# Patient Record
Sex: FEMALE | Race: WHITE | NOT HISPANIC OR LATINO | Employment: FULL TIME | URBAN - METROPOLITAN AREA
[De-identification: names, ages, dates, MRNs, and addresses within clinical notes are randomized per-mention and may not be internally consistent; named-entity substitution may affect disease eponyms.]

---

## 2017-01-20 ENCOUNTER — TRANSCRIBE ORDERS (OUTPATIENT)
Dept: ADMINISTRATIVE | Facility: HOSPITAL | Age: 57
End: 2017-01-20

## 2017-01-20 ENCOUNTER — APPOINTMENT (OUTPATIENT)
Dept: LAB | Facility: MEDICAL CENTER | Age: 57
End: 2017-01-20
Payer: COMMERCIAL

## 2017-01-20 DIAGNOSIS — R73.01 IMPAIRED FASTING GLUCOSE: ICD-10-CM

## 2017-01-20 DIAGNOSIS — D50.9 IRON DEFICIENCY ANEMIA, UNSPECIFIED: Primary | ICD-10-CM

## 2017-01-20 DIAGNOSIS — E78.5 HYPERLIPIDEMIA, UNSPECIFIED HYPERLIPIDEMIA TYPE: ICD-10-CM

## 2017-01-20 DIAGNOSIS — D50.9 IRON DEFICIENCY ANEMIA, UNSPECIFIED: ICD-10-CM

## 2017-01-20 DIAGNOSIS — I10 ESSENTIAL HYPERTENSION, MALIGNANT: ICD-10-CM

## 2017-01-20 LAB
ALBUMIN SERPL BCP-MCNC: 3.5 G/DL (ref 3.5–5)
ALP SERPL-CCNC: 121 U/L (ref 46–116)
ALT SERPL W P-5'-P-CCNC: 18 U/L (ref 12–78)
ANION GAP SERPL CALCULATED.3IONS-SCNC: 7 MMOL/L (ref 4–13)
AST SERPL W P-5'-P-CCNC: 13 U/L (ref 5–45)
BASOPHILS # BLD AUTO: 0.03 THOUSANDS/ΜL (ref 0–0.1)
BASOPHILS NFR BLD AUTO: 0 % (ref 0–1)
BILIRUB SERPL-MCNC: 0.39 MG/DL (ref 0.2–1)
BUN SERPL-MCNC: 10 MG/DL (ref 5–25)
CALCIUM SERPL-MCNC: 9 MG/DL (ref 8.3–10.1)
CHLORIDE SERPL-SCNC: 105 MMOL/L (ref 100–108)
CHOLEST SERPL-MCNC: 181 MG/DL (ref 50–200)
CO2 SERPL-SCNC: 28 MMOL/L (ref 21–32)
CREAT SERPL-MCNC: 0.85 MG/DL (ref 0.6–1.3)
EOSINOPHIL # BLD AUTO: 0.31 THOUSAND/ΜL (ref 0–0.61)
EOSINOPHIL NFR BLD AUTO: 4 % (ref 0–6)
ERYTHROCYTE [DISTWIDTH] IN BLOOD BY AUTOMATED COUNT: 17.2 % (ref 11.6–15.1)
EST. AVERAGE GLUCOSE BLD GHB EST-MCNC: 123 MG/DL
GFR SERPL CREATININE-BSD FRML MDRD: >60 ML/MIN/1.73SQ M
GLUCOSE SERPL-MCNC: 91 MG/DL (ref 65–140)
HBA1C MFR BLD: 5.9 % (ref 4.2–6.3)
HCT VFR BLD AUTO: 36.7 % (ref 34.8–46.1)
HDLC SERPL-MCNC: 47 MG/DL (ref 40–60)
HGB BLD-MCNC: 11.2 G/DL (ref 11.5–15.4)
IRON SERPL-MCNC: 33 UG/DL (ref 50–170)
LDLC SERPL CALC-MCNC: 109 MG/DL (ref 0–100)
LYMPHOCYTES # BLD AUTO: 2.05 THOUSANDS/ΜL (ref 0.6–4.47)
LYMPHOCYTES NFR BLD AUTO: 28 % (ref 14–44)
MCH RBC QN AUTO: 23.6 PG (ref 26.8–34.3)
MCHC RBC AUTO-ENTMCNC: 30.5 G/DL (ref 31.4–37.4)
MCV RBC AUTO: 77 FL (ref 82–98)
MONOCYTES # BLD AUTO: 0.51 THOUSAND/ΜL (ref 0.17–1.22)
MONOCYTES NFR BLD AUTO: 7 % (ref 4–12)
NEUTROPHILS # BLD AUTO: 4.46 THOUSANDS/ΜL (ref 1.85–7.62)
NEUTS SEG NFR BLD AUTO: 61 % (ref 43–75)
NRBC BLD AUTO-RTO: 0 /100 WBCS
PLATELET # BLD AUTO: 306 THOUSANDS/UL (ref 149–390)
PMV BLD AUTO: 11 FL (ref 8.9–12.7)
POTASSIUM SERPL-SCNC: 4.1 MMOL/L (ref 3.5–5.3)
PROT SERPL-MCNC: 7.8 G/DL (ref 6.4–8.2)
RBC # BLD AUTO: 4.74 MILLION/UL (ref 3.81–5.12)
SODIUM SERPL-SCNC: 140 MMOL/L (ref 136–145)
TRIGL SERPL-MCNC: 126 MG/DL
WBC # BLD AUTO: 7.37 THOUSAND/UL (ref 4.31–10.16)

## 2017-01-20 PROCEDURE — 83036 HEMOGLOBIN GLYCOSYLATED A1C: CPT

## 2017-01-20 PROCEDURE — 36415 COLL VENOUS BLD VENIPUNCTURE: CPT

## 2017-01-20 PROCEDURE — 83540 ASSAY OF IRON: CPT

## 2017-01-20 PROCEDURE — 80053 COMPREHEN METABOLIC PANEL: CPT

## 2017-01-20 PROCEDURE — 85025 COMPLETE CBC W/AUTO DIFF WBC: CPT

## 2017-01-20 PROCEDURE — 80061 LIPID PANEL: CPT

## 2019-11-08 ENCOUNTER — TRANSCRIBE ORDERS (OUTPATIENT)
Dept: ADMINISTRATIVE | Facility: HOSPITAL | Age: 59
End: 2019-11-08

## 2019-11-08 ENCOUNTER — APPOINTMENT (OUTPATIENT)
Dept: LAB | Facility: MEDICAL CENTER | Age: 59
End: 2019-11-08
Payer: COMMERCIAL

## 2019-11-08 DIAGNOSIS — Z98.84 HX OF GASTRIC BYPASS: ICD-10-CM

## 2019-11-08 DIAGNOSIS — R73.01 IMPAIRED FASTING GLUCOSE: Primary | ICD-10-CM

## 2019-11-08 DIAGNOSIS — D50.9 IRON DEFICIENCY ANEMIA, UNSPECIFIED IRON DEFICIENCY ANEMIA TYPE: ICD-10-CM

## 2019-11-08 DIAGNOSIS — I10 ESSENTIAL HYPERTENSION, BENIGN: ICD-10-CM

## 2019-11-08 DIAGNOSIS — R00.2 PALPITATIONS: ICD-10-CM

## 2019-11-08 LAB
25(OH)D3 SERPL-MCNC: 10.8 NG/ML (ref 30–100)
ALBUMIN SERPL BCP-MCNC: 3.7 G/DL (ref 3.5–5)
ALP SERPL-CCNC: 125 U/L (ref 46–116)
ALT SERPL W P-5'-P-CCNC: 18 U/L (ref 12–78)
ANION GAP SERPL CALCULATED.3IONS-SCNC: 5 MMOL/L (ref 4–13)
AST SERPL W P-5'-P-CCNC: 18 U/L (ref 5–45)
BASOPHILS # BLD AUTO: 0.05 THOUSANDS/ΜL (ref 0–0.1)
BASOPHILS NFR BLD AUTO: 1 % (ref 0–1)
BILIRUB SERPL-MCNC: 0.44 MG/DL (ref 0.2–1)
BUN SERPL-MCNC: 13 MG/DL (ref 5–25)
CALCIUM SERPL-MCNC: 9 MG/DL (ref 8.3–10.1)
CHLORIDE SERPL-SCNC: 107 MMOL/L (ref 100–108)
CHOLEST SERPL-MCNC: 191 MG/DL (ref 50–200)
CO2 SERPL-SCNC: 27 MMOL/L (ref 21–32)
CREAT SERPL-MCNC: 0.89 MG/DL (ref 0.6–1.3)
EOSINOPHIL # BLD AUTO: 0.24 THOUSAND/ΜL (ref 0–0.61)
EOSINOPHIL NFR BLD AUTO: 4 % (ref 0–6)
ERYTHROCYTE [DISTWIDTH] IN BLOOD BY AUTOMATED COUNT: 14.9 % (ref 11.6–15.1)
EST. AVERAGE GLUCOSE BLD GHB EST-MCNC: 111 MG/DL
GFR SERPL CREATININE-BSD FRML MDRD: 71 ML/MIN/1.73SQ M
GLUCOSE P FAST SERPL-MCNC: 95 MG/DL (ref 65–99)
HBA1C MFR BLD: 5.5 % (ref 4.2–6.3)
HCT VFR BLD AUTO: 39.1 % (ref 34.8–46.1)
HDLC SERPL-MCNC: 49 MG/DL
HGB BLD-MCNC: 11.8 G/DL (ref 11.5–15.4)
IMM GRANULOCYTES # BLD AUTO: 0.01 THOUSAND/UL (ref 0–0.2)
IMM GRANULOCYTES NFR BLD AUTO: 0 % (ref 0–2)
IRON SERPL-MCNC: 58 UG/DL (ref 50–170)
LDLC SERPL CALC-MCNC: 115 MG/DL (ref 0–100)
LYMPHOCYTES # BLD AUTO: 1.91 THOUSANDS/ΜL (ref 0.6–4.47)
LYMPHOCYTES NFR BLD AUTO: 31 % (ref 14–44)
MCH RBC QN AUTO: 24.9 PG (ref 26.8–34.3)
MCHC RBC AUTO-ENTMCNC: 30.2 G/DL (ref 31.4–37.4)
MCV RBC AUTO: 83 FL (ref 82–98)
MONOCYTES # BLD AUTO: 0.39 THOUSAND/ΜL (ref 0.17–1.22)
MONOCYTES NFR BLD AUTO: 6 % (ref 4–12)
NEUTROPHILS # BLD AUTO: 3.66 THOUSANDS/ΜL (ref 1.85–7.62)
NEUTS SEG NFR BLD AUTO: 58 % (ref 43–75)
NONHDLC SERPL-MCNC: 142 MG/DL
NRBC BLD AUTO-RTO: 0 /100 WBCS
PLATELET # BLD AUTO: 261 THOUSANDS/UL (ref 149–390)
PMV BLD AUTO: 10.8 FL (ref 8.9–12.7)
POTASSIUM SERPL-SCNC: 3.8 MMOL/L (ref 3.5–5.3)
PROT SERPL-MCNC: 7.8 G/DL (ref 6.4–8.2)
RBC # BLD AUTO: 4.74 MILLION/UL (ref 3.81–5.12)
SODIUM SERPL-SCNC: 139 MMOL/L (ref 136–145)
TRIGL SERPL-MCNC: 134 MG/DL
TSH SERPL DL<=0.05 MIU/L-ACNC: 3.23 UIU/ML (ref 0.36–3.74)
WBC # BLD AUTO: 6.26 THOUSAND/UL (ref 4.31–10.16)

## 2019-11-08 PROCEDURE — 80053 COMPREHEN METABOLIC PANEL: CPT | Performed by: FAMILY MEDICINE

## 2019-11-08 PROCEDURE — 85025 COMPLETE CBC W/AUTO DIFF WBC: CPT | Performed by: FAMILY MEDICINE

## 2019-11-08 PROCEDURE — 84443 ASSAY THYROID STIM HORMONE: CPT | Performed by: FAMILY MEDICINE

## 2019-11-08 PROCEDURE — 82306 VITAMIN D 25 HYDROXY: CPT | Performed by: FAMILY MEDICINE

## 2019-11-08 PROCEDURE — 83540 ASSAY OF IRON: CPT | Performed by: FAMILY MEDICINE

## 2019-11-08 PROCEDURE — 83036 HEMOGLOBIN GLYCOSYLATED A1C: CPT | Performed by: FAMILY MEDICINE

## 2019-11-08 PROCEDURE — 36415 COLL VENOUS BLD VENIPUNCTURE: CPT | Performed by: FAMILY MEDICINE

## 2019-11-08 PROCEDURE — 80061 LIPID PANEL: CPT | Performed by: FAMILY MEDICINE

## 2020-08-10 ENCOUNTER — TELEPHONE (OUTPATIENT)
Dept: OBGYN CLINIC | Facility: HOSPITAL | Age: 60
End: 2020-08-10

## 2020-08-10 NOTE — TELEPHONE ENCOUNTER
Patient requested an appointment online for her left hip  I called the patient and left a voice mail to call us back if she still needs an appointment  I also asked what insurance she has and if it needs a referral  Please schedule when she calls back      Thank you

## 2020-09-10 ENCOUNTER — OFFICE VISIT (OUTPATIENT)
Dept: FAMILY MEDICINE CLINIC | Facility: CLINIC | Age: 60
End: 2020-09-10
Payer: COMMERCIAL

## 2020-09-10 VITALS
OXYGEN SATURATION: 98 % | RESPIRATION RATE: 16 BRPM | HEIGHT: 64 IN | WEIGHT: 293 LBS | SYSTOLIC BLOOD PRESSURE: 120 MMHG | TEMPERATURE: 97.1 F | BODY MASS INDEX: 50.02 KG/M2 | DIASTOLIC BLOOD PRESSURE: 70 MMHG | HEART RATE: 72 BPM

## 2020-09-10 DIAGNOSIS — F41.1 GENERALIZED ANXIETY DISORDER: ICD-10-CM

## 2020-09-10 DIAGNOSIS — I10 ESSENTIAL HYPERTENSION: Primary | ICD-10-CM

## 2020-09-10 DIAGNOSIS — S39.012A STRAIN OF LUMBAR REGION, INITIAL ENCOUNTER: ICD-10-CM

## 2020-09-10 DIAGNOSIS — N63.11 BREAST LUMP ON RIGHT SIDE AT 10 O'CLOCK POSITION: ICD-10-CM

## 2020-09-10 DIAGNOSIS — Z12.39 BREAST CANCER SCREENING: ICD-10-CM

## 2020-09-10 DIAGNOSIS — Z12.11 COLON CANCER SCREENING: ICD-10-CM

## 2020-09-10 DIAGNOSIS — Z23 IMMUNIZATION DUE: ICD-10-CM

## 2020-09-10 DIAGNOSIS — M79.7 FIBROMYALGIA, PRIMARY: ICD-10-CM

## 2020-09-10 DIAGNOSIS — E55.9 VITAMIN D DEFICIENCY: ICD-10-CM

## 2020-09-10 PROCEDURE — 99204 OFFICE O/P NEW MOD 45 MIN: CPT | Performed by: FAMILY MEDICINE

## 2020-09-10 PROCEDURE — 90471 IMMUNIZATION ADMIN: CPT | Performed by: FAMILY MEDICINE

## 2020-09-10 PROCEDURE — 90715 TDAP VACCINE 7 YRS/> IM: CPT | Performed by: FAMILY MEDICINE

## 2020-09-10 RX ORDER — LORAZEPAM 0.5 MG/1
TABLET ORAL
COMMUNITY
Start: 2020-08-26 | End: 2021-04-07 | Stop reason: SDUPTHER

## 2020-09-10 RX ORDER — BISOPROLOL FUMARATE AND HYDROCHLOROTHIAZIDE 10; 6.25 MG/1; MG/1
TABLET ORAL
COMMUNITY
Start: 2020-09-07 | End: 2020-09-10 | Stop reason: SDUPTHER

## 2020-09-10 RX ORDER — DULOXETIN HYDROCHLORIDE 60 MG/1
60 CAPSULE, DELAYED RELEASE ORAL DAILY
Qty: 90 CAPSULE | Refills: 2 | Status: SHIPPED | OUTPATIENT
Start: 2020-09-10 | End: 2021-06-28 | Stop reason: SDUPTHER

## 2020-09-10 RX ORDER — DULOXETIN HYDROCHLORIDE 60 MG/1
60 CAPSULE, DELAYED RELEASE ORAL DAILY
COMMUNITY
Start: 2020-08-26 | End: 2020-09-10 | Stop reason: SDUPTHER

## 2020-09-10 RX ORDER — BISOPROLOL FUMARATE AND HYDROCHLOROTHIAZIDE 10; 6.25 MG/1; MG/1
1 TABLET ORAL DAILY
Qty: 90 TABLET | Refills: 2 | Status: SHIPPED | OUTPATIENT
Start: 2020-09-10 | End: 2021-07-18

## 2020-09-10 RX ORDER — GABAPENTIN 300 MG/1
300 CAPSULE ORAL 2 TIMES DAILY
COMMUNITY
End: 2020-10-16 | Stop reason: SDUPTHER

## 2020-09-10 NOTE — PROGRESS NOTES
Assessment/Plan:         Problem List Items Addressed This Visit        Cardiovascular and Mediastinum    Essential hypertension - Primary     wellcontrolled         Relevant Medications    bisoprolol-hydrochlorothiazide (ZIAC) 10-6 25 MG per tablet    Other Relevant Orders    Comprehensive metabolic panel       Musculoskeletal and Integument    Fibromyalgia, primary     On meds           Relevant Medications    DULoxetine (CYMBALTA) 60 mg delayed release capsule       Other    Breast lump on right side at 10 o'clock position     Lump 1 5x1cm smooth right upper will get diagnostic mammo and right breast US         Relevant Orders    Mammo diagnostic bilateral w cad    US breast right complete (abus)    Generalized anxiety disorder     Ok on meds           Relevant Medications    LORazepam (ATIVAN) 0 5 mg tablet    DULoxetine (CYMBALTA) 60 mg delayed release capsule      Other Visit Diagnoses     Vitamin D deficiency        Relevant Orders    Vitamin D 25 hydroxy    Breast cancer screening        Colon cancer screening        Relevant Orders    Cologuard    Immunization due        Relevant Orders    TDAP VACCINE GREATER THAN OR EQUAL TO 6YO IM    Strain of lumbar region, initial encounter        Relevant Orders    Ambulatory referral to Orthopedic Surgery            Subjective:  New  Patient here to establish and discuss medical problems HTN  Fibromyalgia  Generalized anxiety with hx of panic attacks  Vit d deficiency     Patient ID: Bartolo Sullivan is a 61 y o  female  HPI    The following portions of the patient's history were reviewed and updated as appropriate:   She has a past medical history of Anxiety, Fibromyalgia, High blood pressure, Hypertension, and Panic attacks  ,  does not have any pertinent problems on file  ,   has a past surgical history that includes Tubal ligation; Shoulder open rotator cuff repair; Cholecystectomy; and Knee surgery  ,  family history includes Heart attack in her father; Hypertension in her brother and mother  ,   reports that she quit smoking about 3 years ago  She started smoking about 40 years ago  She smoked 0 50 packs per day  She has never used smokeless tobacco  She reports current alcohol use  She reports that she does not use drugs  ,  has No Known Allergies     Current Outpatient Medications   Medication Sig Dispense Refill    bisoprolol-hydrochlorothiazide (ZIAC) 10-6 25 MG per tablet Take 1 tablet by mouth daily 90 tablet 2    DULoxetine (CYMBALTA) 60 mg delayed release capsule Take 1 capsule (60 mg total) by mouth daily 90 capsule 2    gabapentin (NEURONTIN) 300 mg capsule Take 300 mg by mouth 2 (two) times a day      LORazepam (ATIVAN) 0 5 mg tablet 1 TABLET BY MOUTH TWICE A DAY AS NEEDED FOR ANXIETY       No current facility-administered medications for this visit  Review of Systems   Constitutional: Negative for appetite change, chills, fatigue and fever  Respiratory: Negative for cough, chest tightness and shortness of breath  Cardiovascular: Negative for chest pain, palpitations and leg swelling  Gastrointestinal: Negative for abdominal pain, constipation, diarrhea, nausea and vomiting  Genitourinary: Negative for difficulty urinating and frequency  Musculoskeletal: Positive for back pain (chronic for 6 monthsc)  Negative for arthralgias and neck pain  Skin: Negative for rash  Neurological: Negative for dizziness, weakness, light-headedness, numbness and headaches  Hematological: Does not bruise/bleed easily  Psychiatric/Behavioral: Negative for dysphoric mood and sleep disturbance  The patient is not nervous/anxious  Objective:  Vitals:    09/10/20 1052   BP: 120/70   BP Location: Left arm   Patient Position: Sitting   Cuff Size: Large   Pulse: 72   Resp: 16   Temp: (!) 97 1 °F (36 2 °C)   SpO2: 98%   Weight: (!) 144 kg (317 lb)   Height: 5' 4" (1 626 m)     Body mass index is 54 41 kg/m²  BMI Counseling:  Body mass index is 54 41 kg/m²  The BMI is above normal  Nutrition recommendations include 3-5 servings of fruits/vegetables daily, reducing fast food intake, consuming healthier snacks, decreasing soda and/or juice intake and moderation in carbohydrate intake  Exercise recommendations include exercising 3-5 times per week and strength training exercises  Physical Exam  Vitals signs reviewed  Constitutional:       General: She is not in acute distress  Appearance: Normal appearance  She is well-developed  She is obese  Eyes:      Extraocular Movements: Extraocular movements intact  Conjunctiva/sclera: Conjunctivae normal       Pupils: Pupils are equal, round, and reactive to light  Neck:      Musculoskeletal: Normal range of motion and neck supple  Thyroid: No thyromegaly  Vascular: No carotid bruit  Cardiovascular:      Rate and Rhythm: Normal rate and regular rhythm  Heart sounds: Normal heart sounds  No murmur  Pulmonary:      Effort: Pulmonary effort is normal  No respiratory distress  Breath sounds: Normal breath sounds  Chest:      Chest wall: No tenderness  Breasts:         Right: Mass (right upper 10 oclock position) present  No swelling, bleeding, inverted nipple, nipple discharge, skin change or tenderness  Left: Normal  No swelling, bleeding, inverted nipple, mass, nipple discharge or tenderness  Abdominal:      General: Bowel sounds are normal  There is no distension  Palpations: Abdomen is soft  Tenderness: There is no abdominal tenderness  Lymphadenopathy:      Cervical: No cervical adenopathy  Upper Body:      Right upper body: No axillary adenopathy  Skin:     General: Skin is warm and dry  Neurological:      General: No focal deficit present  Mental Status: She is alert and oriented to person, place, and time  Mental status is at baseline  Cranial Nerves: No cranial nerve deficit        Deep Tendon Reflexes: Reflexes normal  Psychiatric:         Mood and Affect: Mood normal          Behavior: Behavior normal          Thought Content:  Thought content normal

## 2020-09-15 ENCOUNTER — HOSPITAL ENCOUNTER (OUTPATIENT)
Dept: ULTRASOUND IMAGING | Facility: CLINIC | Age: 60
Discharge: HOME | End: 2020-09-15
Payer: COMMERCIAL

## 2020-09-15 ENCOUNTER — HOSPITAL ENCOUNTER (OUTPATIENT)
Dept: MAMMOGRAPHY | Facility: CLINIC | Age: 60
Discharge: HOME | End: 2020-09-15
Payer: COMMERCIAL

## 2020-09-15 VITALS — WEIGHT: 293 LBS | HEIGHT: 64 IN | BODY MASS INDEX: 50.02 KG/M2

## 2020-09-15 DIAGNOSIS — N63.11 BREAST LUMP ON RIGHT SIDE AT 10 O'CLOCK POSITION: ICD-10-CM

## 2020-09-15 PROCEDURE — G0279 TOMOSYNTHESIS, MAMMO: HCPCS

## 2020-09-15 PROCEDURE — 76642 ULTRASOUND BREAST LIMITED: CPT

## 2020-09-15 PROCEDURE — 77066 DX MAMMO INCL CAD BI: CPT

## 2020-10-16 ENCOUNTER — CONSULT (OUTPATIENT)
Dept: OBGYN CLINIC | Facility: CLINIC | Age: 60
End: 2020-10-16
Payer: COMMERCIAL

## 2020-10-16 VITALS
HEART RATE: 69 BPM | WEIGHT: 293 LBS | BODY MASS INDEX: 50.02 KG/M2 | TEMPERATURE: 97.5 F | HEIGHT: 64 IN | DIASTOLIC BLOOD PRESSURE: 76 MMHG | SYSTOLIC BLOOD PRESSURE: 171 MMHG

## 2020-10-16 DIAGNOSIS — R52 PAIN: Primary | ICD-10-CM

## 2020-10-16 DIAGNOSIS — S39.012A STRAIN OF LUMBAR REGION, INITIAL ENCOUNTER: ICD-10-CM

## 2020-10-16 DIAGNOSIS — G89.29 CHRONIC LEFT-SIDED LOW BACK PAIN WITH LEFT-SIDED SCIATICA: Primary | ICD-10-CM

## 2020-10-16 DIAGNOSIS — M54.42 CHRONIC LEFT-SIDED LOW BACK PAIN WITH LEFT-SIDED SCIATICA: Primary | ICD-10-CM

## 2020-10-16 PROBLEM — M54.50 CHRONIC BILATERAL LOW BACK PAIN WITHOUT SCIATICA: Status: ACTIVE | Noted: 2020-10-16

## 2020-10-16 PROCEDURE — 99244 OFF/OP CNSLTJ NEW/EST MOD 40: CPT | Performed by: PHYSICAL MEDICINE & REHABILITATION

## 2020-10-16 RX ORDER — LIDOCAINE 50 MG/G
1 PATCH TOPICAL DAILY
Qty: 30 PATCH | Refills: 1 | Status: SHIPPED | OUTPATIENT
Start: 2020-10-16 | End: 2021-01-06

## 2020-10-16 RX ORDER — GABAPENTIN 300 MG/1
300 CAPSULE ORAL 2 TIMES DAILY
Qty: 60 CAPSULE | Refills: 3 | Status: SHIPPED | OUTPATIENT
Start: 2020-10-16 | End: 2020-12-29

## 2020-10-16 RX ORDER — NAPROXEN 500 MG/1
500 TABLET ORAL 2 TIMES DAILY PRN
Qty: 60 TABLET | Refills: 1 | Status: SHIPPED | OUTPATIENT
Start: 2020-10-16 | End: 2020-11-13

## 2020-10-27 ENCOUNTER — EVALUATION (OUTPATIENT)
Dept: PHYSICAL THERAPY | Facility: MEDICAL CENTER | Age: 60
End: 2020-10-27
Payer: COMMERCIAL

## 2020-10-27 DIAGNOSIS — S39.012A STRAIN OF LUMBAR REGION, INITIAL ENCOUNTER: ICD-10-CM

## 2020-10-27 DIAGNOSIS — M54.42 CHRONIC LEFT-SIDED LOW BACK PAIN WITH LEFT-SIDED SCIATICA: ICD-10-CM

## 2020-10-27 DIAGNOSIS — G89.29 CHRONIC LEFT-SIDED LOW BACK PAIN WITH LEFT-SIDED SCIATICA: ICD-10-CM

## 2020-10-27 PROCEDURE — 97110 THERAPEUTIC EXERCISES: CPT | Performed by: PHYSICAL THERAPIST

## 2020-10-27 PROCEDURE — 97162 PT EVAL MOD COMPLEX 30 MIN: CPT | Performed by: PHYSICAL THERAPIST

## 2020-11-03 ENCOUNTER — APPOINTMENT (OUTPATIENT)
Dept: PHYSICAL THERAPY | Facility: MEDICAL CENTER | Age: 60
End: 2020-11-03
Payer: COMMERCIAL

## 2020-11-04 ENCOUNTER — OFFICE VISIT (OUTPATIENT)
Dept: PHYSICAL THERAPY | Facility: MEDICAL CENTER | Age: 60
End: 2020-11-04
Payer: COMMERCIAL

## 2020-11-04 DIAGNOSIS — M54.42 CHRONIC LEFT-SIDED LOW BACK PAIN WITH LEFT-SIDED SCIATICA: ICD-10-CM

## 2020-11-04 DIAGNOSIS — S39.012A STRAIN OF LUMBAR REGION, INITIAL ENCOUNTER: Primary | ICD-10-CM

## 2020-11-04 DIAGNOSIS — G89.29 CHRONIC LEFT-SIDED LOW BACK PAIN WITH LEFT-SIDED SCIATICA: ICD-10-CM

## 2020-11-04 PROCEDURE — 97140 MANUAL THERAPY 1/> REGIONS: CPT | Performed by: PHYSICAL THERAPIST

## 2020-11-04 PROCEDURE — 97112 NEUROMUSCULAR REEDUCATION: CPT | Performed by: PHYSICAL THERAPIST

## 2020-11-04 PROCEDURE — 97110 THERAPEUTIC EXERCISES: CPT | Performed by: PHYSICAL THERAPIST

## 2020-11-05 ENCOUNTER — APPOINTMENT (OUTPATIENT)
Dept: PHYSICAL THERAPY | Facility: MEDICAL CENTER | Age: 60
End: 2020-11-05
Payer: COMMERCIAL

## 2020-11-06 ENCOUNTER — APPOINTMENT (OUTPATIENT)
Dept: PHYSICAL THERAPY | Facility: MEDICAL CENTER | Age: 60
End: 2020-11-06
Payer: COMMERCIAL

## 2020-11-11 ENCOUNTER — OFFICE VISIT (OUTPATIENT)
Dept: PHYSICAL THERAPY | Facility: MEDICAL CENTER | Age: 60
End: 2020-11-11
Payer: COMMERCIAL

## 2020-11-11 DIAGNOSIS — G89.29 CHRONIC LEFT-SIDED LOW BACK PAIN WITH LEFT-SIDED SCIATICA: ICD-10-CM

## 2020-11-11 DIAGNOSIS — M54.42 CHRONIC LEFT-SIDED LOW BACK PAIN WITH LEFT-SIDED SCIATICA: ICD-10-CM

## 2020-11-11 DIAGNOSIS — S39.012A STRAIN OF LUMBAR REGION, INITIAL ENCOUNTER: Primary | ICD-10-CM

## 2020-11-11 PROCEDURE — 97110 THERAPEUTIC EXERCISES: CPT | Performed by: PHYSICAL THERAPIST

## 2020-11-11 PROCEDURE — 97112 NEUROMUSCULAR REEDUCATION: CPT | Performed by: PHYSICAL THERAPIST

## 2020-11-13 ENCOUNTER — TELEPHONE (OUTPATIENT)
Dept: OBGYN CLINIC | Facility: HOSPITAL | Age: 60
End: 2020-11-13

## 2020-11-13 ENCOUNTER — OFFICE VISIT (OUTPATIENT)
Dept: PHYSICAL THERAPY | Facility: MEDICAL CENTER | Age: 60
End: 2020-11-13
Payer: COMMERCIAL

## 2020-11-13 DIAGNOSIS — M54.42 CHRONIC LEFT-SIDED LOW BACK PAIN WITH LEFT-SIDED SCIATICA: ICD-10-CM

## 2020-11-13 DIAGNOSIS — M54.42 CHRONIC LEFT-SIDED LOW BACK PAIN WITH LEFT-SIDED SCIATICA: Primary | ICD-10-CM

## 2020-11-13 DIAGNOSIS — G89.29 CHRONIC LEFT-SIDED LOW BACK PAIN WITH LEFT-SIDED SCIATICA: Primary | ICD-10-CM

## 2020-11-13 DIAGNOSIS — G89.29 CHRONIC LEFT-SIDED LOW BACK PAIN WITH LEFT-SIDED SCIATICA: ICD-10-CM

## 2020-11-13 DIAGNOSIS — S39.012A STRAIN OF LUMBAR REGION, INITIAL ENCOUNTER: Primary | ICD-10-CM

## 2020-11-13 PROCEDURE — 97110 THERAPEUTIC EXERCISES: CPT | Performed by: PHYSICAL THERAPIST

## 2020-11-13 PROCEDURE — 97112 NEUROMUSCULAR REEDUCATION: CPT | Performed by: PHYSICAL THERAPIST

## 2020-11-13 RX ORDER — MELOXICAM 15 MG/1
15 TABLET ORAL DAILY PRN
Qty: 60 TABLET | Refills: 0 | Status: SHIPPED | OUTPATIENT
Start: 2020-11-13 | End: 2020-12-04

## 2020-11-17 ENCOUNTER — OFFICE VISIT (OUTPATIENT)
Dept: PHYSICAL THERAPY | Facility: MEDICAL CENTER | Age: 60
End: 2020-11-17
Payer: COMMERCIAL

## 2020-11-17 ENCOUNTER — OFFICE VISIT (OUTPATIENT)
Dept: URGENT CARE | Facility: MEDICAL CENTER | Age: 60
End: 2020-11-17
Payer: COMMERCIAL

## 2020-11-17 VITALS
SYSTOLIC BLOOD PRESSURE: 176 MMHG | DIASTOLIC BLOOD PRESSURE: 80 MMHG | HEART RATE: 75 BPM | HEIGHT: 64 IN | BODY MASS INDEX: 50.02 KG/M2 | WEIGHT: 293 LBS | OXYGEN SATURATION: 96 % | TEMPERATURE: 98.2 F

## 2020-11-17 DIAGNOSIS — S39.012A STRAIN OF LUMBAR REGION, INITIAL ENCOUNTER: Primary | ICD-10-CM

## 2020-11-17 DIAGNOSIS — M54.42 CHRONIC LEFT-SIDED LOW BACK PAIN WITH LEFT-SIDED SCIATICA: ICD-10-CM

## 2020-11-17 DIAGNOSIS — H10.31 ACUTE BACTERIAL CONJUNCTIVITIS OF RIGHT EYE: Primary | ICD-10-CM

## 2020-11-17 DIAGNOSIS — G89.29 CHRONIC LEFT-SIDED LOW BACK PAIN WITH LEFT-SIDED SCIATICA: ICD-10-CM

## 2020-11-17 PROCEDURE — G0382 LEV 3 HOSP TYPE B ED VISIT: HCPCS | Performed by: PHYSICIAN ASSISTANT

## 2020-11-17 RX ORDER — OFLOXACIN 3 MG/ML
1 SOLUTION/ DROPS OPHTHALMIC 4 TIMES DAILY
Qty: 5 ML | Refills: 0 | Status: SHIPPED | OUTPATIENT
Start: 2020-11-17 | End: 2020-11-22

## 2020-11-18 ENCOUNTER — APPOINTMENT (OUTPATIENT)
Dept: PHYSICAL THERAPY | Facility: MEDICAL CENTER | Age: 60
End: 2020-11-18
Payer: COMMERCIAL

## 2020-11-19 ENCOUNTER — OFFICE VISIT (OUTPATIENT)
Dept: PHYSICAL THERAPY | Facility: MEDICAL CENTER | Age: 60
End: 2020-11-19
Payer: COMMERCIAL

## 2020-11-19 DIAGNOSIS — G89.29 CHRONIC LEFT-SIDED LOW BACK PAIN WITH LEFT-SIDED SCIATICA: ICD-10-CM

## 2020-11-19 DIAGNOSIS — S39.012A STRAIN OF LUMBAR REGION, INITIAL ENCOUNTER: Primary | ICD-10-CM

## 2020-11-19 DIAGNOSIS — M54.42 CHRONIC LEFT-SIDED LOW BACK PAIN WITH LEFT-SIDED SCIATICA: ICD-10-CM

## 2020-11-19 PROCEDURE — 97112 NEUROMUSCULAR REEDUCATION: CPT | Performed by: PHYSICAL THERAPIST

## 2020-11-19 PROCEDURE — 97110 THERAPEUTIC EXERCISES: CPT | Performed by: PHYSICAL THERAPIST

## 2020-11-20 ENCOUNTER — APPOINTMENT (OUTPATIENT)
Dept: PHYSICAL THERAPY | Facility: MEDICAL CENTER | Age: 60
End: 2020-11-20
Payer: COMMERCIAL

## 2020-11-23 ENCOUNTER — OFFICE VISIT (OUTPATIENT)
Dept: PHYSICAL THERAPY | Facility: MEDICAL CENTER | Age: 60
End: 2020-11-23
Payer: COMMERCIAL

## 2020-11-23 DIAGNOSIS — G89.29 CHRONIC LEFT-SIDED LOW BACK PAIN WITH LEFT-SIDED SCIATICA: ICD-10-CM

## 2020-11-23 DIAGNOSIS — S39.012A STRAIN OF LUMBAR REGION, INITIAL ENCOUNTER: Primary | ICD-10-CM

## 2020-11-23 DIAGNOSIS — M54.42 CHRONIC LEFT-SIDED LOW BACK PAIN WITH LEFT-SIDED SCIATICA: ICD-10-CM

## 2020-11-23 PROCEDURE — 97112 NEUROMUSCULAR REEDUCATION: CPT | Performed by: PHYSICAL THERAPIST

## 2020-11-23 PROCEDURE — 97110 THERAPEUTIC EXERCISES: CPT | Performed by: PHYSICAL THERAPIST

## 2020-11-25 ENCOUNTER — APPOINTMENT (OUTPATIENT)
Dept: PHYSICAL THERAPY | Facility: MEDICAL CENTER | Age: 60
End: 2020-11-25
Payer: COMMERCIAL

## 2020-11-30 ENCOUNTER — APPOINTMENT (OUTPATIENT)
Dept: PHYSICAL THERAPY | Facility: MEDICAL CENTER | Age: 60
End: 2020-11-30
Payer: COMMERCIAL

## 2020-12-04 ENCOUNTER — OFFICE VISIT (OUTPATIENT)
Dept: OBGYN CLINIC | Facility: CLINIC | Age: 60
End: 2020-12-04
Payer: COMMERCIAL

## 2020-12-04 VITALS
HEIGHT: 64 IN | BODY MASS INDEX: 50.02 KG/M2 | SYSTOLIC BLOOD PRESSURE: 175 MMHG | WEIGHT: 293 LBS | DIASTOLIC BLOOD PRESSURE: 98 MMHG | HEART RATE: 69 BPM

## 2020-12-04 DIAGNOSIS — S39.012D STRAIN OF LUMBAR REGION, SUBSEQUENT ENCOUNTER: ICD-10-CM

## 2020-12-04 DIAGNOSIS — M47.816 FACET HYPERTROPHY OF LUMBAR REGION: Primary | ICD-10-CM

## 2020-12-04 DIAGNOSIS — G89.29 CHRONIC BILATERAL LOW BACK PAIN WITHOUT SCIATICA: ICD-10-CM

## 2020-12-04 DIAGNOSIS — M54.50 CHRONIC BILATERAL LOW BACK PAIN WITHOUT SCIATICA: ICD-10-CM

## 2020-12-04 PROCEDURE — 99214 OFFICE O/P EST MOD 30 MIN: CPT | Performed by: PHYSICAL MEDICINE & REHABILITATION

## 2020-12-04 RX ORDER — CYCLOBENZAPRINE HCL 5 MG
5 TABLET ORAL
Qty: 60 TABLET | Refills: 0 | Status: SHIPPED | OUTPATIENT
Start: 2020-12-04 | End: 2021-01-06

## 2020-12-17 ENCOUNTER — HOSPITAL ENCOUNTER (OUTPATIENT)
Dept: MRI IMAGING | Facility: HOSPITAL | Age: 60
Discharge: HOME | End: 2020-12-17
Attending: PHYSICAL MEDICINE & REHABILITATION
Payer: COMMERCIAL

## 2020-12-17 DIAGNOSIS — M54.50 CHRONIC BILATERAL LOW BACK PAIN WITHOUT SCIATICA: ICD-10-CM

## 2020-12-17 DIAGNOSIS — G89.29 CHRONIC BILATERAL LOW BACK PAIN WITHOUT SCIATICA: ICD-10-CM

## 2020-12-17 DIAGNOSIS — S39.012D STRAIN OF LUMBAR REGION, SUBSEQUENT ENCOUNTER: ICD-10-CM

## 2020-12-17 PROCEDURE — 72148 MRI LUMBAR SPINE W/O DYE: CPT

## 2020-12-17 PROCEDURE — G1004 CDSM NDSC: HCPCS

## 2020-12-19 ENCOUNTER — IMMUNIZATIONS (OUTPATIENT)
Dept: FAMILY MEDICINE CLINIC | Facility: HOSPITAL | Age: 60
End: 2020-12-19
Payer: COMMERCIAL

## 2020-12-19 DIAGNOSIS — Z23 ENCOUNTER FOR IMMUNIZATION: ICD-10-CM

## 2020-12-19 PROCEDURE — 91300 SARS-COV-2 / COVID-19 MRNA VACCINE (PFIZER-BIONTECH) 30 MCG: CPT

## 2020-12-19 PROCEDURE — 0001A SARS-COV-2 / COVID-19 MRNA VACCINE (PFIZER-BIONTECH) 30 MCG: CPT

## 2020-12-29 DIAGNOSIS — R52 PAIN: ICD-10-CM

## 2020-12-29 RX ORDER — GABAPENTIN 300 MG/1
CAPSULE ORAL
Qty: 180 CAPSULE | Refills: 0 | Status: SHIPPED | OUTPATIENT
Start: 2020-12-29 | End: 2021-04-13

## 2021-01-06 ENCOUNTER — CONSULT (OUTPATIENT)
Dept: PAIN MEDICINE | Facility: CLINIC | Age: 61
End: 2021-01-06
Payer: COMMERCIAL

## 2021-01-06 ENCOUNTER — TRANSCRIBE ORDERS (OUTPATIENT)
Dept: PAIN MEDICINE | Facility: CLINIC | Age: 61
End: 2021-01-06

## 2021-01-06 VITALS
WEIGHT: 293 LBS | SYSTOLIC BLOOD PRESSURE: 136 MMHG | BODY MASS INDEX: 54.76 KG/M2 | DIASTOLIC BLOOD PRESSURE: 78 MMHG | HEART RATE: 72 BPM

## 2021-01-06 DIAGNOSIS — M48.061 SPINAL STENOSIS OF LUMBAR REGION WITHOUT NEUROGENIC CLAUDICATION: Primary | ICD-10-CM

## 2021-01-06 DIAGNOSIS — M51.26 LUMBAR DISC HERNIATION: ICD-10-CM

## 2021-01-06 DIAGNOSIS — M47.816 FACET HYPERTROPHY OF LUMBAR REGION: ICD-10-CM

## 2021-01-06 PROCEDURE — 99244 OFF/OP CNSLTJ NEW/EST MOD 40: CPT | Performed by: PHYSICAL MEDICINE & REHABILITATION

## 2021-01-06 NOTE — PROGRESS NOTES
Assessment  1  Spinal stenosis of lumbar region without neurogenic claudication    2  Facet hypertrophy of lumbar region    3  Lumbar disc herniation        Plan  Ms Fiordaliza Arevalo is a pleasant 58-year-old female who presents for initial evaluation regarding low back pain with radicular symptoms into the bilateral lower extremities left worse than right-sided  During today's evaluation she is demonstrating clinical and diagnostic evidence of low back pain that is likely multifactorial nature in the main pain generators appear to be from the lumbar spine and sacroiliac joints  I would agree with Dr Viraj Hanks she is demonstrating sacroiliac joint dysfunction as well as facet mediated lumbar pain and lumbar radiculopathy  Her MRI demonstrates a significant disc herniation with moderate to severe spinal stenosis without significant neurologic deficits  At this time interventional approaches will be beneficial and warranted  As such we will  1  Plan for lumbar epidural steroid injection left paramedian approach L4-L5  2  Advised to continue with current membrane stabilizing agents Cymbalta and gabapentin  3  Complete risks and benefits including bleeding, infection, tissue reaction, nerve injury and allergic reaction were discussed  The approach was demonstrated using models and literature was provided  Verbal and written consent was obtained  My impressions and treatment recommendations were discussed in detail with the patient who verbalized understanding and had no further questions  Discharge instructions were provided  I personally saw and examined the patient and I agree with the above discussed plan of care  Orders Placed This Encounter   Procedures    FL spine and pain procedure     Standing Status:   Future     Standing Expiration Date:   1/6/2025     Order Specific Question:   Reason for Exam:     Answer:   LESI     Order Specific Question:   Is the patient pregnant?      Answer:   No     Order Specific Question:   Anticoagulant hold needed? Answer:   No     No orders of the defined types were placed in this encounter  History of Present Illness    Amina Chao is a 61 y o  female presents to [place] [address] [facility] for initial evaluation and referral from Dr Pham Messina regarding low back pain with radicular symptoms into the bilateral lower extremities and hips  Patient denies any significant inciting event or recent trauma  Today she reports moderate to severe pain rated 7/10 and interfering with daily activities  Pain is constant 100% of the time that is worse in the morning in the evening  Describes symptoms of shooting, numbness, sharp, throbbing  Also complains of lower extremity weakness but denies falls  Does not use any durable medical equipment for ambulation  Pain is worse with standing, bending, walking, exercise  She has had no significant relief with physical therapy, exercise, chiropractic treatments and reports moderate relief with heat and ice  Currently taking over-the-counter NSAIDs including Motrin as well as muscle relaxers Flexeril and Ativan with minimal relief as well as Cymbalta which is provided minimal relief  Presents today for initial evaluation  I have personally reviewed and/or updated the patient's past medical history, past surgical history, family history, social history, current medications, allergies, and vital signs today  Review of Systems   Constitutional: Positive for unexpected weight change  Negative for fever  HENT: Negative for trouble swallowing  Eyes: Negative for visual disturbance  Respiratory: Negative for shortness of breath and wheezing  Cardiovascular: Negative for chest pain and palpitations  Gastrointestinal: Negative for constipation, diarrhea, nausea and vomiting  Endocrine: Negative for cold intolerance, heat intolerance and polydipsia  Genitourinary: Negative for difficulty urinating and frequency  Musculoskeletal: Positive for joint swelling  Negative for arthralgias, gait problem and myalgias  Skin: Negative for rash  Neurological: Positive for numbness and headaches  Negative for dizziness, seizures, syncope and weakness  Hematological: Does not bruise/bleed easily  Psychiatric/Behavioral: Negative for dysphoric mood  All other systems reviewed and are negative        Patient Active Problem List   Diagnosis    Breast lump on right side at 10 o'clock position    Essential hypertension    Generalized anxiety disorder    Fibromyalgia, primary    Strain of lumbar region    Chronic bilateral low back pain without sciatica    Facet hypertrophy of lumbar region       Past Medical History:   Diagnosis Date    Anxiety     Fibromyalgia     High blood pressure     Hypertension     Panic attacks        Past Surgical History:   Procedure Laterality Date    CHOLECYSTECTOMY      KNEE SURGERY      SHOULDER OPEN ROTATOR CUFF REPAIR      TUBAL LIGATION         Family History   Problem Relation Age of Onset    Hypertension Mother     Heart attack Father         late 46s     Hypertension Brother     No Known Problems Sister     No Known Problems Maternal Grandmother     No Known Problems Paternal Grandmother     No Known Problems Maternal Aunt     No Known Problems Paternal Aunt        Social History     Occupational History    Not on file   Tobacco Use    Smoking status: Former Smoker     Packs/day: 0 50     Start date: 36     Quit date: 06/2017     Years since quitting: 3 6    Smokeless tobacco: Never Used   Substance and Sexual Activity    Alcohol use: Yes     Frequency: 2-3 times a week     Drinks per session: 1 or 2     Comment: Moderate - As per Denton Kerns Drug use: Never     Comment: Illicit drugs:   no  - As per Denton Kerns Sexual activity: Not on file       Current Outpatient Medications on File Prior to Visit   Medication Sig    bisoprolol-hydrochlorothiazide [facility]) 10-6 25 MG per tablet Take 1 tablet by mouth daily    diclofenac sodium (VOLTAREN) 1 % Apply 2 g topically 3 (three) times a day as needed (Pain)    DULoxetine (CYMBALTA) 60 mg delayed release capsule Take 1 capsule (60 mg total) by mouth daily    gabapentin (NEURONTIN) 300 mg capsule TAKE ONE CAPSULE BY MOUTH 2 TIMES A DAY    LORazepam (ATIVAN) 0 5 mg tablet 1 TABLET BY MOUTH TWICE A DAY AS NEEDED FOR ANXIETY    [DISCONTINUED] cyclobenzaprine (FLEXERIL) 5 mg tablet Take 1 tablet (5 mg total) by mouth daily at bedtime as needed for muscle spasms    [DISCONTINUED] lidocaine (LIDODERM) 5 % Apply 1 patch topically daily Remove & Discard patch within 12 hours or as directed by DO (Patient not taking: Reported on 12/4/2020)     No current facility-administered medications on file prior to visit  No Known Allergies    Physical Exam    /78   Pulse 72   Wt (!) 145 kg (319 lb)   BMI 54 76 kg/m²     General:  Morbidly obese in no acute distress  Mental: Appropriate mood and affect  Grossly oriented with coherent speech and thought processing  Neuro:  Cranial nerves: Cranial nerve function is grossly intact bilaterally  Strength: Bilateral lower extremity strength is normal and symmetric  No atrophy or tone abnormalities noted  Reflexes: Bilateral lower extremity muscle stretch reflexes are physiologic and symmetric  No ankle clonus is noted  Sensation: No loss of sensation is noted  SLR/Foraminal Compression Maneuvers: Straight leg raising in the   supine position is  positive for radicular pain left lower extremity  Gait:  Gait/gross motor: Gait is antalgic  Station is forward flexed posture  Musculoskeletal:  Palpation: Inspection and palpation of the spine and extremities are remarkable for tenderness to palpation bilateral lumbar paraspinals, distal to PSIS bilaterally    POSITIVE Jenelle finger left-sided  POSITIVE Mannie's test left-sided  POSITIVE Gaenslen's maneuver left-sided    Spine: Decreased active and passive range of motion with lumbar flexion and extension limited by pain  No gross axial skeletal deformities  Skin: Skin inspection grossly negative for erythema, breakdown, or concerning lesions in affected area  Lymph: No lymphadenopathy is appreciated in the involved extremity  Vessels: No lower extremity edema  Lungs: Breathing is comfortable and regular  No dyspnea noted during examination  Eyes: Visual field grossly intact to confrontation  No redness appreciated  ENT: No craniofacial deformities or asymmetry  No neck masses appreciated  Imaging    MRI LUMBAR SPINE WITHOUT CONTRAST     INDICATION: M54 5: Low back pain  G89 29: Other chronic pain  S39 012D: Strain of muscle, fascia and tendon of lower back, subsequent encounter      COMPARISON:  Outside plain films comparison study dated 8/8/2019     TECHNIQUE:  Sagittal T1, sagittal T2, sagittal inversion recovery, axial T1 and axial T2, coronal T2     IMAGE QUALITY:  Diagnostic     FINDINGS:     VERTEBRAL BODIES:  There are 5 lumbar type vertebral bodies  Normal alignment of the lumbar spine  No spondylolysis or spondylolisthesis  No scoliosis  No compression fracture  Normal marrow signal is identified within the visualized bony   structures  No discrete marrow lesion      SACRUM:  Normal signal within the sacrum   No evidence of insufficiency or stress fracture      DISTAL CORD AND CONUS:  Normal size and signal within the distal cord and conus      PARASPINAL SOFT TISSUES:  Paraspinal soft tissues are unremarkable      LOWER THORACIC DISC SPACES:  Normal disc height and signal   No disc herniation, canal stenosis or foraminal narrowing      LUMBAR DISC SPACES:     L1-L2:  Disc bulge resulting in moderate central canal stenosis without foraminal narrowing      L2-L3:  Minor bulge without stenosis      L3-L4:  Prominent disc bulge with bilateral facet hypertrophy resulting in moderate to severe central canal encroachment as well as mild right and moderate left foraminal narrowing  Correlate clinically for left greater the right L3 radiculopathy      L4-L5:  Disc bulge asymmetric to the left with bilateral facet hypertrophy and ligamentum flavum infolding resulting in severe central canal encroachment including mild to moderate right and mild left foraminal stenosis      L5-S1:  Minor disc bulge and facet hypertrophy with ligamentum flavum infolding    Central canal and foramina remain patent      IMPRESSION:  Advanced spondylotic changes of the lumbar spine including severe central canal narrowing at the L4-5 level secondary to a large disc bulge with prominent facet hypertrophy/ligamentum flavum infolding

## 2021-01-06 NOTE — PATIENT INSTRUCTIONS
Lumbar Radiculopathy   WHAT YOU NEED TO KNOW:   Lumbar radiculopathy is a painful condition that happens when a nerve in your lumbar spine (lower back) is pinched or irritated  Nerves control feeling and movement in your body  You may have numbness or pain that shoots down from your lower back towards your foot  DISCHARGE INSTRUCTIONS:   Medicines:   · Medicines:     ? NSAIDs , such as ibuprofen, help decrease swelling, pain, and fever  This medicine is available with or without a doctor's order  NSAIDs can cause stomach bleeding or kidney problems in certain people  If you take blood thinner medicine, always ask your healthcare provider if NSAIDs are safe for you  Always read the medicine label and follow directions  ? Muscle relaxers  help decrease pain and muscle spasms  ? Opioids: This is a strong medicine given to reduce severe pain  It is also called narcotic pain medicine  Take this medicine exactly as directed by your healthcare provider  ? Oral steroids: Steroids may also be given to reduce pain and swelling  ? Take your medicine as directed  Contact your healthcare provider if you think your medicine is not helping or if you have side effects  Tell him of her if you are allergic to any medicine  Keep a list of the medicines, vitamins, and herbs you take  Include the amounts, and when and why you take them  Bring the list or the pill bottles to follow-up visits  Carry your medicine list with you in case of an emergency  Follow up with your healthcare provider or spine specialist within 1 to 3 weeks:  After your first follow-up appointment, return to your healthcare provider or spine specialist every 2 weeks until you have healed  Ask for information about physical therapy for your condition  Write down your questions so you remember to ask them during your visits  Physical therapy:  You may need physical therapy to improve your condition   Your physical therapist may teach you certain exercises to improve posture (the way you stand and sit), flexibility, and strength in your lower back  Self care:   · Stay active: It is best to be active when you have lumbar radiculopathy  Your physical therapist or healthcare provider may tell you to take walks to ease yourself back into your daily routine  Avoid long periods of bed rest  Bed rest could worsen your symptoms  Do not move in ways that increase your pain  Ask for more information about the best ways to stay active  · Use ice or heat packs:  Use ice or heat packs as directed on the sore area of your body to decrease the pain and swelling  Put ice in a plastic bag covered with a towel on your low back  Cover heated items with a towel to avoid burns  Use ice and heat as directed  · Avoid heavy lifting: Your condition may worsen if you lift heavy things  Avoid lifting if possible  · Maintain a healthy weight:  Excess body weight may strain your back  Talk with your healthcare provider about ways to lose excess weight if you are overweight  Contact your healthcare provider or spine specialist if:   · Your pain does not improve within 1 to 3 weeks after treatment  · Your pain and weakness keep you from your normal activities at work, home, or school  · You lose more than 10 pounds in 6 months without trying  · You become depressed or sad because of the pain  · You have questions or concerns about your condition or care  Return to the emergency department if:   · You have a fever greater than 100 4°F for longer than 2 days  · You have new, severe back or leg pain, or your pain spreads to both legs  · You have any new signs of numbness or weakness, especially in your lower back, legs, arms, or genital area  · You have new trouble controlling your urine and bowel movements  · You do not feel like your bladder empties when you urinate      © Copyright [employer] 2020 Information is for End User's use only and may not be sold, redistributed or otherwise used for commercial purposes  All illustrations and images included in CareNotes® are the copyrighted property of A [employer] A M [employer] Inc  or Archana Ochoa  The above information is an  only  It is not intended as medical advice for individual conditions or treatments  Talk to your doctor, nurse or pharmacist before following any medical regimen to see if it is safe and effective for you

## 2021-01-08 ENCOUNTER — IMMUNIZATIONS (OUTPATIENT)
Dept: FAMILY MEDICINE CLINIC | Facility: HOSPITAL | Age: 61
End: 2021-01-08

## 2021-01-08 DIAGNOSIS — Z23 ENCOUNTER FOR IMMUNIZATION: ICD-10-CM

## 2021-01-08 PROCEDURE — 91300 SARS-COV-2 / COVID-19 MRNA VACCINE (PFIZER-BIONTECH) 30 MCG: CPT

## 2021-01-08 PROCEDURE — 0002A SARS-COV-2 / COVID-19 MRNA VACCINE (PFIZER-BIONTECH) 30 MCG: CPT

## 2021-01-11 ENCOUNTER — TELEPHONE (OUTPATIENT)
Dept: PAIN MEDICINE | Facility: CLINIC | Age: 61
End: 2021-01-11

## 2021-01-11 NOTE — TELEPHONE ENCOUNTER
Pt called in to schedule her procedure  Please be advise thank you        Please call patient back @ work [phone]

## 2021-01-14 NOTE — TELEPHONE ENCOUNTER
Pt called in to schedule her procedure  Please be advise thank you        Please call patient back @ work [phone] until 5 pm

## 2021-01-18 ENCOUNTER — LAB (OUTPATIENT)
Dept: LAB | Facility: HOSPITAL | Age: 61
End: 2021-01-18
Attending: FAMILY MEDICINE
Payer: COMMERCIAL

## 2021-01-18 DIAGNOSIS — I10 ESSENTIAL HYPERTENSION: ICD-10-CM

## 2021-01-18 DIAGNOSIS — M47.816 FACET HYPERTROPHY OF LUMBAR REGION: Primary | ICD-10-CM

## 2021-01-18 DIAGNOSIS — E55.9 VITAMIN D DEFICIENCY: ICD-10-CM

## 2021-01-18 DIAGNOSIS — S39.012D STRAIN OF LUMBAR REGION, SUBSEQUENT ENCOUNTER: ICD-10-CM

## 2021-01-18 LAB
25(OH)D3 SERPL-MCNC: 12.8 NG/ML (ref 30–100)
ALBUMIN SERPL BCP-MCNC: 4 G/DL (ref 3.4–4.8)
ALP SERPL-CCNC: 79.8 U/L (ref 35–140)
ALT SERPL W P-5'-P-CCNC: 15 U/L (ref 5–54)
ANION GAP SERPL CALCULATED.3IONS-SCNC: 6 MMOL/L (ref 4–13)
AST SERPL W P-5'-P-CCNC: 20 U/L (ref 15–41)
BILIRUB SERPL-MCNC: 0.49 MG/DL (ref 0.3–1.2)
BUN SERPL-MCNC: 11 MG/DL (ref 6–20)
CALCIUM SERPL-MCNC: 8.9 MG/DL (ref 8.4–10.2)
CHLORIDE SERPL-SCNC: 106 MMOL/L (ref 96–108)
CO2 SERPL-SCNC: 29 MMOL/L (ref 22–33)
CREAT SERPL-MCNC: 0.82 MG/DL (ref 0.4–1.1)
GFR SERPL CREATININE-BSD FRML MDRD: 78 ML/MIN/1.73SQ M
GLUCOSE P FAST SERPL-MCNC: 108 MG/DL (ref 70–105)
POTASSIUM SERPL-SCNC: 3.8 MMOL/L (ref 3.5–5)
PROT SERPL-MCNC: 6.9 G/DL (ref 6.4–8.3)
SODIUM SERPL-SCNC: 141 MMOL/L (ref 133–145)

## 2021-01-18 PROCEDURE — 80053 COMPREHEN METABOLIC PANEL: CPT

## 2021-01-18 PROCEDURE — 82306 VITAMIN D 25 HYDROXY: CPT

## 2021-01-18 PROCEDURE — 36415 COLL VENOUS BLD VENIPUNCTURE: CPT

## 2021-01-18 RX ORDER — CELECOXIB 200 MG/1
200 CAPSULE ORAL DAILY
Qty: 30 CAPSULE | Refills: 1 | Status: SHIPPED | OUTPATIENT
Start: 2021-01-18 | End: 2021-07-21 | Stop reason: ALTCHOICE

## 2021-01-18 NOTE — TELEPHONE ENCOUNTER
Scheduled pt for LESI for 2/17/21  Pt denies rx blood thinners  Went over pre-procedure instructions below:  Nothing to eat or drink 1 hr prior to procedure  Need to arrange transportation  Proper clothing for procedure  If ill or placed on antibiotics please call to reschedule  Covid/travel/ and vaccine instructions    Pt is asking for something for pain since we dont have an opening until 2/17/21  Please advise

## 2021-01-18 NOTE — TELEPHONE ENCOUNTER
Pt called in to schedule her procedure  Please be advise thank you        Please call patient back @ [phone] or Cell [phone]

## 2021-01-19 DIAGNOSIS — E55.9 VITAMIN D DEFICIENCY: Primary | ICD-10-CM

## 2021-01-19 RX ORDER — ERGOCALCIFEROL (VITAMIN D2) 1250 MCG
50000 CAPSULE ORAL WEEKLY
Qty: 12 CAPSULE | Refills: 0 | Status: SHIPPED | OUTPATIENT
Start: 2021-01-19 | End: 2021-07-27

## 2021-01-19 RX ORDER — ERGOCALCIFEROL (VITAMIN D2) 1250 MCG
50000 CAPSULE ORAL WEEKLY
COMMUNITY
End: 2021-01-19 | Stop reason: SDUPTHER

## 2021-01-19 NOTE — TELEPHONE ENCOUNTER
Patient called returning the nurses call  Please be advise thank you        Please call patient back @ [phone]

## 2021-01-19 NOTE — TELEPHONE ENCOUNTER
MELVIN    S/w pt and advised of Dr Zach Gamboa' notation  Pt verbalized understanding  Pt was advised that Celebrex is an NSAID, do not combine with other NSAIDs such as Ibuprofen/Advil/Motrin/Aleve/Naproxen  Pt verbalized understanding and states that she did take Ibuprofen already today, she will start the Celebrex tomorrow to be safe

## 2021-01-25 DIAGNOSIS — M47.816 FACET HYPERTROPHY OF LUMBAR REGION: Primary | ICD-10-CM

## 2021-01-25 DIAGNOSIS — M79.7 FIBROMYALGIA, PRIMARY: ICD-10-CM

## 2021-01-25 RX ORDER — METHYLPREDNISOLONE 4 MG/1
TABLET ORAL
Qty: 1 EACH | Refills: 0 | Status: SHIPPED | OUTPATIENT
Start: 2021-01-25 | End: 2021-07-21 | Stop reason: ALTCHOICE

## 2021-01-25 NOTE — TELEPHONE ENCOUNTER
The Celebrex is not working at all  Pt has no relief  Pt is having trouble working the pain is so bad  It feels like someone is pushing on her tail bone  Pt pain level is a 10/10      Pt # [phone]

## 2021-02-17 ENCOUNTER — HOSPITAL ENCOUNTER (OUTPATIENT)
Dept: RADIOLOGY | Facility: CLINIC | Age: 61
Discharge: HOME | End: 2021-02-17
Attending: PHYSICAL MEDICINE & REHABILITATION | Admitting: PHYSICAL MEDICINE & REHABILITATION
Payer: COMMERCIAL

## 2021-02-17 VITALS
RESPIRATION RATE: 20 BRPM | HEART RATE: 69 BPM | TEMPERATURE: 98.6 F | SYSTOLIC BLOOD PRESSURE: 136 MMHG | DIASTOLIC BLOOD PRESSURE: 96 MMHG | OXYGEN SATURATION: 96 %

## 2021-02-17 DIAGNOSIS — M47.816 FACET HYPERTROPHY OF LUMBAR REGION: ICD-10-CM

## 2021-02-17 DIAGNOSIS — M51.26 LUMBAR DISC HERNIATION: ICD-10-CM

## 2021-02-17 DIAGNOSIS — M48.061 SPINAL STENOSIS OF LUMBAR REGION WITHOUT NEUROGENIC CLAUDICATION: ICD-10-CM

## 2021-02-17 PROCEDURE — 62323 NJX INTERLAMINAR LMBR/SAC: CPT | Performed by: PHYSICAL MEDICINE & REHABILITATION

## 2021-02-17 RX ORDER — 0.9 % SODIUM CHLORIDE 0.9 %
10 VIAL (ML) INJECTION ONCE
Status: COMPLETED | OUTPATIENT
Start: 2021-02-17 | End: 2021-02-17

## 2021-02-17 RX ORDER — LIDOCAINE HYDROCHLORIDE 10 MG/ML
5 INJECTION, SOLUTION EPIDURAL; INFILTRATION; INTRACAUDAL; PERINEURAL ONCE
Status: COMPLETED | OUTPATIENT
Start: 2021-02-17 | End: 2021-02-17

## 2021-02-17 RX ORDER — METHYLPREDNISOLONE ACETATE 80 MG/ML
80 INJECTION, SUSPENSION INTRA-ARTICULAR; INTRALESIONAL; INTRAMUSCULAR; PARENTERAL; SOFT TISSUE ONCE
Status: COMPLETED | OUTPATIENT
Start: 2021-02-17 | End: 2021-02-17

## 2021-02-17 RX ADMIN — METHYLPREDNISOLONE ACETATE 80 MG: 80 INJECTION, SUSPENSION INTRA-ARTICULAR; INTRALESIONAL; INTRAMUSCULAR; PARENTERAL; SOFT TISSUE at 09:04

## 2021-02-17 RX ADMIN — IOHEXOL 2 ML: 300 INJECTION, SOLUTION INTRAVENOUS at 09:04

## 2021-02-17 RX ADMIN — SODIUM CHLORIDE 1 ML: 9 INJECTION, SOLUTION INTRAMUSCULAR; INTRAVENOUS; SUBCUTANEOUS at 09:04

## 2021-02-17 RX ADMIN — LIDOCAINE HYDROCHLORIDE 4 ML: 10 INJECTION, SOLUTION EPIDURAL; INFILTRATION; INTRACAUDAL; PERINEURAL at 08:59

## 2021-02-17 NOTE — DISCHARGE INSTR - LAB
Epidural Steroid Injection   WHAT YOU NEED TO KNOW:   An epidural steroid injection (GERMAINE) is a procedure to inject steroid medicine into the epidural space  The epidural space is between your spinal cord and vertebrae  Steroids reduce inflammation and fluid buildup in your spine that may be causing pain  You may be given pain medicine along with the steroids  ACTIVITY  · Do not drive or operate machinery today  · No strenuous activity today - bending, lifting, etc   · You may resume normal activites starting tomorrow - start slowly and as tolerated  · You may shower today, but no tub baths or hot tubs  · You may have numbness for several hours from the local anesthetic  Please use caution and common sense, especially with weight-bearing activities  CARE OF THE INJECTION SITE  · If you have soreness or pain, apply ice to the area today (20 minutes on/20 minutes off)  · Starting tomorrow, you may use warm, moist heat or ice if needed  · You may have an increase or change in your discomfort for 36-48 hours after your treatment  · Apply ice and continue with any pain medication you have been prescribed  · Notify [facility] if you have any of the following: redness, drainage, swelling, headache, stiff neck or fever above 100°F     SPECIAL INSTRUCTIONS  · Our office will contact you in approximately 7 days for a progress report  MEDICATIONS  · Continue to take all routine medications  · Our office may have instructed you to hold some medications  If you have a problem specifically related to your procedure, please call our office at [phone]  Problems not related to your procedure should be directed to your primary care physician

## 2021-02-17 NOTE — H&P
History of Present Illness:  The patient is a 61 y o  female who presents with complaints of  Low back pain    Patient Active Problem List   Diagnosis    Breast lump on right side at 10 o'clock position    Essential hypertension    Generalized anxiety disorder    Fibromyalgia, primary    Strain of lumbar region    Chronic bilateral low back pain without sciatica    Facet hypertrophy of lumbar region       Past Medical History:   Diagnosis Date    Anxiety     Fibromyalgia     High blood pressure     Hypertension     Panic attacks        Past Surgical History:   Procedure Laterality Date    CHOLECYSTECTOMY      KNEE SURGERY      HERIBERTO-EN-Y PROCEDURE      SHOULDER OPEN ROTATOR CUFF REPAIR      SPINAL FUSION      TUBAL LIGATION           Current Outpatient Medications:     bisoprolol-hydrochlorothiazide (ZIAC) 10-6 25 MG per tablet, Take 1 tablet by mouth daily, Disp: 90 tablet, Rfl: 2    celecoxib (CeleBREX) 200 mg capsule, Take 1 capsule (200 mg total) by mouth daily, Disp: 30 capsule, Rfl: 1    diclofenac sodium (VOLTAREN) 1 %, Apply 2 g topically 3 (three) times a day as needed (Pain), Disp: 100 g, Rfl: 1    Diclofenac Sodium (VOLTAREN) 1 %, APPLY 2 G TOPICALLY 3 (THREE) TIMES A DAY AS NEEDED (PAIN), Disp: , Rfl:     DULoxetine (CYMBALTA) 60 mg delayed release capsule, Take 1 capsule (60 mg total) by mouth daily, Disp: 90 capsule, Rfl: 2    ergocalciferol (ERGOCALCIFEROL) 1 25 MG (88760 UT) capsule, Take 1 capsule (50,000 Units total) by mouth once a week, Disp: 12 capsule, Rfl: 0    gabapentin (NEURONTIN) 300 mg capsule, TAKE ONE CAPSULE BY MOUTH 2 TIMES A DAY, Disp: 180 capsule, Rfl: 0    LORazepam (ATIVAN) 0 5 mg tablet, 1 TABLET BY MOUTH TWICE A DAY AS NEEDED FOR ANXIETY, Disp: , Rfl:     methylPREDNISolone 4 MG tablet therapy pack, Use as directed on package, Disp: 1 each, Rfl: 0    Current Facility-Administered Medications:     iohexol (OMNIPAQUE) 300 mg/mL injection 50 mL, 50 mL, Epidural, Once, Alona Angel, DO    lidocaine (PF) (XYLOCAINE-MPF) 1 % injection 5 mL, 5 mL, Infiltration, Once, Alona Angel, DO    methylPREDNISolone acetate (DEPO-MEDROL) injection 80 mg, 80 mg, Epidural, Once, Alona Angel, DO    sodium chloride (PF) 0 9 % injection 10 mL, 10 mL, Epidural, Once, Alona Angel, DO    No Known Allergies    Physical Exam:   Vitals:    02/17/21 0838   BP: 140/77   Pulse: 67   Resp: 18   Temp: 98 6 °F (37 °C)   SpO2: 96%     General: Awake, Alert, Oriented x 3, Mood and affect appropriate  Respiratory: Respirations even and unlabored  Cardiovascular: Peripheral pulses intact; no edema  Musculoskeletal Exam:  Tenderness to palpation  Bilateral lumbar paraspinals    ASA Score:2   Patient has been made explicitly aware that the injection will consist of steroid which may cause a temporary suppression in immune system activity  This, in turn, may increase the patient's risk of leslee corona virus  He was advised to continue with social distancing and self quarantine  Patient wishes to proceed with the injection    Patient/Chart Verification  Patient ID Verified: Verbal  ID Band Applied: No  Consents Confirmed: Procedural, To be obtained in the Pre-Procedure area  H&P( within 30 days) Verified: To be obtained in the Pre-Procedure area  Interval H&P(within 24 hr) Complete (required for Outpatients and Surgery Admit only): To be obtained in the Pre-Procedure area  Allergies Reviewed: Yes  Anticoag/NSAID held?: NA  Currently on antibiotics?: No  Pregnancy denied?: NA    Assessment:   1  Facet hypertrophy of lumbar region    2  Lumbar disc herniation    3   Spinal stenosis of lumbar region without neurogenic claudication        Plan: BANDAR

## 2021-02-24 ENCOUNTER — TELEPHONE (OUTPATIENT)
Dept: PAIN MEDICINE | Facility: CLINIC | Age: 61
End: 2021-02-24

## 2021-02-26 NOTE — TELEPHONE ENCOUNTER
Pt reports 40% improvement post inj   Pain level 7/10   Pt aware I will call next week for an update

## 2021-03-19 ENCOUNTER — TRANSCRIBE ORDERS (OUTPATIENT)
Dept: ADMINISTRATIVE | Facility: HOSPITAL | Age: 61
End: 2021-03-19

## 2021-03-19 ENCOUNTER — APPOINTMENT (OUTPATIENT)
Dept: LAB | Facility: HOSPITAL | Age: 61
End: 2021-03-19

## 2021-03-19 DIAGNOSIS — Z11.1 SCREENING EXAMINATION FOR PULMONARY TUBERCULOSIS: ICD-10-CM

## 2021-03-19 DIAGNOSIS — Z01.84 IMMUNITY STATUS TESTING: ICD-10-CM

## 2021-03-19 DIAGNOSIS — Z01.84 IMMUNITY STATUS TESTING: Primary | ICD-10-CM

## 2021-03-19 LAB — RUBV IGG SERPL IA-ACNC: 79.1 IU/ML

## 2021-03-19 PROCEDURE — 86765 RUBEOLA ANTIBODY: CPT

## 2021-03-19 PROCEDURE — 86480 TB TEST CELL IMMUN MEASURE: CPT

## 2021-03-19 PROCEDURE — 86762 RUBELLA ANTIBODY: CPT

## 2021-03-19 PROCEDURE — 86735 MUMPS ANTIBODY: CPT

## 2021-03-19 PROCEDURE — 86787 VARICELLA-ZOSTER ANTIBODY: CPT

## 2021-03-19 PROCEDURE — 36415 COLL VENOUS BLD VENIPUNCTURE: CPT

## 2021-03-22 LAB
GAMMA INTERFERON BACKGROUND BLD IA-ACNC: 0.02 IU/ML
M TB IFN-G BLD-IMP: NEGATIVE
M TB IFN-G CD4+ BCKGRND COR BLD-ACNC: 0 IU/ML
M TB IFN-G CD4+ BCKGRND COR BLD-ACNC: 0 IU/ML
MITOGEN IGNF BCKGRD COR BLD-ACNC: >10 IU/ML

## 2021-03-23 LAB
MEV IGG SER QL: NORMAL
MUV IGG SER QL: NORMAL
VZV IGG SER IA-ACNC: NORMAL

## 2021-04-07 ENCOUNTER — TELEPHONE (OUTPATIENT)
Dept: FAMILY MEDICINE CLINIC | Facility: CLINIC | Age: 61
End: 2021-04-07

## 2021-04-07 DIAGNOSIS — F41.9 ANXIETY: Primary | ICD-10-CM

## 2021-04-07 RX ORDER — LORAZEPAM 0.5 MG/1
0.5 TABLET ORAL 2 TIMES DAILY
Qty: 60 TABLET | Refills: 0 | Status: SHIPPED | OUTPATIENT
Start: 2021-04-07 | End: 2021-07-27 | Stop reason: SDUPTHER

## 2021-04-13 ENCOUNTER — TRANSCRIBE ORDERS (OUTPATIENT)
Dept: PAIN MEDICINE | Facility: CLINIC | Age: 61
End: 2021-04-13

## 2021-04-13 ENCOUNTER — OFFICE VISIT (OUTPATIENT)
Dept: PAIN MEDICINE | Facility: CLINIC | Age: 61
End: 2021-04-13
Payer: COMMERCIAL

## 2021-04-13 VITALS — HEART RATE: 66 BPM | SYSTOLIC BLOOD PRESSURE: 142 MMHG | DIASTOLIC BLOOD PRESSURE: 71 MMHG

## 2021-04-13 DIAGNOSIS — M48.062 SPINAL STENOSIS OF LUMBAR REGION WITH NEUROGENIC CLAUDICATION: Primary | ICD-10-CM

## 2021-04-13 DIAGNOSIS — M51.26 LUMBAR DISC HERNIATION: ICD-10-CM

## 2021-04-13 DIAGNOSIS — R52 PAIN: ICD-10-CM

## 2021-04-13 PROCEDURE — 99214 OFFICE O/P EST MOD 30 MIN: CPT | Performed by: PHYSICAL MEDICINE & REHABILITATION

## 2021-04-13 RX ORDER — GABAPENTIN 600 MG/1
600 TABLET ORAL 3 TIMES DAILY
Qty: 90 TABLET | Refills: 2 | Status: SHIPPED | OUTPATIENT
Start: 2021-04-13 | End: 2021-06-22 | Stop reason: SDUPTHER

## 2021-04-13 NOTE — PROGRESS NOTES
Assessment:  1  Spinal stenosis of lumbar region with neurogenic claudication    2  Lumbar disc herniation    3  Pain        Plan:  Ms Rosa Lee  Is a pleasant 22-year-old female who presents for follow-up and re-evaluation regarding low back pain with radiating symptoms into the left lower extremity  We previously performed a lumbar epidural steroid injection which provided daily what both of significant relief in her pain  Over the last several weeks her pain has gradually returned to previous level  At this time we will   1  Plan for repeat lumbar epidural steroid injection this time from L4-L5 left paramedian approach  2  Will also refer to Dr Pita Lara  For surgical considerations   3  Will increase gabapentin to 600 mg t i d   4  Complete risks and benefits including bleeding, infection, tissue reaction, nerve injury and allergic reaction were discussed  The approach was demonstrated using models and literature was provided  Verbal and written consent was obtained  My impressions and treatment recommendations were discussed in detail with the patient who verbalized understanding and had no further questions  Discharge instructions were provided  I personally saw and examined the patient and I agree with the above discussed plan of care  Orders Placed This Encounter   Procedures    [place] spine and pain procedure     Standing Status:   Future     Standing Expiration Date:   4/13/2025     Order Specific Question:   Reason for Exam:     Answer:   LESI (L4-L5)     Order Specific Question:   Is the patient pregnant? Answer:   No     Order Specific Question:   Anticoagulant hold needed?      Answer:   No    Ambulatory referral to Orthopedic Surgery     Standing Status:   Future     Standing Expiration Date:   4/13/2022     Referral Priority:   Routine     Referral Type:   Consult - AMB     Referral Reason:   Specialty Services Required     Referred to Provider:   Sidney Wooten MD     Requested Specialty: Orthopedic Surgery     Number of Visits Requested:   1     Expiration Date:   4/13/2022     New Medications Ordered This Visit   Medications    gabapentin (NEURONTIN) 600 MG tablet     Sig: Take 1 tablet (600 mg total) by mouth 3 (three) times a day     Dispense:  90 tablet     Refill:  2       History of Present Illness:  Vic Johnson is a 61 y o  female who presents for a follow up office visit in regards to Back Pain and Leg Pain (left side)  The patients current symptoms include  Low back pain with radiating symptoms into the left lower extremity currently rated 9/10 and interfering with daily activities  Pain is described as a constant numbness, sharp, throbbing, pressure-like sensation that is worse in the morning  We previously performed a lumbar epidural steroid injection which lasted for approximately 1 month with greater than 75% relief in her pain and the pain has gradually returned  Presents for follow-up  I have personally reviewed and/or updated the patient's past medical history, past surgical history, family history, social history, current medications, allergies, and vital signs today  Review of Systems   Respiratory: Negative for shortness of breath  Cardiovascular: Negative for chest pain  Gastrointestinal: Negative for constipation, diarrhea, nausea and vomiting  Musculoskeletal: Positive for back pain, gait problem and joint swelling  Negative for arthralgias and myalgias  Skin: Negative for rash  Neurological: Negative for dizziness, seizures and weakness  All other systems reviewed and are negative        Patient Active Problem List   Diagnosis    Breast lump on right side at 10 o'clock position    Essential hypertension    Generalized anxiety disorder    Fibromyalgia, primary    Strain of lumbar region    Chronic bilateral low back pain without sciatica    Facet hypertrophy of lumbar region    Lumbar disc herniation    Spinal stenosis of lumbar region without neurogenic claudication       Past Medical History:   Diagnosis Date    Anxiety     Fibromyalgia     High blood pressure     Hypertension     Panic attacks        Past Surgical History:   Procedure Laterality Date    CHOLECYSTECTOMY      KNEE SURGERY      HERIBERTO-EN-Y PROCEDURE      SHOULDER OPEN ROTATOR CUFF REPAIR      SPINAL FUSION      TUBAL LIGATION         Family History   Problem Relation Age of Onset    Hypertension Mother     Heart attack Father         late 46s     Hypertension Brother     No Known Problems Sister     No Known Problems Maternal Grandmother     No Known Problems Paternal Grandmother     No Known Problems Maternal Aunt     No Known Problems Paternal Aunt        Social History     Occupational History    Not on file   Tobacco Use    Smoking status: Former Smoker     Packs/day: 0 50     Start date: 36     Quit date: 06/2017     Years since quitting: 3 8    Smokeless tobacco: Never Used   Substance and Sexual Activity    Alcohol use: Yes     Frequency: 2-3 times a week     Drinks per session: 1 or 2     Comment: Moderate - As per Keven Cast Drug use: Never     Comment: Illicit drugs:   no  - As per Keven Cast Sexual activity: Not on file       Current Outpatient Medications on File Prior to Visit   Medication Sig    bisoprolol-hydrochlorothiazide (ZIAC) 10-6 25 MG per tablet Take 1 tablet by mouth daily    diclofenac sodium (VOLTAREN) 1 % Apply 2 g topically 3 (three) times a day as needed (Pain)    Diclofenac Sodium (VOLTAREN) 1 % APPLY 2 G TOPICALLY 3 (THREE) TIMES A DAY AS NEEDED (PAIN)    DULoxetine (CYMBALTA) 60 mg delayed release capsule Take 1 capsule (60 mg total) by mouth daily    ergocalciferol (ERGOCALCIFEROL) 1 25 MG (71404 UT) capsule Take 1 capsule (50,000 Units total) by mouth once a week    LORazepam (ATIVAN) 0 5 mg tablet Take 1 tablet (0 5 mg total) by mouth 2 (two) times a day    [DISCONTINUED] gabapentin (NEURONTIN) 300 mg capsule TAKE ONE CAPSULE BY MOUTH 2 TIMES A DAY    celecoxib (CeleBREX) 200 mg capsule Take 1 capsule (200 mg total) by mouth daily    methylPREDNISolone 4 MG tablet therapy pack Use as directed on package     No current facility-administered medications on file prior to visit  No Known Allergies    Physical Exam:    /71   Pulse 66     Constitutional:normal, well developed, well nourished, alert, in no distress and non-toxic and no overt pain behavior   and obese  Eyes:anicteric  HEENT:grossly intact  Neck:supple, symmetric, trachea midline and no masses   Pulmonary:even and unlabored  Cardiovascular:No edema or pitting edema present  Skin:Normal without rashes or lesions and well hydrated  Psychiatric:Mood and affect appropriate  Neurologic:Cranial Nerves II-XII grossly intact  Musculoskeletal:antalgic    Imaging

## 2021-04-13 NOTE — PATIENT INSTRUCTIONS
Lumbar Radiculopathy   WHAT YOU NEED TO KNOW:   Lumbar radiculopathy is a painful condition that happens when a nerve in your lumbar spine (lower back) is pinched or irritated  Nerves control feeling and movement in your body  You may have numbness or pain that shoots down from your lower back towards your foot  DISCHARGE INSTRUCTIONS:   Medicines:   · Medicines:     ? NSAIDs , such as ibuprofen, help decrease swelling, pain, and fever  This medicine is available with or without a doctor's order  NSAIDs can cause stomach bleeding or kidney problems in certain people  If you take blood thinner medicine, always ask your healthcare provider if NSAIDs are safe for you  Always read the medicine label and follow directions  ? Muscle relaxers  help decrease pain and muscle spasms  ? Opioids: This is a strong medicine given to reduce severe pain  It is also called narcotic pain medicine  Take this medicine exactly as directed by your healthcare provider  ? Oral steroids: Steroids may also be given to reduce pain and swelling  ? Take your medicine as directed  Contact your healthcare provider if you think your medicine is not helping or if you have side effects  Tell him of her if you are allergic to any medicine  Keep a list of the medicines, vitamins, and herbs you take  Include the amounts, and when and why you take them  Bring the list or the pill bottles to follow-up visits  Carry your medicine list with you in case of an emergency  Follow up with your healthcare provider or spine specialist within 1 to 3 weeks:  After your first follow-up appointment, return to your healthcare provider or spine specialist every 2 weeks until you have healed  Ask for information about physical therapy for your condition  Write down your questions so you remember to ask them during your visits  Physical therapy:  You may need physical therapy to improve your condition   Your physical therapist may teach you certain exercises to improve posture (the way you stand and sit), flexibility, and strength in your lower back  Self care:   · Stay active: It is best to be active when you have lumbar radiculopathy  Your physical therapist or healthcare provider may tell you to take walks to ease yourself back into your daily routine  Avoid long periods of bed rest  Bed rest could worsen your symptoms  Do not move in ways that increase your pain  Ask for more information about the best ways to stay active  · Use ice or heat packs:  Use ice or heat packs as directed on the sore area of your body to decrease the pain and swelling  Put ice in a plastic bag covered with a towel on your low back  Cover heated items with a towel to avoid burns  Use ice and heat as directed  · Avoid heavy lifting: Your condition may worsen if you lift heavy things  Avoid lifting if possible  · Maintain a healthy weight:  Excess body weight may strain your back  Talk with your healthcare provider about ways to lose excess weight if you are overweight  Contact your healthcare provider or spine specialist if:   · Your pain does not improve within 1 to 3 weeks after treatment  · Your pain and weakness keep you from your normal activities at work, home, or school  · You lose more than 10 pounds in 6 months without trying  · You become depressed or sad because of the pain  · You have questions or concerns about your condition or care  Return to the emergency department if:   · You have a fever greater than 100 4°F for longer than 2 days  · You have new, severe back or leg pain, or your pain spreads to both legs  · You have any new signs of numbness or weakness, especially in your lower back, legs, arms, or genital area  · You have new trouble controlling your urine and bowel movements  · You do not feel like your bladder empties when you urinate      © Copyright [employer] 2020 Information is for End User's use only and may not be sold, redistributed or otherwise used for commercial purposes  All illustrations and images included in CareNotes® are the copyrighted property of [employer]  or Archana Ochoa  The above information is an  only  It is not intended as medical advice for individual conditions or treatments  Talk to your doctor, nurse or pharmacist before following any medical regimen to see if it is safe and effective for you

## 2021-04-15 ENCOUNTER — TELEPHONE (OUTPATIENT)
Dept: PAIN MEDICINE | Facility: CLINIC | Age: 61
End: 2021-04-15

## 2021-04-15 NOTE — TELEPHONE ENCOUNTER
Scheduled pt for L4 L5 LESI for 5/26/21  Pt denies rx blood thinners  Went over pre-procedure instructions below:  Nothing to eat or drink 1 hr prior to procedure  Need to arrange transportation  Proper clothing for procedure  If ill or placed on antibiotics please call to reschedule  Pt had covid booster on January  Covid/travel/ and vaccine instructions    Pt had last Epidural on 2/17/21 which provided approx 50-60% relief  Pt has continued her home exercise program since at least 3 times a week  She also has been using ice   Current pain level is at a 8

## 2021-04-28 ENCOUNTER — TRANSCRIBE ORDERS (OUTPATIENT)
Dept: ADMINISTRATIVE | Facility: HOSPITAL | Age: 61
End: 2021-04-28

## 2021-04-28 ENCOUNTER — APPOINTMENT (OUTPATIENT)
Dept: LAB | Facility: HOSPITAL | Age: 61
End: 2021-04-28

## 2021-04-28 ENCOUNTER — APPOINTMENT (OUTPATIENT)
Dept: LAB | Facility: HOSPITAL | Age: 61
End: 2021-04-28
Attending: FAMILY MEDICINE
Payer: COMMERCIAL

## 2021-04-28 DIAGNOSIS — Z00.8 HEALTH EXAMINATION IN POPULATION SURVEY: Primary | ICD-10-CM

## 2021-04-28 DIAGNOSIS — Z00.8 HEALTH EXAMINATION IN POPULATION SURVEY: ICD-10-CM

## 2021-04-28 DIAGNOSIS — E55.9 VITAMIN D DEFICIENCY: ICD-10-CM

## 2021-04-28 LAB
CHOLEST SERPL-MCNC: 170 MG/DL
HDLC SERPL-MCNC: 42 MG/DL
LDLC SERPL CALC-MCNC: 85 MG/DL (ref 0–100)
NONHDLC SERPL-MCNC: 128 MG/DL
TRIGL SERPL-MCNC: 214.2 MG/DL

## 2021-04-28 PROCEDURE — 80061 LIPID PANEL: CPT

## 2021-04-28 PROCEDURE — 36415 COLL VENOUS BLD VENIPUNCTURE: CPT

## 2021-04-28 PROCEDURE — 83036 HEMOGLOBIN GLYCOSYLATED A1C: CPT

## 2021-04-28 PROCEDURE — 82306 VITAMIN D 25 HYDROXY: CPT

## 2021-04-29 ENCOUNTER — TELEPHONE (OUTPATIENT)
Dept: FAMILY MEDICINE CLINIC | Facility: CLINIC | Age: 61
End: 2021-04-29

## 2021-04-29 LAB
25(OH)D3 SERPL-MCNC: 23.2 NG/ML (ref 30–100)
EST. AVERAGE GLUCOSE BLD GHB EST-MCNC: 117 MG/DL
HBA1C MFR BLD: 5.7 %

## 2021-05-05 ENCOUNTER — OFFICE VISIT (OUTPATIENT)
Dept: OBGYN CLINIC | Facility: CLINIC | Age: 61
End: 2021-05-05
Payer: COMMERCIAL

## 2021-05-05 VITALS
HEIGHT: 64 IN | HEART RATE: 73 BPM | DIASTOLIC BLOOD PRESSURE: 70 MMHG | SYSTOLIC BLOOD PRESSURE: 163 MMHG | WEIGHT: 293 LBS | BODY MASS INDEX: 50.02 KG/M2

## 2021-05-05 DIAGNOSIS — G89.29 CHRONIC BILATERAL LOW BACK PAIN WITH BILATERAL SCIATICA: Primary | ICD-10-CM

## 2021-05-05 DIAGNOSIS — M54.42 CHRONIC BILATERAL LOW BACK PAIN WITH BILATERAL SCIATICA: Primary | ICD-10-CM

## 2021-05-05 DIAGNOSIS — M48.062 SPINAL STENOSIS OF LUMBAR REGION WITH NEUROGENIC CLAUDICATION: ICD-10-CM

## 2021-05-05 DIAGNOSIS — M54.41 CHRONIC BILATERAL LOW BACK PAIN WITH BILATERAL SCIATICA: Primary | ICD-10-CM

## 2021-05-05 DIAGNOSIS — M54.16 LUMBAR RADICULOPATHY: ICD-10-CM

## 2021-05-05 DIAGNOSIS — M51.26 LUMBAR DISC HERNIATION: ICD-10-CM

## 2021-05-05 PROCEDURE — 99214 OFFICE O/P EST MOD 30 MIN: CPT | Performed by: ORTHOPAEDIC SURGERY

## 2021-05-05 NOTE — PROGRESS NOTES
Assessment/Plan:      Multilevel lumbar spinal stenosis most pronounced at L3-4, L4-5, L5-S1 to a lesser extent   most severe stenosis is at L4-5 where there is a grade 1 spondylolisthesis   treatment options were discussed including continued epidural steroid injections versus surgical intervention which would likely require laminectomy plus or minus instrumented fusion   she understands that her BMI of over 52 is a significant issue regarding any potential for future surgical intervention and she is advised to lose weight prior to consideration for any surgical intervention  Fortunately she denies bowel or bladder dysfunction and on clinical exam demonstrates 5/5 strength  She will follow up after her next injection for re-evaluation  No problem-specific Assessment & Plan notes found for this encounter  Chronic low back pain with left > right leg pain  · S/p L5-S1 ILESI with Dr Lira  · 2-3 weeks of good benefit  · Lumbar MRI reviewed with patient demonstrating significant stenotic changes at L4-L5 level  · Patient encouraged to reduce BMI as this would benefit conservative care as well as any future surgical outcome  · Surgery is not currently recommended yet may be required in future   · Laminectomy with possible need for fusion  · Follow up 4-6 weeks       Problem List Items Addressed This Visit        Musculoskeletal and Integument    Lumbar disc herniation      Other Visit Diagnoses     Chronic bilateral low back pain with bilateral sciatica    -  Primary    Spinal stenosis of lumbar region with neurogenic claudication        Lumbar radiculopathy                Subjective:      Patient ID: Washington Gillespie is a 61 y o  female  HPI   The patient presents for initial evaluation of lumbar spine  She is here from Dr Lira  She has had symptoms for one year and worse over time with no injury    Today she complains of low back, sacrum pain with left > right lateral thigh, shin pain and bilateral foot numbness  She rates her pain at 5/10 and 10/10 at its worse  Her sleep is effect by her pain  She describes the pain as pressure  Most activity aggravates while rest alleviates  She does use Cymbalta, gabapentin 600mg 3x/day and IBU 800mgwith some benefit  She did participate in physical therapy with some benefit  She has has 1x lumbar GERMAINE with 50% relief for 2-3 weeks and plans for second  She denies past lumbar surgery  She denies recent bowel or bladder changes  The following portions of the patient's history were reviewed and updated as appropriate: allergies, current medications, past family history, past medical history, past social history, past surgical history and problem list     Review of Systems   Constitutional: Negative for chills, fever and unexpected weight change  HENT: Negative for hearing loss, nosebleeds and sore throat  Eyes: Negative for pain, redness and visual disturbance  Respiratory: Negative for cough, shortness of breath and wheezing  Cardiovascular: Negative for chest pain, palpitations and leg swelling  Gastrointestinal: Negative for abdominal pain, nausea and vomiting  Genitourinary: Negative for dyspareunia, dysuria and frequency  Skin: Negative for rash and wound  Neurological: Negative for dizziness, numbness and headaches  Psychiatric/Behavioral: Negative for decreased concentration and suicidal ideas  The patient is not nervous/anxious  Objective:      /70   Pulse 73   Ht 5' 4" (1 626 m)   Wt (!) 138 kg (305 lb)   BMI 52 35 kg/m²          Physical Exam  Constitutional:       Appearance: She is well-developed  HENT:      Head: Normocephalic  Eyes:      Conjunctiva/sclera: Conjunctivae normal    Neck:      Musculoskeletal: Normal range of motion  Cardiovascular:      Rate and Rhythm: Normal rate  Pulmonary:      Effort: Pulmonary effort is normal    Skin:     General: Skin is warm and dry     Neurological:      Mental Status: She is alert and oriented to person, place, and time  Patient ambulates without assistance  Tender to palpation: none   Modified straight leg raise negative bilaterally   Strength L2-S1 5/5 bilaterally   Sensation L2-S1 intact bilaterally     Imaging:  Lumbar MRI of 12/17/2020  Radiologist IMPRESSION:  Advanced spondylotic changes of the lumbar spine including severe central canal narrowing at the L4-5 level secondary to a large disc bulge with prominent facet hypertrophy/ligamentum flavum infolding      Scribe Attestation    I,:  Ambrose Reaves am acting as a scribe while in the presence of the attending physician :       I,:  Quyen Dumont MD personally performed the services described in this documentation    as scribed in my presence :

## 2021-05-26 ENCOUNTER — HOSPITAL ENCOUNTER (OUTPATIENT)
Dept: RADIOLOGY | Facility: CLINIC | Age: 61
Discharge: HOME | End: 2021-05-26
Attending: PHYSICAL MEDICINE & REHABILITATION | Admitting: PHYSICAL MEDICINE & REHABILITATION
Payer: COMMERCIAL

## 2021-05-26 VITALS
DIASTOLIC BLOOD PRESSURE: 85 MMHG | HEART RATE: 69 BPM | OXYGEN SATURATION: 93 % | RESPIRATION RATE: 20 BRPM | SYSTOLIC BLOOD PRESSURE: 151 MMHG

## 2021-05-26 DIAGNOSIS — M51.26 LUMBAR DISC HERNIATION: ICD-10-CM

## 2021-05-26 DIAGNOSIS — M48.062 SPINAL STENOSIS OF LUMBAR REGION WITH NEUROGENIC CLAUDICATION: ICD-10-CM

## 2021-05-26 PROCEDURE — 62323 NJX INTERLAMINAR LMBR/SAC: CPT | Performed by: PHYSICAL MEDICINE & REHABILITATION

## 2021-05-26 RX ORDER — METHYLPREDNISOLONE ACETATE 80 MG/ML
80 INJECTION, SUSPENSION INTRA-ARTICULAR; INTRALESIONAL; INTRAMUSCULAR; PARENTERAL; SOFT TISSUE ONCE
Status: COMPLETED | OUTPATIENT
Start: 2021-05-26 | End: 2021-05-26

## 2021-05-26 RX ADMIN — METHYLPREDNISOLONE ACETATE 80 MG: 80 INJECTION, SUSPENSION INTRA-ARTICULAR; INTRALESIONAL; INTRAMUSCULAR; PARENTERAL; SOFT TISSUE at 13:15

## 2021-05-26 RX ADMIN — IOHEXOL 1 ML: 300 INJECTION, SOLUTION INTRAVENOUS at 13:15

## 2021-05-26 NOTE — H&P
History of Present Illness:  The patient is a 61 y o  female who presents with complaints of low back pain    Patient Active Problem List   Diagnosis    Breast lump on right side at 10 o'clock position    Essential hypertension    Generalized anxiety disorder    Fibromyalgia, primary    Strain of lumbar region    Chronic bilateral low back pain without sciatica    Facet hypertrophy of lumbar region    Lumbar disc herniation    Spinal stenosis of lumbar region without neurogenic claudication       Past Medical History:   Diagnosis Date    Anxiety     Fibromyalgia     High blood pressure     Hypertension     Panic attacks        Past Surgical History:   Procedure Laterality Date    CHOLECYSTECTOMY      KNEE SURGERY      HERIBERTO-EN-Y PROCEDURE      SHOULDER OPEN ROTATOR CUFF REPAIR      SPINAL FUSION      TUBAL LIGATION           Current Outpatient Medications:     bisoprolol-hydrochlorothiazide (ZIAC) 10-6 25 MG per tablet, Take 1 tablet by mouth daily, Disp: 90 tablet, Rfl: 2    celecoxib (CeleBREX) 200 mg capsule, Take 1 capsule (200 mg total) by mouth daily, Disp: 30 capsule, Rfl: 1    diclofenac sodium (VOLTAREN) 1 %, Apply 2 g topically 3 (three) times a day as needed (Pain), Disp: 100 g, Rfl: 1    Diclofenac Sodium (VOLTAREN) 1 %, APPLY 2 G TOPICALLY 3 (THREE) TIMES A DAY AS NEEDED (PAIN), Disp: , Rfl:     DULoxetine (CYMBALTA) 60 mg delayed release capsule, Take 1 capsule (60 mg total) by mouth daily, Disp: 90 capsule, Rfl: 2    ergocalciferol (ERGOCALCIFEROL) 1 25 MG (33966 UT) capsule, Take 1 capsule (50,000 Units total) by mouth once a week, Disp: 12 capsule, Rfl: 0    gabapentin (NEURONTIN) 600 MG tablet, Take 1 tablet (600 mg total) by mouth 3 (three) times a day, Disp: 90 tablet, Rfl: 2    LORazepam (ATIVAN) 0 5 mg tablet, Take 1 tablet (0 5 mg total) by mouth 2 (two) times a day, Disp: 60 tablet, Rfl: 0    methylPREDNISolone 4 MG tablet therapy pack, Use as directed on package, Disp: 1 each, Rfl: 0    Current Facility-Administered Medications:     iohexol (OMNIPAQUE) 300 mg/mL injection 50 mL, 50 mL, Epidural, Once, Darrold Cole, DO    methylPREDNISolone acetate (DEPO-MEDROL) injection 80 mg, 80 mg, Epidural, Once, Darrold Cole, DO    No Known Allergies    Physical Exam:   Vitals:    05/26/21 1301   Pulse: 74   Resp: 20   SpO2: 96%     General: Awake, Alert, Oriented x 3, Mood and affect appropriate  Respiratory: Respirations even and unlabored  Cardiovascular: Peripheral pulses intact; no edema  Musculoskeletal Exam:  Tenderness to palpation bilateral lumbar paraspinals    ASA Score: 2  Patient has been made explicitly aware that the injection will consist of steroid which may cause a temporary suppression in immune system activity  This, in turn, may increase the patient's risk of leslee corona virus  He was advised to continue with social distancing and self quarantine  Patient wishes to proceed with the injection    Patient/Chart Verification  Patient ID Verified: Verbal  ID Band Applied: Yes  Consents Confirmed: Procedural, To be obtained in the Pre-Procedure area  H&P( within 30 days) Verified: To be obtained in the Pre-Procedure area  Allergies Reviewed: Yes  Anticoag/NSAID held?: NA(denies taking celebrex)  Currently on antibiotics?: No  Pregnancy denied?: NA    Assessment:   1  Lumbar disc herniation    2   Spinal stenosis of lumbar region with neurogenic claudication        Plan: LESI (L4-L5)

## 2021-05-26 NOTE — DISCHARGE INSTRUCTIONS
Epidural Steroid Injection   WHAT YOU NEED TO KNOW:   An epidural steroid injection (GERMAINE) is a procedure to inject steroid medicine into the epidural space  The epidural space is between your spinal cord and vertebrae  Steroids reduce inflammation and fluid buildup in your spine that may be causing pain  You may be given pain medicine along with the steroids  ACTIVITY  · Do not drive or operate machinery today  · No strenuous activity today - bending, lifting, etc   · You may resume normal activites starting tomorrow - start slowly and as tolerated  · You may shower today, but no tub baths or hot tubs  · You may have numbness for several hours from the local anesthetic  Please use caution and common sense, especially with weight-bearing activities  CARE OF THE INJECTION SITE  · If you have soreness or pain, apply ice to the area today (20 minutes on/20 minutes off)  · Starting tomorrow, you may use warm, moist heat or ice if needed  · You may have an increase or change in your discomfort for 36-48 hours after your treatment  · Apply ice and continue with any pain medication you have been prescribed  · [facility] if you have any of the following: redness, drainage, swelling, headache, stiff neck or fever above 100°F     SPECIAL INSTRUCTIONS  · Our office will contact you in approximately 7 days for a progress report  MEDICATIONS  · Continue to take all routine medications  · Our office may have instructed you to hold some medications  As no general anesthesia was used in today's procedure, you should not experience any side effects related to anesthesia  If you have a problem specifically related to your procedure, please call our office at [phone]  Problems not related to your procedure should be directed to your primary care physician

## 2021-06-02 ENCOUNTER — TELEPHONE (OUTPATIENT)
Dept: PAIN MEDICINE | Facility: CLINIC | Age: 61
End: 2021-06-02

## 2021-06-04 NOTE — TELEPHONE ENCOUNTER
S/w pt, she reports 50% improvement and current pain level 4/10  Pt said she is very pleased with the outcome this time and will f/u PRN

## 2021-06-22 ENCOUNTER — TELEPHONE (OUTPATIENT)
Dept: PAIN MEDICINE | Facility: MEDICAL CENTER | Age: 61
End: 2021-06-22

## 2021-07-02 ENCOUNTER — TELEPHONE (OUTPATIENT)
Dept: PAIN MEDICINE | Facility: CLINIC | Age: 61
End: 2021-07-02

## 2021-07-02 DIAGNOSIS — M54.50 ACUTE EXACERBATION OF CHRONIC LOW BACK PAIN: Primary | ICD-10-CM

## 2021-07-02 DIAGNOSIS — G89.29 ACUTE EXACERBATION OF CHRONIC LOW BACK PAIN: Primary | ICD-10-CM

## 2021-07-02 RX ORDER — METHYLPREDNISOLONE 4 MG/1
TABLET ORAL
Qty: 1 EACH | Refills: 0 | Status: SHIPPED | OUTPATIENT
Start: 2021-07-02 | End: 2021-07-27

## 2021-07-02 NOTE — TELEPHONE ENCOUNTER
RN s/w pt regarding previous  Per pt she last had an LESI at the end of may and was doing well for a short time  At present her low back left side 8/10 pain by the end of the day 10/10 taking Cymbalta 60mg daily, gabapentin 600mg TID, 800mg ibuprofen BID (does not take celebrex anylonger due to not helping)  Tylneol 1000mg up to TID alond with heat ice and rest and topical medications  Per pt she has tried all other nsaids without relief noted the 800mg ivuprofen takes the edge off for a short while but she is really having a hard time this week  Pt did have a solumedrol dose jude in the past and it does work for a short while as well  Aware that if this is what  orders she cannot take the ibuprofen until dose jude completed  Pt would need it sent to the [facility] on file not [facility]  ---please advise thank you--   Solumedrol Dose jude?

## 2021-07-02 NOTE — TELEPHONE ENCOUNTER
Pt aware of same and would like to be seen in f/u to discuss a new plan for her ongoing pain  Pt will check in on 7/6 to make an appt for same

## 2021-07-02 NOTE — TELEPHONE ENCOUNTER
Please let the patient know that I sent a Medrol Dosepak to the pharmacy on file and please remind her that she does need to stop taking ibuprofen while she is taking steroid  Please review side effects with patient as well  Thank you

## 2021-07-14 NOTE — TELEPHONE ENCOUNTER
Pt called in to schedule another injection because she is still pain  Please be advise thank you        Please call patient back @ [phone]

## 2021-07-14 NOTE — TELEPHONE ENCOUNTER
S/w pt, requesting repeat LESI  Pt had LESI on 5/26, reports approx 2 weeks relief from this procedure, states the pain she is experiencing is the same as what she'd had prior to this procedure  Pt states steroid pack took the edge off but did not provide any significant relief  Please advise, thank you

## 2021-07-15 NOTE — TELEPHONE ENCOUNTER
Scheduled pt for LESI for 7/26/21  Pt denies rx blood thinners  Went over pre-procedure instructions below:  Nothing to eat or drink 1 hr prior to procedure  Need to arrange transportation  Proper clothing for procedure  If ill or placed on antibiotics please call to reschedule  Covid/travel/ and vaccine instructions    Pt had approx 60% pain relief following last LESI on 5/26/21, pts current pain level is at a 9   Pt continues to do her home exercise program everyday since last injection

## 2021-07-17 DIAGNOSIS — I10 ESSENTIAL HYPERTENSION: ICD-10-CM

## 2021-07-18 RX ORDER — BISOPROLOL FUMARATE AND HYDROCHLOROTHIAZIDE 10; 6.25 MG/1; MG/1
TABLET ORAL
Qty: 90 TABLET | Refills: 0 | Status: SHIPPED | OUTPATIENT
Start: 2021-07-18 | End: 2021-09-27

## 2021-07-21 PROBLEM — Z00.00 ANNUAL PHYSICAL EXAM: Status: ACTIVE | Noted: 2021-07-21

## 2021-07-21 PROBLEM — E66.01 CLASS 3 SEVERE OBESITY DUE TO EXCESS CALORIES WITH SERIOUS COMORBIDITY AND BODY MASS INDEX (BMI) OF 50.0 TO 59.9 IN ADULT (HCC): Status: ACTIVE | Noted: 2021-07-21

## 2021-07-21 PROBLEM — E55.9 VITAMIN D DEFICIENCY: Status: ACTIVE | Noted: 2021-07-21

## 2021-07-21 PROBLEM — R73.03 PREDIABETES: Status: ACTIVE | Noted: 2021-07-21

## 2021-07-21 PROBLEM — E66.813 CLASS 3 SEVERE OBESITY DUE TO EXCESS CALORIES WITH SERIOUS COMORBIDITY AND BODY MASS INDEX (BMI) OF 50.0 TO 59.9 IN ADULT (HCC): Status: ACTIVE | Noted: 2021-07-21

## 2021-07-21 NOTE — PROGRESS NOTES
Assessment/Plan:         Problem List Items Addressed This Visit        Cardiovascular and Mediastinum    Essential hypertension - Primary     Well controlled            Musculoskeletal and Integument    Fibromyalgia, primary     On cymbalta              Other    Generalized anxiety disorder     Refill meds         Relevant Medications    LORazepam (ATIVAN) 0 5 mg tablet    Chronic bilateral low back pain without sciatica     On gabapentin surgery not an option due to her BMI         Relevant Medications    cyclobenzaprine (FLEXERIL) 10 mg tablet    Annual physical exam     Order routine labs         Vitamin D deficiency     Due for level         Relevant Orders    Vitamin D 25 hydroxy    Class 3 severe obesity due to excess calories with serious comorbidity and body mass index (BMI) of 50 0 to 59 9 in adult [facility])     Discussion on diet and exercise         Prediabetes     Check hGa1c         Relevant Orders    Hemoglobin A1C      Other Visit Diagnoses     Need for hepatitis C screening test        Relevant Orders    Hepatitis C Antibody ([facility])    Anxiety        Relevant Medications    LORazepam (ATIVAN) 0 5 mg tablet    Encounter for screening mammogram for breast cancer        Relevant Orders    Mammo screening bilateral w 3d & cad            Isubjective; pt here for annual physical and interval visit HTN prediabetes fibromyalgia chronic back pain     Patient ID: Navdeep Ayala is a 64 y o  female  HPI    The following portions of the patient's history were reviewed and updated as appropriate:   Past Medical History:  She has a past medical history of Anxiety, Fibromyalgia, High blood pressure, Hypertension, and Panic attacks  ,  _______________________________________________________________________  Medical Problems:  does not have any pertinent problems on file ,  _______________________________________________________________________  Past Surgical History:   has a past surgical history that includes Tubal ligation; Shoulder open rotator cuff repair; Cholecystectomy; Knee surgery; Spinal fusion; and Yoanna-en-y procedure ,  _______________________________________________________________________  Family History:  family history includes Heart attack in her father; Hypertension in her brother and mother; No Known Problems in her maternal aunt, maternal grandmother, paternal aunt, paternal grandmother, and sister  ,  _______________________________________________________________________  Social History:   reports that she quit smoking about 4 years ago  She started smoking about 41 years ago  She has a 18 50 pack-year smoking history  She has never used smokeless tobacco  She reports current alcohol use of about 4 0 standard drinks of alcohol per week  She reports that she does not use drugs  ,  _______________________________________________________________________  Allergies:  has No Known Allergies     _______________________________________________________________________  Current Outpatient Medications   Medication Sig Dispense Refill    bisoprolol-hydrochlorothiazide (ZIAC) 10-6 25 MG per tablet TAKE ONE TABLET BY MOUTH DAILY 90 tablet 0    DULoxetine (CYMBALTA) 60 mg delayed release capsule Take 1 capsule (60 mg total) by mouth daily 90 capsule 0    gabapentin (NEURONTIN) 600 MG tablet Take 1 tablet (600 mg total) by mouth 3 (three) times a day 90 tablet 2    LORazepam (ATIVAN) 0 5 mg tablet Take 1 tablet (0 5 mg total) by mouth 2 (two) times a day 60 tablet 0    cyclobenzaprine (FLEXERIL) 10 mg tablet Take 1 tablet (10 mg total) by mouth daily at bedtime 10 tablet 0     No current facility-administered medications for this visit      _______________________________________________________________________  Review of Systems   Constitutional: Negative for appetite change, chills, fatigue and fever  Respiratory: Negative for cough, chest tightness and shortness of breath      Cardiovascular: Negative for chest pain, palpitations and leg swelling  Gastrointestinal: Negative for abdominal pain, constipation, diarrhea, nausea and vomiting  Genitourinary: Negative for difficulty urinating and frequency  Musculoskeletal: Positive for back pain  Negative for arthralgias and neck pain  Skin: Negative for rash  Neurological: Negative for dizziness, weakness, light-headedness, numbness and headaches  Hematological: Does not bruise/bleed easily  Psychiatric/Behavioral: Negative for dysphoric mood and sleep disturbance  The patient is not nervous/anxious  Objective:  Vitals:    07/27/21 1003   BP: 138/80   BP Location: Left arm   Patient Position: Sitting   Pulse: 72   Resp: 18   Temp: 97 8 °F (36 6 °C)   SpO2: 96%   Weight: (!) 140 kg (309 lb)   Height: 5' 4" (1 626 m)     Body mass index is 53 04 kg/m²  Physical Exam  Vitals reviewed  Constitutional:       General: She is not in acute distress  Appearance: Normal appearance  She is well-developed  She is obese  HENT:      Head: Normocephalic  Right Ear: Tympanic membrane, ear canal and external ear normal       Left Ear: Tympanic membrane, ear canal and external ear normal       Nose: Nose normal       Mouth/Throat:      Pharynx: No oropharyngeal exudate  Eyes:      General: Lids are normal       Extraocular Movements: Extraocular movements intact  Conjunctiva/sclera: Conjunctivae normal       Pupils: Pupils are equal, round, and reactive to light  Neck:      Thyroid: No thyromegaly  Vascular: No carotid bruit  Cardiovascular:      Rate and Rhythm: Normal rate and regular rhythm  Pulses: Normal pulses  Heart sounds: Normal heart sounds  No murmur heard  No friction rub  Pulmonary:      Effort: Pulmonary effort is normal  No respiratory distress  Breath sounds: Normal breath sounds  No stridor  No wheezing or rales     Chest:      Breasts: Breasts are symmetrical          Right: Normal  No swelling, bleeding, inverted nipple, mass, nipple discharge, skin change or tenderness  Left: Normal  No swelling, bleeding, inverted nipple, mass, nipple discharge, skin change or tenderness  Abdominal:      General: Bowel sounds are normal  There is no distension  Palpations: Abdomen is soft  There is no mass  Tenderness: There is no abdominal tenderness  There is no guarding  Hernia: No hernia is present  Musculoskeletal:         General: Normal range of motion  Cervical back: Full passive range of motion without pain, normal range of motion and neck supple  Lymphadenopathy:      Cervical: No cervical adenopathy  Skin:     General: Skin is warm and dry  Findings: No rash  Comments: Nl appearing moles   Neurological:      General: No focal deficit present  Mental Status: She is alert and oriented to person, place, and time  Mental status is at baseline  Cranial Nerves: No cranial nerve deficit  Sensory: No sensory deficit  Motor: No abnormal muscle tone  Coordination: Coordination normal       Gait: Gait normal       Deep Tendon Reflexes: Reflexes normal  Babinski sign absent on the right side  Psychiatric:         Mood and Affect: Mood normal          Speech: Speech normal          Behavior: Behavior normal          Thought Content: Thought content normal          Judgment: Judgment normal        BMI Counseling: Body mass index is 53 04 kg/m²  The BMI is above normal  Nutrition recommendations include 3-5 servings of fruits/vegetables daily, reducing fast food intake, consuming healthier snacks, decreasing soda and/or juice intake, moderation in carbohydrate intake and increasing intake of lean protein  Exercise recommendations include exercising 3-5 times per week and strength training exercises

## 2021-07-26 ENCOUNTER — HOSPITAL ENCOUNTER (OUTPATIENT)
Dept: RADIOLOGY | Facility: CLINIC | Age: 61
Discharge: HOME | End: 2021-07-26
Attending: PHYSICAL MEDICINE & REHABILITATION | Admitting: PHYSICAL MEDICINE & REHABILITATION
Payer: COMMERCIAL

## 2021-07-26 VITALS
OXYGEN SATURATION: 97 % | TEMPERATURE: 98.6 F | RESPIRATION RATE: 18 BRPM | SYSTOLIC BLOOD PRESSURE: 125 MMHG | HEART RATE: 66 BPM | DIASTOLIC BLOOD PRESSURE: 64 MMHG

## 2021-07-26 DIAGNOSIS — M51.26 DISPLACEMENT OF LUMBAR INTERVERTEBRAL DISC: ICD-10-CM

## 2021-07-26 PROCEDURE — 62323 NJX INTERLAMINAR LMBR/SAC: CPT | Performed by: PHYSICAL MEDICINE & REHABILITATION

## 2021-07-26 RX ORDER — METHYLPREDNISOLONE ACETATE 80 MG/ML
80 INJECTION, SUSPENSION INTRA-ARTICULAR; INTRALESIONAL; INTRAMUSCULAR; PARENTERAL; SOFT TISSUE ONCE
Status: COMPLETED | OUTPATIENT
Start: 2021-07-26 | End: 2021-07-26

## 2021-07-26 RX ADMIN — IOHEXOL 1 ML: 300 INJECTION, SOLUTION INTRAVENOUS at 15:04

## 2021-07-26 RX ADMIN — METHYLPREDNISOLONE ACETATE 80 MG: 80 INJECTION, SUSPENSION INTRA-ARTICULAR; INTRALESIONAL; INTRAMUSCULAR; PARENTERAL; SOFT TISSUE at 15:04

## 2021-07-26 NOTE — DISCHARGE INSTR - LAB
Epidural Steroid Injection   WHAT YOU NEED TO KNOW:   An epidural steroid injection (GERMAINE) is a procedure to inject steroid medicine into the epidural space  The epidural space is between your spinal cord and vertebrae  Steroids reduce inflammation and fluid buildup in your spine that may be causing pain  You may be given pain medicine along with the steroids  ACTIVITY  · Do not drive or operate machinery today  · No strenuous activity today - bending, lifting, etc   · You may resume normal activites starting tomorrow - start slowly and as tolerated  · You may shower today, but no tub baths or hot tubs  · You may have numbness for several hours from the local anesthetic  Please use caution and common sense, especially with weight-bearing activities  CARE OF THE INJECTION SITE  · If you have soreness or pain, apply ice to the area today (20 minutes on/20 minutes off)  · Starting tomorrow, you may use warm, moist heat or ice if needed  · You may have an increase or change in your discomfort for 36-48 hours after your treatment  · Apply ice and continue with any pain medication you have been prescribed  · [facility]ify [facility] if you have any of the following: redness, drainage, swelling, headache, stiff neck or fever above 100°F     SPECIAL INSTRUCTIONS  · Our office will contact you in approximately 7 days for a progress report  MEDICATIONS  · Continue to take all routine medications  · Our office may have instructed you to hold some medications  As no general anesthesia was used in today's procedure, you should not experience any side effects related to anesthesia  If you have a problem specifically related to your procedure, please call our office at [phone]  Problems not related to your procedure should be directed to your primary care physician

## 2021-07-26 NOTE — H&P
History of Present Illness:  The patient is a 64 y o  female who presents with complaints of low back pain    Patient Active Problem List   Diagnosis    Breast lump on right side at 10 o'clock position    Essential hypertension    Generalized anxiety disorder    Fibromyalgia, primary    Strain of lumbar region    Chronic bilateral low back pain without sciatica    Facet hypertrophy of lumbar region    Lumbar disc herniation    Spinal stenosis of lumbar region with neurogenic claudication    Annual physical exam    Vitamin D deficiency    Class 3 severe obesity due to excess calories with serious comorbidity and body mass index (BMI) of 50 0 to 59 9 in adult ([facility] [address] )    Prediabetes       Past Medical History:   Diagnosis Date    Anxiety     Fibromyalgia     High blood pressure     Hypertension     Panic attacks        Past Surgical History:   Procedure Laterality Date    CHOLECYSTECTOMY      KNEE SURGERY      HERIBERTO-EN-Y PROCEDURE      SHOULDER OPEN ROTATOR CUFF REPAIR      SPINAL FUSION      TUBAL LIGATION           Current Outpatient Medications:     bisoprolol-hydrochlorothiazide (ZIAC) 10-6 25 MG per tablet, TAKE ONE TABLET BY MOUTH DAILY, Disp: 90 tablet, Rfl: 0    DULoxetine (CYMBALTA) 60 mg delayed release capsule, Take 1 capsule (60 mg total) by mouth daily, Disp: 90 capsule, Rfl: 0    ergocalciferol (ERGOCALCIFEROL) 1 25 MG (78007 UT) capsule, Take 1 capsule (50,000 Units total) by mouth once a week, Disp: 12 capsule, Rfl: 0    gabapentin (NEURONTIN) 600 MG tablet, Take 1 tablet (600 mg total) by mouth 3 (three) times a day, Disp: 90 tablet, Rfl: 2    LORazepam (ATIVAN) 0 5 mg tablet, Take 1 tablet (0 5 mg total) by mouth 2 (two) times a day, Disp: 60 tablet, Rfl: 0    methylPREDNISolone 4 MG tablet therapy pack, Use as directed on package, Disp: 1 each, Rfl: 0    Current Facility-Administered Medications:     iohexol (OMNIPAQUE) 300 mg/mL injection 50 mL, 50 mL, Epidural, Once, Tilman Large Nelida Phelps, DO    methylPREDNISolone acetate (DEPO-MEDROL) injection 80 mg, 80 mg, Epidural, Once, Mary Orf, DO    No Known Allergies    Physical Exam:   Vitals:    07/26/21 1441   BP: 109/57   Pulse: 77   Resp: 18   Temp: 98 6 °F (37 °C)   SpO2: 97%     General: Awake, Alert, Oriented x 3, Mood and affect appropriate  Respiratory: Respirations even and unlabored  Cardiovascular: Peripheral pulses intact; no edema  Musculoskeletal Exam:  Tenderness to palpation bilateral lumbar paraspinals    ASA Score: 2  Patient has been made explicitly aware that the injection will consist of steroid which may cause a temporary suppression in immune system activity  This, in turn, may increase the patient's risk of leslee corona virus  He was advised to continue with social distancing and self quarantine  Patient wishes to proceed with the injection    Patient/Chart Verification  Patient ID Verified: Verbal  ID Band Applied: No  Consents Confirmed: Procedural, To be obtained in the Pre-Procedure area  H&P( within 30 days) Verified: To be obtained in the Pre-Procedure area  Allergies Reviewed: Yes  Anticoag/NSAID held?: No  Currently on antibiotics?: No    Assessment:   1   Displacement of lumbar intervertebral disc        Plan: BANDAR

## 2021-07-27 ENCOUNTER — OFFICE VISIT (OUTPATIENT)
Dept: FAMILY MEDICINE CLINIC | Facility: CLINIC | Age: 61
End: 2021-07-27
Payer: COMMERCIAL

## 2021-07-27 VITALS
BODY MASS INDEX: 50.02 KG/M2 | SYSTOLIC BLOOD PRESSURE: 138 MMHG | WEIGHT: 293 LBS | TEMPERATURE: 97.8 F | OXYGEN SATURATION: 96 % | DIASTOLIC BLOOD PRESSURE: 80 MMHG | HEIGHT: 64 IN | HEART RATE: 72 BPM | RESPIRATION RATE: 18 BRPM

## 2021-07-27 DIAGNOSIS — I10 ESSENTIAL HYPERTENSION: Primary | ICD-10-CM

## 2021-07-27 DIAGNOSIS — R73.03 PREDIABETES: ICD-10-CM

## 2021-07-27 DIAGNOSIS — Z00.00 ANNUAL PHYSICAL EXAM: ICD-10-CM

## 2021-07-27 DIAGNOSIS — M54.50 CHRONIC BILATERAL LOW BACK PAIN WITHOUT SCIATICA: ICD-10-CM

## 2021-07-27 DIAGNOSIS — Z11.59 NEED FOR HEPATITIS C SCREENING TEST: ICD-10-CM

## 2021-07-27 DIAGNOSIS — F41.9 ANXIETY: ICD-10-CM

## 2021-07-27 DIAGNOSIS — E55.9 VITAMIN D DEFICIENCY: ICD-10-CM

## 2021-07-27 DIAGNOSIS — E66.01 CLASS 3 SEVERE OBESITY DUE TO EXCESS CALORIES WITH SERIOUS COMORBIDITY AND BODY MASS INDEX (BMI) OF 50.0 TO 59.9 IN ADULT (HCC): ICD-10-CM

## 2021-07-27 DIAGNOSIS — M79.7 FIBROMYALGIA, PRIMARY: ICD-10-CM

## 2021-07-27 DIAGNOSIS — G89.29 CHRONIC BILATERAL LOW BACK PAIN WITHOUT SCIATICA: ICD-10-CM

## 2021-07-27 DIAGNOSIS — F41.1 GENERALIZED ANXIETY DISORDER: ICD-10-CM

## 2021-07-27 DIAGNOSIS — Z12.31 ENCOUNTER FOR SCREENING MAMMOGRAM FOR BREAST CANCER: ICD-10-CM

## 2021-07-27 PROBLEM — S39.012A STRAIN OF LUMBAR REGION: Status: RESOLVED | Noted: 2020-10-16 | Resolved: 2021-07-27

## 2021-07-27 PROCEDURE — 99396 PREV VISIT EST AGE 40-64: CPT | Performed by: FAMILY MEDICINE

## 2021-07-27 RX ORDER — LORAZEPAM 0.5 MG/1
0.5 TABLET ORAL 2 TIMES DAILY
Qty: 60 TABLET | Refills: 0 | Status: SHIPPED | OUTPATIENT
Start: 2021-07-27 | End: 2022-01-10 | Stop reason: SDUPTHER

## 2021-07-27 RX ORDER — CYCLOBENZAPRINE HCL 10 MG
10 TABLET ORAL
Qty: 10 TABLET | Refills: 0 | Status: SHIPPED | OUTPATIENT
Start: 2021-07-27 | End: 2022-01-10

## 2021-08-02 ENCOUNTER — TELEPHONE (OUTPATIENT)
Dept: PAIN MEDICINE | Facility: CLINIC | Age: 61
End: 2021-08-02

## 2021-08-02 ENCOUNTER — OFFICE VISIT (OUTPATIENT)
Dept: OBGYN CLINIC | Facility: CLINIC | Age: 61
End: 2021-08-02
Payer: COMMERCIAL

## 2021-08-02 VITALS
WEIGHT: 293 LBS | SYSTOLIC BLOOD PRESSURE: 103 MMHG | BODY MASS INDEX: 50.02 KG/M2 | HEART RATE: 79 BPM | DIASTOLIC BLOOD PRESSURE: 71 MMHG | HEIGHT: 64 IN

## 2021-08-02 DIAGNOSIS — M54.41 CHRONIC BILATERAL LOW BACK PAIN WITH BILATERAL SCIATICA: ICD-10-CM

## 2021-08-02 DIAGNOSIS — M54.42 CHRONIC BILATERAL LOW BACK PAIN WITH BILATERAL SCIATICA: ICD-10-CM

## 2021-08-02 DIAGNOSIS — G89.29 CHRONIC BILATERAL LOW BACK PAIN WITH BILATERAL SCIATICA: ICD-10-CM

## 2021-08-02 DIAGNOSIS — M48.062 SPINAL STENOSIS OF LUMBAR REGION WITH NEUROGENIC CLAUDICATION: Primary | ICD-10-CM

## 2021-08-02 DIAGNOSIS — M54.16 LUMBAR RADICULOPATHY: ICD-10-CM

## 2021-08-02 DIAGNOSIS — M43.16 SPONDYLOLISTHESIS OF LUMBAR REGION: ICD-10-CM

## 2021-08-02 PROCEDURE — 99214 OFFICE O/P EST MOD 30 MIN: CPT | Performed by: ORTHOPAEDIC SURGERY

## 2021-08-02 NOTE — PROGRESS NOTES
[address] Aden Cedeno MD  [address] [address]  [phone]    HISTORY OF PRESENT ILLNESS:  Katie Ceja is a pleasant 64year old female presents for an initial evaluation for low back pain  Patient is currently under the care of Dr Grigsby and pain management  She recently underwent an epidural steroid injection on 5/26/21 with minimal relief  Patient states Dr Grigsby is unable to provide a surgical option due to her weight  She has tried formal physical therapy, epidural steroid injection, and oral medications  She is here today for a second opinion for further treatment and surgical options  She works as a pharmacy technician  Her job involves half sitting and half standing  She does complain of increasing symptoms when she stands  She also cannot walk long distances      ALLERGIES: No Known Allergies    MEDICATIONS:    Current Outpatient Medications:     bisoprolol-hydrochlorothiazide (ZIAC) 10-6 25 MG per tablet, TAKE ONE TABLET BY MOUTH DAILY, Disp: 90 tablet, Rfl: 0    cyclobenzaprine (FLEXERIL) 10 mg tablet, Take 1 tablet (10 mg total) by mouth daily at bedtime, Disp: 10 tablet, Rfl: 0    DULoxetine (CYMBALTA) 60 mg delayed release capsule, Take 1 capsule (60 mg total) by mouth daily, Disp: 90 capsule, Rfl: 0    gabapentin (NEURONTIN) 600 MG tablet, Take 1 tablet (600 mg total) by mouth 3 (three) times a day, Disp: 90 tablet, Rfl: 2    LORazepam (ATIVAN) 0 5 mg tablet, Take 1 tablet (0 5 mg total) by mouth 2 (two) times a day, Disp: 60 tablet, Rfl: 0     PAST MEDICAL HISTORY:   Past Medical History:   Diagnosis Date    Anxiety     Fibromyalgia     High blood pressure     Hypertension     Panic attacks        PAST SURGICAL HISTORY:  Past Surgical History:   Procedure Laterality Date    CHOLECYSTECTOMY      KNEE SURGERY      HERIBERTO-EN-Y PROCEDURE      SHOULDER OPEN ROTATOR CUFF REPAIR      SPINAL FUSION      TUBAL LIGATION         SOCIAL HISTORY:  Social History     Tobacco Use   Smoking Status Former Smoker    Packs/day: 0 50    Years: 37 00    Pack years: 18 50    Start date: 36    Quit date: 2017    Years since quittin 1   Smokeless Tobacco Never Used        ROS:  Review of Systems   Constitutional: Negative for chills, fever and unexpected weight change  HENT: Negative for hearing loss, nosebleeds and sore throat  Eyes: Negative for pain, redness and visual disturbance  Respiratory: Negative for cough, shortness of breath and wheezing  Cardiovascular: Negative for chest pain, palpitations and leg swelling  Gastrointestinal: Negative for abdominal pain, constipation, nausea and vomiting  Endocrine: Negative for polydipsia and polyuria  Genitourinary: Negative for decreased urine volume, dysuria, hematuria and urgency  Musculoskeletal: Positive for arthralgias, back pain and gait problem  Skin: Negative for rash and wound  Neurological: Positive for weakness and numbness  Negative for dizziness and headaches  Psychiatric/Behavioral: Negative for decreased concentration, dysphoric mood and suicidal ideas  The patient is not nervous/anxious  PHYSICAL EXAM:  64year old female significant overweight  Able to stand erect  Sagittal coronal balance were within normal limits  Her gait was within normal limits  5/5 motor function and normal sensation was present lower extremities  Straight leg raise was negative  There was also deformity atrophy bilateral lower extremities  There was also discomfort to palpation lumbosacral junction  Muscle spasms were not felt  No significant deformity was present  RADIOGRAPHIC STUDIES:  1  MRI  L-spine 20 demonstrate evidence of multilevel lumbar degenerative disc disease  There was evidence of facet hypertrophy at L4-5  Mild lateral recess stenosis was present at L3-4 bilaterally and on the right side at L5-S1    Severe central lateral recess stenosis was present at L4-5  2 X-ray L-spine 10/16/20 demonstrated multilevel lumbar degenerative disc disease  Into spondylolisthesis was present at L4-5  Was also has a facet disease  There is no evidence of scoliosis  ASSESSMENT:  1  Spinal stenosis of lumbar region with neurogenic claudication  -     Ambulatory referral to Physical Therapy; Future    2  Chronic bilateral low back pain with bilateral sciatica    3  Lumbar radiculopathy  -     Ambulatory referral to Physical Therapy; Future    4  Spondylolisthesis of lumbar region  -     Ambulatory referral to Physical Therapy; Future      PLAN: Kirby Oakley is a pleasant 64year old female with chronic low back pain  Her symptoms appear to be due to spinal stenosis  She does have some complaints suggestive of neurological claudication  The back pain however is most significant issue  She does also suffers from multilevel lumbar degenerative disc disease and is significantly overweight  She has had a prior gastric bypass procedure is not a can for additional treatment  She reported that she has always had issues with weight and does not have any other options available  Natural history of spinal stenosis, spondylolisthesis and neurological occasion was discussed with the patient  At this time I am not sure if the symptoms are all due to spinal stenosis  I did discuss the treatment option  She has never had water therapy  I did order a short course to aqua therapy to see if she has any significant improvement  If the symptoms improve with water therapy then she will continue with water therapy  If all fails the nerve functional deficit increases, surgery will be the only option  Unfortunate the surgery will not be indicated for back pain given her weight  She is under care pain management  She has had 3 injections  At this time additional injections are not indicated  A spinal cord stimulator has been discussed with the patient    Given the mechanical aspect of the problem, I do not believe the spinal cord stimulator will be successful  I am going to see her back in 2 months for evaluation of her condition  At that time we will assess if the therapy has been helpful           Scribe Attestation    I,:  Mike Gibson MA am acting as a scribe while in the presence of the attending physician :       I,:  Kathleen Modi MD personally performed the services described in this documentation    as scribed in my presence :

## 2021-08-10 NOTE — TELEPHONE ENCOUNTER
Called left mess for pt to call back to schedule a follow up with DIVINA Phipps or Dhiraj Montemayor per Dr Elisabeth Vela request

## 2021-08-17 ENCOUNTER — EVALUATION (OUTPATIENT)
Dept: PHYSICAL THERAPY | Age: 61
End: 2021-08-17
Payer: COMMERCIAL

## 2021-08-17 DIAGNOSIS — M48.062 SPINAL STENOSIS OF LUMBAR REGION WITH NEUROGENIC CLAUDICATION: ICD-10-CM

## 2021-08-17 DIAGNOSIS — M54.16 LUMBAR RADICULOPATHY: ICD-10-CM

## 2021-08-17 DIAGNOSIS — M43.16 SPONDYLOLISTHESIS OF LUMBAR REGION: ICD-10-CM

## 2021-08-17 PROCEDURE — 97161 PT EVAL LOW COMPLEX 20 MIN: CPT | Performed by: PHYSICAL THERAPIST

## 2021-08-17 NOTE — PROGRESS NOTES
PT Evaluation     Today's date: 2021  Patient name: Callie Marmolejo  : 1960  MRN: [id number]  Referring provider: Jez Mueller MD  Dx:   Encounter Diagnosis     ICD-10-CM    1  Spinal stenosis of lumbar region with neurogenic claudication  M48 [id number] Ambulatory referral to Physical Therapy   2  Lumbar radiculopathy  M54 16 Ambulatory referral to Physical Therapy   3  Spondylolisthesis of lumbar region  M43 16 Ambulatory referral to Physical Therapy                  Assessment  Assessment details: Pt reports to PT with cc of lumbar pain that has been present for 1 year  Pt has pain consistent with flexion bias with underlying chronic pain  Pt will trial aquatic therapy at this time  Impairments: abnormal coordination, abnormal muscle firing, abnormal muscle tone, abnormal or restricted ROM, abnormal movement, impaired physical strength, lacks appropriate home exercise program and weight-bearing intolerance    Goals  In 4 weeks pt will:  -Be independent with phase I of HEP  -Increase LE strength by 1/2 grade  -Increase Lumbar ROM by 25%    By discharge pt will:  -Be independent with Phase II of HEP  -Demonstrate full LE strength  -Demonstrate full Lumbar ROM  -Report minimal pain with ADLs    Plan  Patient would benefit from: skilled physical therapy  Planned therapy interventions: joint mobilization, manual therapy, motor coordination training, muscle pump exercises, neuromuscular re-education, balance/weight bearing training, patient education, therapeutic activities, therapeutic exercise, functional ROM exercises and home exercise program  Frequency: 2x week  Duration in weeks: 6  Plan of Care beginning date: 2021  Plan of Care expiration date: 2021  Treatment plan discussed with: patient and PTA        Subjective Evaluation    History of Present Illness  Mechanism of injury: Pt reports to PT with cc of lumbar pain that has been present for 1 year   Pt reports 3x injections that provided minimal relief  Pt is open to aquatic therapy but skeptical it will provide any benefit           Objective     Active Range of Motion     Additional Active Range of Motion Details  Lumbar ROM as % of normal ROM    Flex:75  Ext:25  R ROT:50  L ROT:50      Strength/Myotome Testing     Left Hip   Planes of Motion   Abduction: 3    Right Hip   Planes of Motion   Abduction: 3    Left Knee   Extension: 3+    Right Knee   Extension: 3+    Left Ankle/Foot   Dorsiflexion: 3+  Plantar flexion: 3+    Right Ankle/Foot   Dorsiflexion: 3+  Plantar flexion: 3+    Tests     Lumbar     Left   Positive slump test      Right   Negative slump test               Precautions: Fibromyalgia      Manuals                                                                 Neuro Re-Ed                                                                                                        Ther Ex                                                                                                                     Ther Activity                                       Gait Training                                       Modalities

## 2021-08-20 ENCOUNTER — APPOINTMENT (OUTPATIENT)
Dept: LAB | Facility: HOSPITAL | Age: 61
End: 2021-08-20
Attending: FAMILY MEDICINE
Payer: COMMERCIAL

## 2021-08-20 DIAGNOSIS — Z11.59 NEED FOR HEPATITIS C SCREENING TEST: ICD-10-CM

## 2021-08-20 DIAGNOSIS — R73.03 PREDIABETES: ICD-10-CM

## 2021-08-20 DIAGNOSIS — E55.9 VITAMIN D DEFICIENCY: ICD-10-CM

## 2021-08-20 LAB
25(OH)D3 SERPL-MCNC: 21.1 NG/ML (ref 30–100)
EST. AVERAGE GLUCOSE BLD GHB EST-MCNC: 128 MG/DL
HBA1C MFR BLD: 6.1 %
HCV AB SER QL: NORMAL

## 2021-08-20 PROCEDURE — 36415 COLL VENOUS BLD VENIPUNCTURE: CPT

## 2021-08-20 PROCEDURE — 83036 HEMOGLOBIN GLYCOSYLATED A1C: CPT

## 2021-08-20 PROCEDURE — 86803 HEPATITIS C AB TEST: CPT

## 2021-08-20 PROCEDURE — 82306 VITAMIN D 25 HYDROXY: CPT

## 2021-09-01 ENCOUNTER — OFFICE VISIT (OUTPATIENT)
Dept: PAIN MEDICINE | Facility: CLINIC | Age: 61
End: 2021-09-01
Payer: COMMERCIAL

## 2021-09-01 VITALS
WEIGHT: 293 LBS | HEIGHT: 64 IN | HEART RATE: 57 BPM | RESPIRATION RATE: 18 BRPM | SYSTOLIC BLOOD PRESSURE: 132 MMHG | DIASTOLIC BLOOD PRESSURE: 54 MMHG | BODY MASS INDEX: 50.02 KG/M2

## 2021-09-01 DIAGNOSIS — M48.062 SPINAL STENOSIS OF LUMBAR REGION WITH NEUROGENIC CLAUDICATION: ICD-10-CM

## 2021-09-01 DIAGNOSIS — M51.26 DISPLACEMENT OF LUMBAR INTERVERTEBRAL DISC: ICD-10-CM

## 2021-09-01 DIAGNOSIS — M47.816 FACET HYPERTROPHY OF LUMBAR REGION: ICD-10-CM

## 2021-09-01 DIAGNOSIS — G89.29 CHRONIC BILATERAL LOW BACK PAIN WITHOUT SCIATICA: Primary | ICD-10-CM

## 2021-09-01 DIAGNOSIS — M54.50 CHRONIC BILATERAL LOW BACK PAIN WITHOUT SCIATICA: Primary | ICD-10-CM

## 2021-09-01 DIAGNOSIS — M53.3 SACROILIAC JOINT DYSFUNCTION OF BOTH SIDES: ICD-10-CM

## 2021-09-01 DIAGNOSIS — G57.02 PIRIFORMIS SYNDROME OF LEFT SIDE: ICD-10-CM

## 2021-09-01 PROCEDURE — 99214 OFFICE O/P EST MOD 30 MIN: CPT | Performed by: PHYSICAL MEDICINE & REHABILITATION

## 2021-09-01 RX ORDER — GABAPENTIN 800 MG/1
800 TABLET ORAL 3 TIMES DAILY
Qty: 90 TABLET | Refills: 2 | Status: SHIPPED | OUTPATIENT
Start: 2021-09-01 | End: 2022-02-01 | Stop reason: SDUPTHER

## 2021-09-01 NOTE — PROGRESS NOTES
Assessment:  1  Chronic bilateral low back pain without sciatica    2  Facet hypertrophy of lumbar region    3  Spinal stenosis of lumbar region with neurogenic claudication    4  Displacement of lumbar intervertebral disc    5  Sacroiliac joint dysfunction of both sides    6  Piriformis syndrome of left side        Plan:  Ms Nicolasa Zepeda is a pleasant 60-year-old female who presents for follow-up and re-evaluation regarding chronic low back pain with radiating symptoms into the bilateral lower extremities  We have now tried 3 lumbar epidural steroid injections with significant but short-lived relief in her pain  She now reports continued left-sided low back and buttock pain that is contributing to her overall pain  Unfortunately she has seen surgery twice and do not believe she is a surgical candidate due to her current BMI  At this time we will   1  Plan for left-sided SI joint and piriformis injection under fluoro guidance  2  Increase gabapentin to 800 mg 3 times a day   3  Complete risks and benefits including bleeding, infection, tissue reaction, nerve injury and allergic reaction were discussed  The approach was demonstrated using models and literature was provided  Verbal and written consent was obtained  My impressions and treatment recommendations were discussed in detail with the patient who verbalized understanding and had no further questions  Discharge instructions were provided  I personally saw and examined the patient and I agree with the above discussed plan of care  Orders Placed This Encounter   Procedures    FL spine and pain procedure     Standing Status:   Future     Standing Expiration Date:   9/1/2025     Order Specific Question:   Reason for Exam:     Answer:   Left SIJ and piriformis inj     Order Specific Question:   Anticoagulant hold needed?      Answer:   No     New Medications Ordered This Visit   Medications    gabapentin (NEURONTIN) 800 mg tablet     Sig: Take 1 tablet (800 mg total) by mouth 3 (three) times a day     Dispense:  90 tablet     Refill:  2       History of Present Illness:  Cinthya Carlin is a 64 y o  female who presents for a follow up office visit in regards to Back Pain and Leg Pain (BLE)  The patients current symptoms include low back and left-sided buttock pain currently rated 7/10 and interfering with daily activities  Pain is constant and described as a pressure-like, throbbing, burning, numbness sensation that is worse in the morning in the evening  Presents today for follow-up and re-evaluation  I have personally reviewed and/or updated the patient's past medical history, past surgical history, family history, social history, current medications, allergies, and vital signs today  Review of Systems   Respiratory: Negative for shortness of breath  Cardiovascular: Negative for chest pain  Gastrointestinal: Negative for constipation, diarrhea, nausea and vomiting  Musculoskeletal: Positive for back pain and gait problem  Negative for arthralgias, joint swelling and myalgias  BLE Pain   Skin: Negative for rash  Neurological: Negative for dizziness, seizures and weakness  All other systems reviewed and are negative        Patient Active Problem List   Diagnosis    Breast lump on right side at 10 o'clock position    Essential hypertension    Generalized anxiety disorder    Fibromyalgia, primary    Chronic bilateral low back pain without sciatica    Facet hypertrophy of lumbar region    Displacement of lumbar intervertebral disc    Spinal stenosis of lumbar region with neurogenic claudication    Annual physical exam    Vitamin D deficiency    Class 3 severe obesity due to excess calories with serious comorbidity and body mass index (BMI) of 50 0 to 59 9 in adult [facility])    Prediabetes       Past Medical History:   Diagnosis Date    Anxiety     Fibromyalgia     High blood pressure     Hypertension     Panic attacks        Past Surgical History:   Procedure Laterality Date    CHOLECYSTECTOMY      KNEE SURGERY      HERIBERTO-EN-Y PROCEDURE      SHOULDER OPEN ROTATOR CUFF REPAIR      SPINAL FUSION      TUBAL LIGATION         Family History   Problem Relation Age of Onset    Hypertension Mother     Heart attack Father         late 46s     Hypertension Brother     No Known Problems Sister     No Known Problems Maternal Grandmother     No Known Problems Paternal Grandmother     No Known Problems Maternal Aunt     No Known Problems Paternal Aunt        Social History     Occupational History    Not on file   Tobacco Use    Smoking status: Former Smoker     Packs/day: 0 50     Years: 37 00     Pack years: 18 50     Start date: 36     Quit date: 2017     Years since quittin 2    Smokeless tobacco: Never Used   Vaping Use    Vaping Use: Never used   Substance and Sexual Activity    Alcohol use: Yes     Alcohol/week: 4 0 standard drinks     Types: 1 Glasses of wine, 1 Cans of beer, 1 Shots of liquor, 1 Standard drinks or equivalent per week     Comment: 3 per week    Drug use: Never     Comment: Illicit drugs:   no  - As per Beck Boxrosa Sexual activity: Not on file       Current Outpatient Medications on File Prior to Visit   Medication Sig    bisoprolol-hydrochlorothiazide (ZIAC) 10-6 25 MG per tablet TAKE ONE TABLET BY MOUTH DAILY    cyclobenzaprine (FLEXERIL) 10 mg tablet Take 1 tablet (10 mg total) by mouth daily at bedtime    DULoxetine (CYMBALTA) 60 mg delayed release capsule Take 1 capsule (60 mg total) by mouth daily    LORazepam (ATIVAN) 0 5 mg tablet Take 1 tablet (0 5 mg total) by mouth 2 (two) times a day    [DISCONTINUED] gabapentin (NEURONTIN) 600 MG tablet Take 1 tablet (600 mg total) by mouth 3 (three) times a day     No current facility-administered medications on file prior to visit         No Known Allergies    Physical Exam:    /54   Pulse 57   Resp 18   Ht 5' 4" (1 626 m)   Wt (!) 138 kg (304 lb)   BMI 52 18 kg/m²     Constitutional:normal, well developed, well nourished, alert, in no distress and non-toxic and no overt pain behavior    Eyes:anicteric  HEENT:grossly intact  Neck:supple, symmetric, trachea midline and no masses   Pulmonary:even and unlabored  Cardiovascular:No edema or pitting edema present  Skin:Normal without rashes or lesions and well hydrated  Psychiatric:Mood and affect appropriate  Neurologic:Cranial Nerves II-XII grossly intact  Musculoskeletal:antalgic, tenderness to palpation left-sided lumbar paraspinals and distal to PSIS, decreased active and passive range of motion with lumbar flexion and extension limited by pain, MMT unchanged from previous evaluation,   POSITIVE Jenelle finger left-sided  POSITIVE Mannie's test left-sided  POSITIVE sacral compression testing left-sided    Imaging normal...

## 2021-09-01 NOTE — PATIENT INSTRUCTIONS
Sacroiliitis   WHAT YOU NEED TO KNOW:   Sacroiliitis is a painful swelling of one or both of your sacroiliac joints that lasts at least 3 months  The sacroiliac joint connects your pelvis to the base of your spine  DISCHARGE INSTRUCTIONS:   Medicines:  Ask for more information about these and other medicines you may need to treat sacroiliitis:  · Pain medicine: You may be given medicine to take away or decrease pain  Do not wait until the pain is severe before you take your medicine  You may be given the medicine as a pill to swallow or as a lotion that you put on the painful area  · NSAIDs  help decrease swelling and pain or fever  This medicine is available with or without a doctor's order  NSAIDs can cause stomach bleeding or kidney problems in certain people  If you take blood thinner medicine, always ask your healthcare provider if NSAIDs are safe for you  Always read the medicine label and follow directions  · Muscle relaxers  help decrease pain and muscle spasms  · Take your medicine as directed  Contact your healthcare provider if you think your medicine is not helping or if you have side effects  Tell him of her if you are allergic to any medicine  Keep a list of the medicines, vitamins, and herbs you take  Include the amounts, and when and why you take them  Bring the list or the pill bottles to follow-up visits  Carry your medicine list with you in case of an emergency  Physical therapy:  Your healthcare provider may suggest physical therapy  Your physical therapist may teach you exercises to improve posture (the way you stand and sit), flexibility, and strength in your lower back  He may also teach you how to remain safely active and avoid further injury  Follow the exercise plan given to you by your physical therapist   Rest:  Follow your healthcare provider's instructions about how much rest you should get  Avoid activity that worsens your pain    Ice or heat packs:  Use ice or heat packs on the sore area of your body to decrease the pain and swelling  Put ice in a plastic bag covered with a towel on your low back  Cover heated items with a towel to avoid burns  Use ice and heat as directed  Follow up with your healthcare provider or spine specialist within 1 to 2 weeks:  Write down your questions so you remember to ask them during your visits  Contact your healthcare provider or spine specialist if:   · Your pain makes it hard for you to do your daily activities, such as work or school  · Your pain does not go away after treatment  · You feel depressed or anxious  · You have questions about your condition or care  Return to the emergency department if:   · You have a fever  · Your pain is worse than before  · Your pain prevents you from sleeping  © Copyright [employer] 2021 Information is for End User's use only and may not be sold, redistributed or otherwise used for commercial purposes  All illustrations and images included in CareNotes® are the copyrighted property of A D A M , Inc  or [facility] Alexx Dyer   The above information is an  only  It is not intended as medical advice for individual conditions or treatments  Talk to your doctor, nurse or pharmacist before following any medical regimen to see if it is safe and effective for you

## 2021-09-02 ENCOUNTER — TELEPHONE (OUTPATIENT)
Dept: PAIN MEDICINE | Facility: CLINIC | Age: 61
End: 2021-09-02

## 2021-09-07 NOTE — TELEPHONE ENCOUNTER
Scheduled pt for Left SIJ and piriformis inj for 9/20/21  Went over pre-procedure instructions below:  Nothing to eat or drink 1 hr prior to procedure  Need to arrange transportation  Proper clothing for procedure  If ill or placed on antibiotics please call to reschedule  Covid/travel/ and vaccine instructions

## 2021-09-20 ENCOUNTER — HOSPITAL ENCOUNTER (OUTPATIENT)
Dept: RADIOLOGY | Facility: CLINIC | Age: 61
Discharge: HOME | End: 2021-09-20
Admitting: PHYSICAL MEDICINE & REHABILITATION
Payer: COMMERCIAL

## 2021-09-20 VITALS
OXYGEN SATURATION: 96 % | TEMPERATURE: 98.4 F | HEART RATE: 74 BPM | RESPIRATION RATE: 18 BRPM | SYSTOLIC BLOOD PRESSURE: 110 MMHG | DIASTOLIC BLOOD PRESSURE: 69 MMHG

## 2021-09-20 DIAGNOSIS — M48.062 SPINAL STENOSIS OF LUMBAR REGION WITH NEUROGENIC CLAUDICATION: ICD-10-CM

## 2021-09-20 DIAGNOSIS — G89.29 CHRONIC BILATERAL LOW BACK PAIN WITHOUT SCIATICA: ICD-10-CM

## 2021-09-20 DIAGNOSIS — M54.50 CHRONIC BILATERAL LOW BACK PAIN WITHOUT SCIATICA: ICD-10-CM

## 2021-09-20 DIAGNOSIS — M53.3 SACROILIAC JOINT DYSFUNCTION OF BOTH SIDES: ICD-10-CM

## 2021-09-20 DIAGNOSIS — M51.26 DISPLACEMENT OF LUMBAR INTERVERTEBRAL DISC: ICD-10-CM

## 2021-09-20 DIAGNOSIS — G57.02 PIRIFORMIS SYNDROME OF LEFT SIDE: ICD-10-CM

## 2021-09-20 DIAGNOSIS — M47.816 FACET HYPERTROPHY OF LUMBAR REGION: ICD-10-CM

## 2021-09-20 PROCEDURE — 27096 INJECT SACROILIAC JOINT: CPT | Performed by: PHYSICAL MEDICINE & REHABILITATION

## 2021-09-20 PROCEDURE — 77002 NEEDLE LOCALIZATION BY XRAY: CPT | Performed by: PHYSICAL MEDICINE & REHABILITATION

## 2021-09-20 PROCEDURE — 20552 NJX 1/MLT TRIGGER POINT 1/2: CPT | Performed by: PHYSICAL MEDICINE & REHABILITATION

## 2021-09-20 RX ORDER — METHYLPREDNISOLONE ACETATE 80 MG/ML
80 INJECTION, SUSPENSION INTRA-ARTICULAR; INTRALESIONAL; INTRAMUSCULAR; PARENTERAL; SOFT TISSUE ONCE
Status: COMPLETED | OUTPATIENT
Start: 2021-09-20 | End: 2021-09-20

## 2021-09-20 RX ORDER — 0.9 % SODIUM CHLORIDE 0.9 %
10 VIAL (ML) INJECTION ONCE
Status: COMPLETED | OUTPATIENT
Start: 2021-09-20 | End: 2021-09-20

## 2021-09-20 RX ORDER — BUPIVACAINE HCL/PF 2.5 MG/ML
10 VIAL (ML) INJECTION ONCE
Status: COMPLETED | OUTPATIENT
Start: 2021-09-20 | End: 2021-09-20

## 2021-09-20 RX ADMIN — IOHEXOL 2 ML: 300 INJECTION, SOLUTION INTRAVENOUS at 15:38

## 2021-09-20 RX ADMIN — METHYLPREDNISOLONE ACETATE 80 MG: 80 INJECTION, SUSPENSION INTRA-ARTICULAR; INTRALESIONAL; INTRAMUSCULAR; PARENTERAL; SOFT TISSUE at 15:38

## 2021-09-20 RX ADMIN — Medication 2 ML: at 15:37

## 2021-09-20 RX ADMIN — SODIUM CHLORIDE 2 ML: 9 INJECTION, SOLUTION INTRAMUSCULAR; INTRAVENOUS; SUBCUTANEOUS at 15:37

## 2021-09-20 RX ADMIN — BUPIVACAINE HYDROCHLORIDE 6 ML: 2.5 INJECTION, SOLUTION EPIDURAL; INFILTRATION; INTRACAUDAL at 15:38

## 2021-09-20 NOTE — DISCHARGE INSTR - LAB
1  Do not apply heat to any area that is numb  If you have discomfort or soreness at the injection site, you may apply ice today, 20 minutes on and 20 minutes off  Tomorrow you may use ice or warm, moist heat  Do not apply ice or heat directly to the skin  2  If you experience severe shortness of breath, go to the Emergency Room  3  You may have numbness for several hours from the local anesthetic  Please use caution and common sense, especially with weight-bearing activities  4  You may have an increase or change in the discomfort for 36-48 hours after your treatment  Apply ice and continue with any pain medicine you have been prescribed  5  Do not do anything strenuous today  You may shower, but no tub baths or hot tubs today  You may resume your normal activities tomorrow, but do not overdo it  Resume normal activities slowly when you are feeling better  6  If you experience redness, drainage or swelling at the injection site, or if you develop a fever above 100 degrees, please call [facility] at [phone] or go to the Emergency Room  7  Continue to take all routine medicines prescribed by your primary care physician unless otherwise instructed by our staff  Most blood thinners should be started again according to your regularly scheduled dosing  If you have any questions, please give our office a call  As no general anesthesia was used in today's procedure, you should not experience any side effects related to anesthesia  If you have a problem specifically related to your procedure, please call our office at [phone]  Problems not related to your procedure should be directed to your primary care physician

## 2021-09-20 NOTE — H&P
History of Present Illness:  The patient is a 64 y o  female who presents with complaints of left-sided low back and buttock pain    Patient Active Problem List   Diagnosis    Breast lump on right side at 10 o'clock position    Essential hypertension    Generalized anxiety disorder    Fibromyalgia, primary    Chronic bilateral low back pain without sciatica    Facet hypertrophy of lumbar region    Displacement of lumbar intervertebral disc    Spinal stenosis of lumbar region with neurogenic claudication    Annual physical exam    Vitamin D deficiency    Class 3 severe obesity due to excess calories with serious comorbidity and body mass index (BMI) of 50 0 to 59 9 in adult ([facility] [address] )    Prediabetes       Past Medical History:   Diagnosis Date    Anxiety     Fibromyalgia     High blood pressure     Hypertension     Panic attacks        Past Surgical History:   Procedure Laterality Date    CHOLECYSTECTOMY      KNEE SURGERY      HERIBERTO-EN-Y PROCEDURE      SHOULDER OPEN ROTATOR CUFF REPAIR      SPINAL FUSION      TUBAL LIGATION           Current Outpatient Medications:     bisoprolol-hydrochlorothiazide (ZIAC) 10-6 25 MG per tablet, TAKE ONE TABLET BY MOUTH DAILY, Disp: 90 tablet, Rfl: 0    cyclobenzaprine (FLEXERIL) 10 mg tablet, Take 1 tablet (10 mg total) by mouth daily at bedtime, Disp: 10 tablet, Rfl: 0    DULoxetine (CYMBALTA) 60 mg delayed release capsule, Take 1 capsule (60 mg total) by mouth daily, Disp: 90 capsule, Rfl: 0    gabapentin (NEURONTIN) 800 mg tablet, Take 1 tablet (800 mg total) by mouth 3 (three) times a day, Disp: 90 tablet, Rfl: 2    LORazepam (ATIVAN) 0 5 mg tablet, Take 1 tablet (0 5 mg total) by mouth 2 (two) times a day, Disp: 60 tablet, Rfl: 0    Current Facility-Administered Medications:     bupivacaine (PF) (MARCAINE) 0 25 % injection 10 mL, 10 mL, Intra-articular, Once, Linda Rizzo DO    iohexol (OMNIPAQUE) 300 mg/mL injection 50 mL, 50 mL, Intra-articular, Once, Savanah Sandoval, DO    lidocaine (PF) (XYLOCAINE-MPF) 2 % injection 5 mL, 5 mL, Infiltration, Once, Savanah Sandoval, DO    methylPREDNISolone acetate (DEPO-MEDROL) injection 80 mg, 80 mg, Intra-articular, Once, Savanah Sandoval, DO    sodium chloride (PF) 0 9 % injection 10 mL, 10 mL, Infiltration, Once, Savanah Sandoval, DO    No Known Allergies    Physical Exam:   Vitals:    09/20/21 1525   BP: 143/62   Pulse: 69   Resp: 18   Temp: 98 4 °F (36 9 °C)   SpO2: 98%     General: Awake, Alert, Oriented x 3, Mood and affect appropriate  Respiratory: Respirations even and unlabored  Cardiovascular: Peripheral pulses intact; no edema  Musculoskeletal Exam:  Tenderness to palpation left lateral hip and buttock    ASA Score: 2  Patient has been made explicitly aware that the injection will consist of steroid which may cause a temporary suppression in immune system activity  This, in turn, may increase the patient's risk of leslee corona virus  He was advised to continue with social distancing and self quarantine  Patient wishes to proceed with the injection  Patient/Chart Verification  Patient ID Verified: Verbal  ID Band Applied: No  Consents Confirmed: Procedural, To be obtained in the Pre-Procedure area  H&P( within 30 days) Verified: To be obtained in the Pre-Procedure area  Allergies Reviewed: Yes  Anticoag/NSAID held?: No  Currently on antibiotics?: No    Assessment:   1  Facet hypertrophy of lumbar region    2  Chronic bilateral low back pain without sciatica    3  Spinal stenosis of lumbar region with neurogenic claudication    4  Displacement of lumbar intervertebral disc    5  Sacroiliac joint dysfunction of both sides    6   Piriformis syndrome of left side        Plan: Left SIJ and piriformis inj

## 2021-09-27 ENCOUNTER — TELEPHONE (OUTPATIENT)
Dept: PAIN MEDICINE | Facility: CLINIC | Age: 61
End: 2021-09-27

## 2021-09-27 DIAGNOSIS — I10 ESSENTIAL HYPERTENSION: ICD-10-CM

## 2021-09-27 DIAGNOSIS — M79.7 FIBROMYALGIA, PRIMARY: ICD-10-CM

## 2021-09-27 RX ORDER — BISOPROLOL FUMARATE AND HYDROCHLOROTHIAZIDE 10; 6.25 MG/1; MG/1
TABLET ORAL
Qty: 90 TABLET | Refills: 0 | Status: SHIPPED | OUTPATIENT
Start: 2021-09-27 | End: 2022-01-10

## 2021-09-27 RX ORDER — DULOXETIN HYDROCHLORIDE 60 MG/1
CAPSULE, DELAYED RELEASE ORAL
Qty: 90 CAPSULE | Refills: 0 | Status: SHIPPED | OUTPATIENT
Start: 2021-09-27 | End: 2021-12-27

## 2021-11-09 ENCOUNTER — OFFICE VISIT (OUTPATIENT)
Dept: PAIN MEDICINE | Facility: CLINIC | Age: 61
End: 2021-11-09
Payer: COMMERCIAL

## 2021-11-09 VITALS
WEIGHT: 293 LBS | SYSTOLIC BLOOD PRESSURE: 124 MMHG | BODY MASS INDEX: 53.55 KG/M2 | HEART RATE: 76 BPM | DIASTOLIC BLOOD PRESSURE: 68 MMHG

## 2021-11-09 DIAGNOSIS — G89.4 CHRONIC PAIN SYNDROME: Primary | ICD-10-CM

## 2021-11-09 DIAGNOSIS — M54.50 CHRONIC BILATERAL LOW BACK PAIN WITHOUT SCIATICA: ICD-10-CM

## 2021-11-09 DIAGNOSIS — M47.816 FACET HYPERTROPHY OF LUMBAR REGION: ICD-10-CM

## 2021-11-09 DIAGNOSIS — M79.7 FIBROMYALGIA, PRIMARY: ICD-10-CM

## 2021-11-09 DIAGNOSIS — M48.062 SPINAL STENOSIS OF LUMBAR REGION WITH NEUROGENIC CLAUDICATION: ICD-10-CM

## 2021-11-09 DIAGNOSIS — G89.29 CHRONIC BILATERAL LOW BACK PAIN WITHOUT SCIATICA: ICD-10-CM

## 2021-11-09 PROCEDURE — 99214 OFFICE O/P EST MOD 30 MIN: CPT | Performed by: PHYSICAL MEDICINE & REHABILITATION

## 2021-11-09 RX ORDER — TRAMADOL HYDROCHLORIDE 50 MG/1
50 TABLET ORAL 2 TIMES DAILY PRN
Qty: 60 TABLET | Refills: 0 | Status: SHIPPED | OUTPATIENT
Start: 2021-11-09 | End: 2021-12-08 | Stop reason: DRUGHIGH

## 2021-11-09 RX ORDER — NALOXONE HYDROCHLORIDE 4 MG/.1ML
SPRAY NASAL
Qty: 1 EACH | Refills: 1 | Status: SHIPPED | OUTPATIENT
Start: 2021-11-09 | End: 2022-01-10

## 2021-12-01 ENCOUNTER — IMMUNIZATIONS (OUTPATIENT)
Dept: FAMILY MEDICINE CLINIC | Facility: HOSPITAL | Age: 61
End: 2021-12-01

## 2021-12-01 DIAGNOSIS — Z23 ENCOUNTER FOR IMMUNIZATION: Primary | ICD-10-CM

## 2021-12-01 PROCEDURE — 91306 COVID-19 MODERNA VACC 0.25 ML BOOSTER: CPT

## 2021-12-01 PROCEDURE — 0064A COVID-19 MODERNA VACC 0.25 ML BOOSTER: CPT

## 2021-12-06 PROBLEM — G89.4 CHRONIC PAIN SYNDROME: Status: ACTIVE | Noted: 2021-12-06

## 2021-12-07 ENCOUNTER — OFFICE VISIT (OUTPATIENT)
Dept: PAIN MEDICINE | Facility: CLINIC | Age: 61
End: 2021-12-07
Payer: COMMERCIAL

## 2021-12-07 VITALS
DIASTOLIC BLOOD PRESSURE: 77 MMHG | BODY MASS INDEX: 50.02 KG/M2 | RESPIRATION RATE: 16 BRPM | HEIGHT: 64 IN | WEIGHT: 293 LBS | HEART RATE: 71 BPM | SYSTOLIC BLOOD PRESSURE: 155 MMHG

## 2021-12-07 DIAGNOSIS — G57.02 PIRIFORMIS SYNDROME OF LEFT SIDE: ICD-10-CM

## 2021-12-07 DIAGNOSIS — F11.20 UNCOMPLICATED OPIOID DEPENDENCE (HCC): ICD-10-CM

## 2021-12-07 DIAGNOSIS — M53.3 SACROILIAC JOINT DYSFUNCTION OF BOTH SIDES: ICD-10-CM

## 2021-12-07 DIAGNOSIS — M47.816 FACET HYPERTROPHY OF LUMBAR REGION: ICD-10-CM

## 2021-12-07 DIAGNOSIS — Z79.891 LONG-TERM CURRENT USE OF OPIATE ANALGESIC: ICD-10-CM

## 2021-12-07 DIAGNOSIS — M48.062 SPINAL STENOSIS OF LUMBAR REGION WITH NEUROGENIC CLAUDICATION: ICD-10-CM

## 2021-12-07 DIAGNOSIS — G89.4 CHRONIC PAIN SYNDROME: Primary | ICD-10-CM

## 2021-12-07 PROCEDURE — 80305 DRUG TEST PRSMV DIR OPT OBS: CPT | Performed by: NURSE PRACTITIONER

## 2021-12-07 PROCEDURE — 99214 OFFICE O/P EST MOD 30 MIN: CPT | Performed by: NURSE PRACTITIONER

## 2021-12-08 RX ORDER — TRAMADOL HYDROCHLORIDE 100 MG/1
TABLET, COATED ORAL
Qty: 60 TABLET | Refills: 2 | Status: SHIPPED | OUTPATIENT
Start: 2021-12-08

## 2021-12-09 LAB

## 2021-12-27 DIAGNOSIS — M79.7 FIBROMYALGIA, PRIMARY: ICD-10-CM

## 2021-12-27 RX ORDER — DULOXETIN HYDROCHLORIDE 60 MG/1
CAPSULE, DELAYED RELEASE ORAL
Qty: 90 CAPSULE | Refills: 0 | Status: SHIPPED | OUTPATIENT
Start: 2021-12-27 | End: 2022-03-14 | Stop reason: SDUPTHER

## 2022-01-10 DIAGNOSIS — F41.9 ANXIETY: ICD-10-CM

## 2022-01-10 DIAGNOSIS — I10 ESSENTIAL HYPERTENSION: ICD-10-CM

## 2022-01-10 RX ORDER — BISOPROLOL FUMARATE AND HYDROCHLOROTHIAZIDE 10; 6.25 MG/1; MG/1
TABLET ORAL
Qty: 90 TABLET | Refills: 0 | Status: SHIPPED | OUTPATIENT
Start: 2022-01-10 | End: 2022-03-14 | Stop reason: SDUPTHER

## 2022-01-10 RX ORDER — LORAZEPAM 0.5 MG/1
0.5 TABLET ORAL 2 TIMES DAILY
Qty: 60 TABLET | Refills: 0 | Status: SHIPPED | OUTPATIENT
Start: 2022-01-10 | End: 2022-03-14 | Stop reason: SDUPTHER

## 2022-01-27 ENCOUNTER — HOSPITAL ENCOUNTER (OUTPATIENT)
Dept: MAMMOGRAPHY | Facility: CLINIC | Age: 62
Discharge: HOME | End: 2022-01-27
Payer: COMMERCIAL

## 2022-01-27 VITALS — BODY MASS INDEX: 50.02 KG/M2 | HEIGHT: 64 IN | WEIGHT: 293 LBS

## 2022-01-27 DIAGNOSIS — Z12.31 ENCOUNTER FOR SCREENING MAMMOGRAM FOR BREAST CANCER: ICD-10-CM

## 2022-01-27 PROCEDURE — 77067 SCR MAMMO BI INCL CAD: CPT

## 2022-01-27 PROCEDURE — 77063 BREAST TOMOSYNTHESIS BI: CPT

## 2022-02-01 DIAGNOSIS — M54.50 CHRONIC BILATERAL LOW BACK PAIN WITHOUT SCIATICA: ICD-10-CM

## 2022-02-01 DIAGNOSIS — G89.29 CHRONIC BILATERAL LOW BACK PAIN WITHOUT SCIATICA: ICD-10-CM

## 2022-02-01 DIAGNOSIS — M47.816 FACET HYPERTROPHY OF LUMBAR REGION: ICD-10-CM

## 2022-02-01 DIAGNOSIS — M48.062 SPINAL STENOSIS OF LUMBAR REGION WITH NEUROGENIC CLAUDICATION: ICD-10-CM

## 2022-02-01 DIAGNOSIS — M51.26 DISPLACEMENT OF LUMBAR INTERVERTEBRAL DISC: ICD-10-CM

## 2022-02-01 RX ORDER — GABAPENTIN 800 MG/1
800 TABLET ORAL 3 TIMES DAILY
Qty: 90 TABLET | Refills: 5 | Status: SHIPPED | OUTPATIENT
Start: 2022-02-01 | End: 2022-02-03 | Stop reason: SDUPTHER

## 2022-02-03 DIAGNOSIS — M47.816 FACET HYPERTROPHY OF LUMBAR REGION: ICD-10-CM

## 2022-02-03 DIAGNOSIS — M51.26 DISPLACEMENT OF LUMBAR INTERVERTEBRAL DISC: ICD-10-CM

## 2022-02-03 DIAGNOSIS — M48.062 SPINAL STENOSIS OF LUMBAR REGION WITH NEUROGENIC CLAUDICATION: ICD-10-CM

## 2022-02-03 DIAGNOSIS — G89.29 CHRONIC BILATERAL LOW BACK PAIN WITHOUT SCIATICA: ICD-10-CM

## 2022-02-03 DIAGNOSIS — M54.50 CHRONIC BILATERAL LOW BACK PAIN WITHOUT SCIATICA: ICD-10-CM

## 2022-02-03 RX ORDER — GABAPENTIN 800 MG/1
800 TABLET ORAL 3 TIMES DAILY
Qty: 90 TABLET | Refills: 5 | Status: SHIPPED | OUTPATIENT
Start: 2022-02-03 | End: 2022-03-14

## 2022-02-11 ENCOUNTER — HOSPITAL ENCOUNTER (OUTPATIENT)
Dept: RADIOLOGY | Age: 62
Discharge: HOME | End: 2022-02-11
Payer: COMMERCIAL

## 2022-02-11 DIAGNOSIS — M54.16 LUMBAR RADICULOPATHY: ICD-10-CM

## 2022-02-11 DIAGNOSIS — M43.16 SPONDYLOLISTHESIS OF LUMBAR REGION: ICD-10-CM

## 2022-02-11 PROCEDURE — 72148 MRI LUMBAR SPINE W/O DYE: CPT

## 2022-02-11 PROCEDURE — G1004 CDSM NDSC: HCPCS

## 2022-02-28 ENCOUNTER — APPOINTMENT (OUTPATIENT)
Dept: RADIOLOGY | Facility: MEDICAL CENTER | Age: 62
End: 2022-02-28
Payer: COMMERCIAL

## 2022-02-28 ENCOUNTER — HOSPITAL ENCOUNTER (OUTPATIENT)
Dept: RADIOLOGY | Facility: MEDICAL CENTER | Age: 62
Discharge: HOME | End: 2022-02-28
Payer: COMMERCIAL

## 2022-02-28 DIAGNOSIS — M54.17 RADICULOPATHY, LUMBOSACRAL REGION: ICD-10-CM

## 2022-02-28 DIAGNOSIS — M43.10 SPONDYLOLISTHESIS, SITE UNSPECIFIED: ICD-10-CM

## 2022-02-28 PROCEDURE — 72131 CT LUMBAR SPINE W/O DYE: CPT

## 2022-02-28 PROCEDURE — 72114 X-RAY EXAM L-S SPINE BENDING: CPT

## 2022-02-28 PROCEDURE — G1004 CDSM NDSC: HCPCS

## 2022-03-07 ENCOUNTER — OFFICE VISIT (OUTPATIENT)
Dept: URGENT CARE | Facility: MEDICAL CENTER | Age: 62
End: 2022-03-07
Payer: COMMERCIAL

## 2022-03-07 VITALS
SYSTOLIC BLOOD PRESSURE: 140 MMHG | TEMPERATURE: 98.2 F | WEIGHT: 293 LBS | OXYGEN SATURATION: 97 % | DIASTOLIC BLOOD PRESSURE: 80 MMHG | BODY MASS INDEX: 50.02 KG/M2 | HEIGHT: 64 IN | HEART RATE: 70 BPM | RESPIRATION RATE: 18 BRPM

## 2022-03-07 DIAGNOSIS — R42 DIZZINESS AND GIDDINESS: Primary | ICD-10-CM

## 2022-03-07 LAB
GLUCOSE SERPL-MCNC: 109 MG/DL (ref 65–140)
SL AMB POCT GLUCOSE BLD: 109

## 2022-03-07 PROCEDURE — 93005 ELECTROCARDIOGRAM TRACING: CPT | Performed by: PHYSICIAN ASSISTANT

## 2022-03-07 PROCEDURE — 82948 REAGENT STRIP/BLOOD GLUCOSE: CPT | Performed by: PHYSICIAN ASSISTANT

## 2022-03-07 PROCEDURE — G0382 LEV 3 HOSP TYPE B ED VISIT: HCPCS | Performed by: PHYSICIAN ASSISTANT

## 2022-03-07 PROCEDURE — S9083 URGENT CARE CENTER GLOBAL: HCPCS | Performed by: PHYSICIAN ASSISTANT

## 2022-03-07 NOTE — LETTER
March 7, 2022     Patient: Li Wright   YOB: 1960   Date of Visit: 3/7/2022       To Whom it May Concern:    Li Wright was seen in my clinic on 3/7/2022  Please excuse 3/7/22 and 3/8/22  If you have any questions or concerns, please don't hesitate to call           Sincerely,          Alida Sommer PA-C        CC: Li Wright

## 2022-03-08 LAB
ATRIAL RATE: 64 BPM
P AXIS: 47 DEGREES
PR INTERVAL: 168 MS
QRS AXIS: -6 DEGREES
QRSD INTERVAL: 94 MS
QT INTERVAL: 402 MS
QTC INTERVAL: 414 MS
T WAVE AXIS: 23 DEGREES
VENTRICULAR RATE: 64 BPM

## 2022-03-08 PROCEDURE — 93010 ELECTROCARDIOGRAM REPORT: CPT | Performed by: INTERNAL MEDICINE

## 2022-03-08 NOTE — PROGRESS NOTES
[address]        NAME: Ria Hirsch is a Melva Riddle 89 y o  female  : 1960    MRN: [id number]  DATE: 2022  TIME: 7:29 PM    Assessment and Plan   Dizziness and giddiness [R42]  1  Dizziness and giddiness  ECG 12 lead    POCT blood glucose     POCT blood glucose is 109  EKG shows NSR at 64 B p m  No acute ST or T-wave changes  No abnormalities noted on physical exam  Orthostatic vitals within normal limits  Patient currently has no dizziness  She does have follow-up with PCP next week which she will keep, advised she may need lab work in the near future  She will also keep an eye on blood pressure as it was slightly high in the office  Patient Instructions   Patient has follow up with family doctor next week which I recommended she keep, if symptoms worsen, report to ER  Chief Complaint     Chief Complaint   Patient presents with    Dizziness     patient states this morning since 4am she has had intermittent periods of dizziness; patient states she feels dizzy with specific changes in position such as bending over or getting out of bed; denies visual disturbances, slurred speech, extremity weakness          History of Present Illness       Patient is a 71-year-old female who presents today with complaints of intermittent dizziness starting today  She reports when she was bending over to feed the dog she felt dizzy but there was no syncopal episode  Then another episode occurred again when she moved her head quickly to the side  Patient does admit to poor water intake, approximately 30 oz daily  Is not a diabetic and has no heart conditions  She does have issues with her back and hip for which she takes tramadol as needed  She denies any chest pain or shortness of breath  No headache or blurred vision  No weakness or gait disturbance  She did have a sore throat earlier today but no other URI symptoms  Denies any actual pain in the ears        Review of Systems   Review of Systems   Constitutional: Negative for chills and fever  HENT: Positive for sore throat  Negative for congestion, ear pain and hearing loss  Eyes: Negative for photophobia and visual disturbance  Respiratory: Negative for cough and shortness of breath  Cardiovascular: Negative for chest pain, palpitations and leg swelling  Gastrointestinal: Negative for abdominal pain, nausea and vomiting  Genitourinary: Negative for difficulty urinating  Musculoskeletal: Positive for arthralgias  Chronic hip and back pain   Skin: Negative for color change and rash  Neurological: Positive for dizziness  Negative for syncope, facial asymmetry, speech difficulty, weakness and headaches           Current Medications       Current Outpatient Medications:     bisoprolol-hydrochlorothiazide (ZIAC) 10-6 25 MG per tablet, TAKE ONE TABLET BY MOUTH DAILY, Disp: 90 tablet, Rfl: 0    DULoxetine (CYMBALTA) 60 mg delayed release capsule, TAKE ONE CAPSULE BY MOUTH DAILY, Disp: 90 capsule, Rfl: 0    gabapentin (NEURONTIN) 800 mg tablet, Take 1 tablet (800 mg total) by mouth 3 (three) times a day, Disp: 90 tablet, Rfl: 5    LORazepam (ATIVAN) 0 5 mg tablet, Take 1 tablet (0 5 mg total) by mouth 2 (two) times a day, Disp: 60 tablet, Rfl: 0    traMADol 100 MG TABS, Take 1 tablet by mouth twice daily as needed for pain, Disp: 60 tablet, Rfl: 2    Current Allergies     Allergies as of 03/07/2022    (No Known Allergies)            The following portions of the patient's history were reviewed and updated as appropriate: allergies, current medications, past family history, past medical history, past social history, past surgical history and problem list      Past Medical History:   Diagnosis Date    Anxiety     Fibromyalgia     High blood pressure     Hypertension     Panic attacks        Past Surgical History:   Procedure Laterality Date    CHOLECYSTECTOMY      KNEE SURGERY      HERIBERTO-EN-Y PROCEDURE      SHOULDER OPEN ROTATOR CUFF REPAIR      SPINAL FUSION      TUBAL LIGATION         Family History   Problem Relation Age of Onset    Hypertension Mother     Heart attack Father         late 46s     Hypertension Brother     No Known Problems Sister     No Known Problems Maternal Grandmother     No Known Problems Paternal Grandmother     No Known Problems Maternal Aunt     No Known Problems Paternal Aunt     Breast cancer Neg Hx          Medications have been verified  Objective   /80 Comment: laying  Pulse 70   Temp 98 2 °F (36 8 °C)   Resp 18   Ht 5' 4" (1 626 m)   Wt 133 kg (293 lb)   SpO2 97%   BMI 50 29 kg/m²        Physical Exam     Physical Exam  Constitutional:       General: She is not in acute distress  Appearance: Normal appearance  She is obese  She is not ill-appearing, toxic-appearing or diaphoretic  HENT:      Head: Normocephalic and atraumatic  Right Ear: Tympanic membrane and ear canal normal       Left Ear: Tympanic membrane and ear canal normal       Nose: Nose normal       Mouth/Throat:      Mouth: Mucous membranes are moist       Pharynx: Oropharynx is clear  Eyes:      Extraocular Movements: Extraocular movements intact  Conjunctiva/sclera: Conjunctivae normal       Pupils: Pupils are equal, round, and reactive to light  Cardiovascular:      Rate and Rhythm: Normal rate and regular rhythm  Pulses: Normal pulses  Dorsalis pedis pulses are 2+ on the right side and 2+ on the left side  Pulmonary:      Effort: Pulmonary effort is normal       Breath sounds: Normal breath sounds  Abdominal:      General: Bowel sounds are normal  There is no distension  Palpations: Abdomen is soft  Tenderness: There is no abdominal tenderness  Musculoskeletal:      Right lower leg: No edema  Left lower leg: No edema  Skin:     General: Skin is warm and dry  Neurological:      General: No focal deficit present        Mental Status: She is alert and oriented to person, place, and time  Cranial Nerves: No cranial nerve deficit  Motor: No weakness  Gait: Gait normal    Psychiatric:         Mood and Affect: Mood normal          Behavior: Behavior normal          Thought Content:  Thought content normal

## 2022-03-14 ENCOUNTER — CONSULT (OUTPATIENT)
Dept: FAMILY MEDICINE CLINIC | Facility: CLINIC | Age: 62
End: 2022-03-14
Payer: COMMERCIAL

## 2022-03-14 VITALS
RESPIRATION RATE: 16 BRPM | TEMPERATURE: 97 F | OXYGEN SATURATION: 98 % | HEART RATE: 70 BPM | HEIGHT: 64 IN | WEIGHT: 285 LBS | DIASTOLIC BLOOD PRESSURE: 88 MMHG | SYSTOLIC BLOOD PRESSURE: 128 MMHG | BODY MASS INDEX: 48.65 KG/M2

## 2022-03-14 DIAGNOSIS — M48.062 SPINAL STENOSIS OF LUMBAR REGION WITH NEUROGENIC CLAUDICATION: ICD-10-CM

## 2022-03-14 DIAGNOSIS — R73.03 PREDIABETES: Primary | ICD-10-CM

## 2022-03-14 DIAGNOSIS — R01.1 HEART MURMUR: ICD-10-CM

## 2022-03-14 DIAGNOSIS — M51.26 DISPLACEMENT OF LUMBAR INTERVERTEBRAL DISC: ICD-10-CM

## 2022-03-14 DIAGNOSIS — Z12.11 COLON CANCER SCREENING: ICD-10-CM

## 2022-03-14 DIAGNOSIS — G89.29 CHRONIC BILATERAL LOW BACK PAIN WITHOUT SCIATICA: ICD-10-CM

## 2022-03-14 DIAGNOSIS — F41.9 ANXIETY: ICD-10-CM

## 2022-03-14 DIAGNOSIS — I10 ESSENTIAL HYPERTENSION: ICD-10-CM

## 2022-03-14 DIAGNOSIS — M47.816 FACET HYPERTROPHY OF LUMBAR REGION: ICD-10-CM

## 2022-03-14 DIAGNOSIS — F41.1 GENERALIZED ANXIETY DISORDER: ICD-10-CM

## 2022-03-14 DIAGNOSIS — M79.7 FIBROMYALGIA, PRIMARY: ICD-10-CM

## 2022-03-14 DIAGNOSIS — M54.50 CHRONIC BILATERAL LOW BACK PAIN WITHOUT SCIATICA: ICD-10-CM

## 2022-03-14 PROBLEM — Z01.818 PRE-OP EXAM: Status: ACTIVE | Noted: 2022-03-14

## 2022-03-14 LAB — SL AMB POCT HEMOGLOBIN AIC: 5.8 (ref ?–6.5)

## 2022-03-14 PROCEDURE — 83036 HEMOGLOBIN GLYCOSYLATED A1C: CPT | Performed by: FAMILY MEDICINE

## 2022-03-14 PROCEDURE — 99214 OFFICE O/P EST MOD 30 MIN: CPT | Performed by: FAMILY MEDICINE

## 2022-03-14 RX ORDER — DULOXETIN HYDROCHLORIDE 30 MG/1
30 CAPSULE, DELAYED RELEASE ORAL DAILY
Qty: 90 CAPSULE | Refills: 3 | Status: SHIPPED | OUTPATIENT
Start: 2022-03-14

## 2022-03-14 RX ORDER — GABAPENTIN 400 MG/1
400 CAPSULE ORAL 3 TIMES DAILY
Qty: 270 CAPSULE | Refills: 3 | Status: SHIPPED | OUTPATIENT
Start: 2022-03-14

## 2022-03-14 RX ORDER — GABAPENTIN 800 MG/1
800 TABLET ORAL 3 TIMES DAILY
Qty: 90 TABLET | Refills: 5 | Status: CANCELLED | OUTPATIENT
Start: 2022-03-14

## 2022-03-14 RX ORDER — LORAZEPAM 0.5 MG/1
0.5 TABLET ORAL 2 TIMES DAILY
Qty: 60 TABLET | Refills: 0 | Status: SHIPPED | OUTPATIENT
Start: 2022-03-14 | End: 2022-06-20

## 2022-03-14 RX ORDER — DULOXETIN HYDROCHLORIDE 60 MG/1
60 CAPSULE, DELAYED RELEASE ORAL DAILY
Qty: 90 CAPSULE | Refills: 0 | Status: SHIPPED | OUTPATIENT
Start: 2022-03-14 | End: 2022-06-17

## 2022-03-14 RX ORDER — BISOPROLOL FUMARATE AND HYDROCHLOROTHIAZIDE 10; 6.25 MG/1; MG/1
1 TABLET ORAL DAILY
Qty: 90 TABLET | Refills: 0 | Status: SHIPPED | OUTPATIENT
Start: 2022-03-14 | End: 2022-06-20

## 2022-03-14 NOTE — PROGRESS NOTES
Assessment/Plan:         Problem List Items Addressed This Visit        Cardiovascular and Mediastinum    Essential hypertension     Well controlled on current therapy continue with current medications and will reassess next visit pt vry anxious today           Relevant Medications    bisoprolol-hydrochlorothiazide (ZIAC) 10-6 25 MG per tablet       Musculoskeletal and Integument    Fibromyalgia, primary     Pt wants to wean down will try 400mg po tid         Relevant Medications    DULoxetine (CYMBALTA) 60 mg delayed release capsule    gabapentin (NEURONTIN) 400 mg capsule    Facet hypertrophy of lumbar region    Displacement of lumbar intervertebral disc       Other    Generalized anxiety disorder     Pt feels her anxiety is "through the roof"  Increase duloxetine to 90mg po qd          Relevant Medications    DULoxetine (CYMBALTA) 60 mg delayed release capsule    LORazepam (ATIVAN) 0 5 mg tablet    DULoxetine (Cymbalta) 30 mg delayed release capsule    Chronic bilateral low back pain without sciatica     Pt scheduled for surgery 4/5/22 will need  To see [facility] and get cardiac clearance         Spinal stenosis of lumbar region with neurogenic claudication    Prediabetes - Primary     Needs HGA1c; now 5 8         Relevant Orders    POCT hemoglobin A1c (Completed)    Heart murmur     2/6 systolic needs cardiac eval before surgery           Other Visit Diagnoses     Anxiety        Relevant Medications    LORazepam (ATIVAN) 0 5 mg tablet    DULoxetine (Cymbalta) 30 mg delayed release capsule    Colon cancer screening        Relevant Orders    Cologuard            Subjective: pt has herniated disc and spinal stenosis will have surgery 4/5/22 pt will have this at [facility] has PAT 3/30/22 pt had episode 1 week of  Acute imbalance  Some mild spinning  At the time no further episodes since then  [facility] nl       Patient ID: Elinor Raymond is a 64 y o  female      HPI    The following portions of the patient's history were reviewed and updated as appropriate:   Past Medical History:  She has a past medical history of Anxiety, Fibromyalgia, High blood pressure, Hypertension, and Panic attacks  ,  _______________________________________________________________________  Medical Problems:  does not have any pertinent problems on file ,  _______________________________________________________________________  Past Surgical History:   has a past surgical history that includes Tubal ligation; Shoulder open rotator cuff repair; Cholecystectomy; Knee surgery; Spinal fusion; and Yoanna-en-y procedure ,  _______________________________________________________________________  Family History:  family history includes Heart attack in her father; Hypertension in her brother and mother; No Known Problems in her maternal aunt, maternal grandmother, paternal aunt, paternal grandmother, and sister  ,  _______________________________________________________________________  Social History:   reports that she quit smoking about 4 years ago  She started smoking about 42 years ago  She has a 18 50 pack-year smoking history  She has never used smokeless tobacco  She reports current alcohol use of about 4 0 standard drinks of alcohol per week  She reports that she does not use drugs  ,  _______________________________________________________________________  Allergies:  has No Known Allergies     _______________________________________________________________________  Current Outpatient Medications   Medication Sig Dispense Refill    bisoprolol-hydrochlorothiazide (ZIAC) 10-6 25 MG per tablet Take 1 tablet by mouth daily 90 tablet 0    DULoxetine (CYMBALTA) 60 mg delayed release capsule Take 1 capsule (60 mg total) by mouth daily 90 capsule 0    LORazepam (ATIVAN) 0 5 mg tablet Take 1 tablet (0 5 mg total) by mouth 2 (two) times a day 60 tablet 0    traMADol 100 MG TABS Take 1 tablet by mouth twice daily as needed for pain 60 tablet 2  DULoxetine (Cymbalta) 30 mg delayed release capsule Take 1 capsule (30 mg total) by mouth daily 90 capsule 3    gabapentin (NEURONTIN) 400 mg capsule Take 1 capsule (400 mg total) by mouth 3 (three) times a day 270 capsule 3     No current facility-administered medications for this visit      _______________________________________________________________________  Review of Systems   Constitutional: Negative for chills and fever  HENT: Negative for congestion  Respiratory: Negative for chest tightness, shortness of breath and wheezing  Cardiovascular: Negative for chest pain, palpitations and leg swelling  Gastrointestinal: Negative for abdominal pain, diarrhea, nausea and vomiting  Genitourinary: Negative for difficulty urinating, frequency and urgency  Musculoskeletal: Positive for back pain, gait problem and myalgias  Skin: Negative for rash  Neurological: Negative for dizziness, light-headedness, numbness and headaches  Hematological: Does not bruise/bleed easily  Psychiatric/Behavioral: Negative for dysphoric mood  The patient is nervous/anxious  Objective:  Vitals:    03/14/22 1531   BP: 128/88   BP Location: Left arm   Patient Position: Sitting   Cuff Size: Large   Pulse: 70   Resp: 16   Temp: (!) 97 °F (36 1 °C)   TempSrc: Temporal   SpO2: 98%   Weight: 129 kg (285 lb)   Height: 5' 4" (1 626 m)     Body mass index is 48 92 kg/m²  Physical Exam  Constitutional:       General: She is not in acute distress  Appearance: Normal appearance  She is well-developed  She is obese  She is not ill-appearing  HENT:      Mouth/Throat:      Mouth: Mucous membranes are moist    Eyes:      Extraocular Movements: Extraocular movements intact  Conjunctiva/sclera: Conjunctivae normal       Pupils: Pupils are equal, round, and reactive to light  Neck:      Thyroid: No thyromegaly  Vascular: No carotid bruit     Cardiovascular:      Rate and Rhythm: Normal rate and regular rhythm  Pulses: Normal pulses  Heart sounds: Murmur (2/6 systolic ) heard  Gallop: requires cane  Pulmonary:      Effort: Pulmonary effort is normal  No respiratory distress  Breath sounds: Normal breath sounds  Abdominal:      General: There is no distension  Palpations: Abdomen is soft  Tenderness: There is no abdominal tenderness  Musculoskeletal:      Cervical back: Normal range of motion  Lymphadenopathy:      Cervical: No cervical adenopathy  Skin:     General: Skin is warm and dry  Neurological:      General: No focal deficit present  Mental Status: She is alert and oriented to person, place, and time  Mental status is at baseline  Cranial Nerves: No cranial nerve deficit        Coordination: Coordination normal       Gait: Gait abnormal       Deep Tendon Reflexes: Reflexes normal    Psychiatric:         Mood and Affect: Mood normal          Behavior: Behavior normal

## 2022-03-14 NOTE — ASSESSMENT & PLAN NOTE
Pt will get PATs at [place] will need clearance from cardiology given  HTN prediabetes  Episodes appear to be  vertigo with acute imbalance no loss of consciousness will need cardiac clearance

## 2022-03-14 NOTE — ASSESSMENT & PLAN NOTE
Well controlled on current therapy continue with current medications and will reassess next visit pt zohreh anxious today

## 2022-03-17 ENCOUNTER — HOSPITAL ENCOUNTER (OUTPATIENT)
Dept: RADIOLOGY | Age: 62
Discharge: HOME | End: 2022-03-17
Payer: COMMERCIAL

## 2022-03-17 DIAGNOSIS — M54.17 RADICULOPATHY, LUMBOSACRAL REGION: ICD-10-CM

## 2022-03-17 DIAGNOSIS — M43.10 SPONDYLOLISTHESIS, SITE UNSPECIFIED: ICD-10-CM

## 2022-03-17 PROCEDURE — 77080 DXA BONE DENSITY AXIAL: CPT

## 2022-03-29 ENCOUNTER — TELEPHONE (OUTPATIENT)
Dept: FAMILY MEDICINE CLINIC | Facility: CLINIC | Age: 62
End: 2022-03-29

## 2022-03-29 NOTE — TELEPHONE ENCOUNTER
We have no PAT results what ws ordered LABS? CHEST XRAY?  I have clearance from cardiologist but no form  [facility] and get PATs and form if they have no form we can use our presurgical form

## 2022-03-29 NOTE — TELEPHONE ENCOUNTER
Patient left a message on the machine about surgery paperwork that needs to be faxed for release to a [place] office?     Patient ph # [phone]

## 2022-03-29 NOTE — TELEPHONE ENCOUNTER
Left message with [facility] because they are already closed to call me back and ask to fax form over- if I dont hear by tomorrow- will call back

## 2022-03-31 ENCOUNTER — TELEPHONE (OUTPATIENT)
Dept: FAMILY MEDICINE CLINIC | Facility: CLINIC | Age: 62
End: 2022-03-31

## 2022-03-31 PROBLEM — I35.1 NONRHEUMATIC AORTIC VALVE INSUFFICIENCY: Status: ACTIVE | Noted: 2022-03-31

## 2022-03-31 NOTE — TELEPHONE ENCOUNTER
Patient left a message stating that all her blood work and pre-op testing is in her chart and her surgery in on Tuesday

## 2022-04-01 ENCOUNTER — TELEPHONE (OUTPATIENT)
Dept: FAMILY MEDICINE CLINIC | Facility: CLINIC | Age: 62
End: 2022-04-01

## 2022-04-01 NOTE — TELEPHONE ENCOUNTER
Reviewed PAT labs sent from [facility] for surgery does have anemia will need iron panel and colonoscopy and endoscopy and GI eval after her surgery

## 2022-04-04 DIAGNOSIS — D64.9 ANEMIA, UNSPECIFIED TYPE: Primary | ICD-10-CM

## 2022-04-22 ENCOUNTER — TELEPHONE (OUTPATIENT)
Dept: PAIN MEDICINE | Facility: MEDICAL CENTER | Age: 62
End: 2022-04-22

## 2022-04-22 NOTE — TELEPHONE ENCOUNTER
Mickie Antonio from [employer] called to ask if [facility] has received the records request- informed caller that [facility] has send the request to [facility] on 4/21 for processing and offered the O contact number - caller is ok

## 2022-05-12 NOTE — TELEPHONE ENCOUNTER
Pt called in to check on the status of her medical records request   Due date 05-  Please be advised thank you    Pt can be contacted @ [phone]

## 2022-05-12 NOTE — TELEPHONE ENCOUNTER
Spoke with patient   Provided ID # for MRO and advised she contact [employer]/ [employer] for follow up

## 2022-05-16 ENCOUNTER — TELEPHONE (OUTPATIENT)
Dept: FAMILY MEDICINE CLINIC | Facility: CLINIC | Age: 62
End: 2022-05-16

## 2022-05-16 NOTE — TELEPHONE ENCOUNTER
Patient called wondering why her prescription for Lorazepam was denied  I explained it was because she had received a prescription for Diazepam 5mg  She said she received those when she had her back surgery but only took them for a week  She said if you think it will help her to sleep she will just take the rest of them and call for the Lorazepam when she is out   Or should she just continue to be off of them and get the Lorazepam

## 2022-05-18 ENCOUNTER — EVALUATION (OUTPATIENT)
Dept: PHYSICAL THERAPY | Facility: MEDICAL CENTER | Age: 62
End: 2022-05-18
Payer: COMMERCIAL

## 2022-05-18 DIAGNOSIS — Z98.1 S/P LUMBAR FUSION: Primary | ICD-10-CM

## 2022-05-18 PROCEDURE — 97112 NEUROMUSCULAR REEDUCATION: CPT

## 2022-05-18 PROCEDURE — 97161 PT EVAL LOW COMPLEX 20 MIN: CPT

## 2022-05-18 NOTE — LETTER
May 19, 2022    Karlos Mcdaniel MD  [address] [facility] [address]    Patient: Faustino Haynes   YOB: 1960   Date of Visit: 2022     Encounter Diagnosis     ICD-10-CM    1  S/P lumbar fusion  Z98 1        Dear Dr Cherelle Mejia: Thank you for your recent referral of aFustino Haynes  Please review the attached evaluation summary from Silvia's recent visit  Please verify that you agree with the plan of care by signing the attached order  If you have any questions or concerns, please do not hesitate to call  I sincerely appreciate the opportunity to share in the care of one of your patients and hope to have another opportunity to work with you in the near future  Sincerely,    Samanta Chan, PT      Referring Provider:      I certify that I have read the below Plan of Care and certify the need for these services furnished under this plan of treatment while under my care  Karlos Mcdaniel MD  [address] [place] [id number]  Via Fax: [phone]          PT Evaluation     Today's date: 2022  Patient name: Faustino Haynes  : 1960  MRN: [id number]  Referring provider: Sarah Shore MD  Dx:   Encounter Diagnosis     ICD-10-CM    1  S/P lumbar fusion  Z98 1        Start Time: 1230  Stop Time: 1315  Total time in clinic (min): 45 minutes    Assessment  Assessment details: Patient is a 64year old female reporting to therapy S/P lumbar fusion of the L4-L5 levels which occurred on 22  Patient is currently just over 4 weeks post-op  Upon exam, patient presents with low back soreness and deficits in lower extremity strength, core strength, ROM, endurance, and flexibility which interferes with their ability to perform ADLs  This includes walking, performing transfers, and navigating her community environment  Further examination revealed reduce lower extremity strength according to manual muscle testing   Patient also displays slow gait speed with minor unsteadiness which resulted in increased TUG test scores and increased 10 meter walk test times  She was able to recall and follow her spinal precautions throughout the session  She further displayed appropriate mechanics and following of precautions during bed mobility assessment  Her incision appears to be healing well with no significant redness or drainage  The patient was educated today on their HEP including the proper form of the exercises and the importance of performing them daily in order to improve strength and function  She performed the exercises today without pain  They are a good candidate for therapy in order to address their stated deficits and improve their function so they can return to all activities  Positive prognostic factors include good reception of information presented today and motivation to improve her functional levels  Negative prognostic factors include chronicity of symptoms and time spent sedentary           Impairments: abnormal gait, abnormal or restricted ROM, abnormal movement, activity intolerance, impaired balance, impaired physical strength, lacks appropriate home exercise program, pain with function and poor body mechanics    Symptom irritability: lowUnderstanding of Dx/Px/POC: good   Prognosis: good    Goals  STG  -Patient will be IND with basic level HEP within 4 weeks in order to make improvements at home   - Patient will have a decrease in 2 points on the NPRS within 4 weeks in order to improve quality of life  - Patient will return proper gait mechanics without a SPC within 4 weeks  -Patient will improve 10 meter walk test by 0 2 m/sec within 5 weeks  - patient will continue to follow spinal precautions until cleared by the doctor      LTG  -Patient will be IND with an advanced level HEP within 8 weeks in order to make improvements at home   - Patient will have a decrease in 3 points on the NPRS within 6 week in order to improve quality of life  - Patient will reach her FOTO score goal within 8 weeks  -patient will be an unlimited community Ambulator within 12 weeks in order to return to all activities   Patient will improve 10 meter walk test by 0 4 m/sec within 10 weeks   - MMT strength scores of 5/5 within 12 weeks    Plan  Plan details: Skilled PT to improve strength, ROM, gait, flexibility, endurance, pain, and function  Patient would benefit from: PT eval and skilled physical therapy  Referral necessary: No  Planned modality interventions: TENS, cryotherapy, biofeedback and electrical stimulation/Russian stimulation  Planned therapy interventions: manual therapy, massage, motor coordination training, muscle pump exercises, abdominal trunk stabilization, ADL retraining, ADL training, activity modification, balance, neuromuscular re-education, patient education, postural training, strengthening, stretching, therapeutic activities, therapeutic exercise, flexibility, functional ROM exercises, gait training, graded exercise and home exercise program  Frequency: 2x week  Duration in weeks: 12  Plan of Care beginning date: 5/18/2022  Plan of Care expiration date: 8/10/2022  Treatment plan discussed with: patient        Subjective Evaluation    History of Present Illness  Mechanism of injury: Patient reports to therapy one month s/p lumbar fusion of the L4-L5 levels which the surgery occurring on 4/18/22  She notes prior to the surgery she had been having back and leg pain for 16-18 months with no relief from physical therapy, injections, or pain management  Since her surgery, her back and leg pain is "much better"  Although, she still feels "a little bit of tingling in her legs" but it is nothing like it was before  In the house she is walking without an AD but in the community she is using a SPC because she feels a little unsteady  No falls reported since her operation   She notes her endurance is decreased because she hasn't been doing much for the last year to year and a half  Pain is currently described as being "just a little bit sore" in her low back  She has been doing inspections on her incision and she reports "it looks good"  Patient's goals for therapy are "to get her strength back" and to walk better  Patient reports the doctor ordered her to keep bending, lifting, and twisting motions very gentle  [place] and only has to do 2 steps within the house  She reports no major difficulty moving around her home environment  Not a recurrent problem   Quality of life: fair    Pain  Current pain ratin  At best pain ratin  At worst pain rating: 3  Quality: discomfort, tight and dull ache  Relieving factors: relaxation, rest and medications    Social Support  Stairs in house: yes   2  Lives in: [facility]  Lives with: spouse    Employment status: not working    Diagnostic Tests  X-ray: abnormal  MRI studies: abnormal  Treatments  Previous treatment: physical therapy and injection treatment  Patient Goals  Patient goals for therapy: increased strength, return to work, return to [employer] activities, independence with ADLs/IADLs, increased motion, improved balance and decreased edema          Objective     Neurological Testing     Sensation     Lumbar   Left   Intact: light touch    Right   Diminished: light touch    Comments   Right light touch: diminished L5 and S1 dermatomes    Reflexes   Left   Patellar (L4): normal (2+)    Right   Patellar (L4): normal (2+)    Additional Neurological Details  Myotomes  WNL  Dermatomes  Patient reports minor numbness and tingling at bilateral toes    Patient denies red flag questions such as saddle paraesthesia or bowel and bladder dysfunction     Active Range of Motion     Additional Active Range of Motion Details  Active lumbar motions not assessed at this time in order to maintain precautions of bending, lifting, and twisting  Patient is currently only 4 weeks post op       Strength/Myotome Testing     Left Hip   Planes of Motion   Flexion: 4-  Extension: 3  Abduction: 3+  External rotation: 3    Right Hip   Planes of Motion   Flexion: 4-  Extension: 3  Abduction: 4-  External rotation: 3+    Left Knee   Flexion: 4  Extension: 4    Right Knee   Flexion: 4  Extension: 4    Left Ankle/Foot   Dorsiflexion: 4  Plantar flexion: 4    Right Ankle/Foot   Dorsiflexion: 4  Plantar flexion: 4    Muscle Activation   Patient able to activate left transverse abdominals and right transverse abdominals  Additional Muscle Activation Details  Decreased glute max activation     Ambulation     Ambulation: Stairs   Ascend stairs: independent  Pattern: non-reciprocal  Railings: two rails  Descend stairs: independent  Pattern: non-reciprocal  Railings: two rails    Additional Stairs Ambulation Details  Slow step to step pattern     Observational Gait   Decreased walking speed, stride length, left stance time, right stance time, left swing time, right swing time, left step length and right step length  Functional Assessment        Comments  Functional tests/ measures     TUG- 16 08 sec with SPC   10 M WT - 10 meters/12 05 sec = 0 83 m/sec      General Comments:      Lumbar Comments  Incision inspection  - incision appears to be healing well with no significant redness, discharge, or drainage                Precautions:   Spinal precautions: bending, lifting, twisting, L4-L5 fusion       Manuals 5/18            Paraspinal STM                                                     Neuro Re-Ed             Glute set  Perfrmed HEP 20x 2"            TA Performed HEP 2x10 2"             BKFO             Clam shells                                                    Ther Ex             SAQ Performed Hep 2x10 2"             LAQ                          Hamstring curls             Step ups              minisquats             Treadmill              Heel raises              Calf stretch                           Ther Activity Gait Training                                       Modalities

## 2022-05-18 NOTE — PROGRESS NOTES
PT Evaluation     Today's date: 2022  Patient name: Katia Cantrell  : 1960  MRN: [id number]  Referring provider: Letty Weiss MD  Dx:   Encounter Diagnosis     ICD-10-CM    1  S/P lumbar fusion  Z98 1        Start Time: 1230  Stop Time: 1315  Total time in clinic (min): 45 minutes    Assessment  Assessment details: Patient is a 64year old female reporting to therapy S/P lumbar fusion of the L4-L5 levels which occurred on 22  Patient is currently just over 4 weeks post-op  Upon exam, patient presents with low back soreness and deficits in lower extremity strength, core strength, ROM, endurance, and flexibility which interferes with their ability to perform ADLs  This includes walking, performing transfers, and navigating her community environment  Further examination revealed reduce lower extremity strength according to manual muscle testing  Patient also displays slow gait speed with minor unsteadiness which resulted in increased TUG test scores and increased 10 meter walk test times  She was able to recall and follow her spinal precautions throughout the session  She further displayed appropriate mechanics and following of precautions during bed mobility assessment  Her incision appears to be healing well with no significant redness or drainage  The patient was educated today on their HEP including the proper form of the exercises and the importance of performing them daily in order to improve strength and function  She performed the exercises today without pain  They are a good candidate for therapy in order to address their stated deficits and improve their function so they can return to all activities  Positive prognostic factors include good reception of information presented today and motivation to improve her functional levels  Negative prognostic factors include chronicity of symptoms and time spent sedentary           Impairments: abnormal gait, abnormal or restricted ROM, abnormal movement, activity intolerance, impaired balance, impaired physical strength, lacks appropriate home exercise program, pain with function and poor body mechanics    Symptom irritability: lowUnderstanding of Dx/Px/POC: good   Prognosis: good    Goals  STG  -Patient will be IND with basic level HEP within 4 weeks in order to make improvements at home   - Patient will have a decrease in 2 points on the NPRS within 4 weeks in order to improve quality of life  - Patient will return proper gait mechanics without a SPC within 4 weeks  -Patient will improve 10 meter walk test by 0 2 m/sec within 5 weeks  - patient will continue to follow spinal precautions until cleared by the doctor      LTG  -Patient will be IND with an advanced level HEP within 8 weeks in order to make improvements at home   - Patient will have a decrease in 3 points on the NPRS within 6 week in order to improve quality of life  - Patient will reach her FOTO score goal within 8 weeks  -patient will be an unlimited community Ambulator within 12 weeks in order to return to all activities   Patient will improve 10 meter walk test by 0 4 m/sec within 10 weeks   - MMT strength scores of 5/5 within 12 weeks    Plan  Plan details: Skilled PT to improve strength, ROM, gait, flexibility, endurance, pain, and function       Patient would benefit from: PT eval and skilled physical therapy  Referral necessary: No  Planned modality interventions: TENS, cryotherapy, biofeedback and electrical stimulation/Russian stimulation  Planned therapy interventions: manual therapy, massage, motor coordination training, muscle pump exercises, abdominal trunk stabilization, ADL retraining, ADL training, activity modification, balance, neuromuscular re-education, patient education, postural training, strengthening, stretching, therapeutic activities, therapeutic exercise, flexibility, functional ROM exercises, gait training, graded exercise and home exercise program  Frequency: 2x week  Duration in weeks: 12  Plan of Care beginning date: 2022  Plan of Care expiration date: 8/10/2022  Treatment plan discussed with: patient        Subjective Evaluation    History of Present Illness  Mechanism of injury: Patient reports to therapy one month s/p lumbar fusion of the L4-L5 levels which the surgery occurring on 22  She notes prior to the surgery she had been having back and leg pain for 16-18 months with no relief from physical therapy, injections, or pain management  Since her surgery, her back and leg pain is "much better"  Although, she still feels "a little bit of tingling in her legs" but it is nothing like it was before  In the house she is walking without an AD but in the community she is using a SPC because she feels a little unsteady  No falls reported since her operation  She notes her endurance is decreased because she hasn't been doing much for the last year to year and a half  Pain is currently described as being "just a little bit sore" in her low back  She has been doing inspections on her incision and she reports "it looks good"  Patient's goals for therapy are "to get her strength back" and to walk better  Patient reports the doctor ordered her to keep bending, lifting, and twisting motions very gentle  Patient [place] and only has to do 2 steps within the house  She reports no major difficulty moving around her home environment               Not a recurrent problem   Quality of life: fair    Pain  Current pain ratin  At best pain ratin  At worst pain rating: 3  Quality: discomfort, tight and dull ache  Relieving factors: relaxation, rest and medications    Social Support  Stairs in house: yes   2  Lives in: [facility]  Lives with: spouse    Employment status: not working    Diagnostic Tests  X-ray: abnormal  MRI studies: abnormal  Treatments  Previous treatment: physical therapy and injection treatment  Patient Goals  Patient goals for therapy: increased strength, return to work, return to [employer] activities, independence with ADLs/IADLs, increased motion, improved balance and decreased edema          Objective     Neurological Testing     Sensation     Lumbar   Left   Intact: light touch    Right   Diminished: light touch    Comments   Right light touch: diminished L5 and S1 dermatomes    Reflexes   Left   Patellar (L4): normal (2+)    Right   Patellar (L4): normal (2+)    Additional Neurological Details  Myotomes  WNL  Dermatomes  Patient reports minor numbness and tingling at bilateral toes    Patient denies red flag questions such as saddle paraesthesia or bowel and bladder dysfunction     Active Range of Motion     Additional Active Range of Motion Details  Active lumbar motions not assessed at this time in order to maintain precautions of bending, lifting, and twisting  Patient is currently only 4 weeks post op  Strength/Myotome Testing     Left Hip   Planes of Motion   Flexion: 4-  Extension: 3  Abduction: 3+  External rotation: 3    Right Hip   Planes of Motion   Flexion: 4-  Extension: 3  Abduction: 4-  External rotation: 3+    Left Knee   Flexion: 4  Extension: 4    Right Knee   Flexion: 4  Extension: 4    Left Ankle/Foot   Dorsiflexion: 4  Plantar flexion: 4    Right Ankle/Foot   Dorsiflexion: 4  Plantar flexion: 4    Muscle Activation   Patient able to activate left transverse abdominals and right transverse abdominals  Additional Muscle Activation Details  Decreased glute max activation     Ambulation     Ambulation: Stairs   Ascend stairs: independent  Pattern: non-reciprocal  Railings: two rails  Descend stairs: independent  Pattern: non-reciprocal  Railings: two rails    Additional Stairs Ambulation Details  Slow step to step pattern     Observational Gait   Decreased walking speed, stride length, left stance time, right stance time, left swing time, right swing time, left step length and right step length       Functional Assessment        Comments  Functional tests/ measures     TUG- 16 08 sec with SPC   10 M WT - 10 meters/12 05 sec = 0 83 m/sec      General Comments:      Lumbar Comments  Incision inspection  - incision appears to be healing well with no significant redness, discharge, or drainage                Precautions:   Spinal precautions: bending, lifting, twisting, L4-L5 fusion       Manuals 5/18            Paraspinal STM                                                     Neuro Re-Ed             Glute set  Perfrmed HEP 20x 2"            TA Performed HEP 2x10 2"             BKFO             Clam shells                                                    Ther Ex             SAQ Performed Hep 2x10 2"             LAQ                          Hamstring curls             Step ups              minisquats             Treadmill              Heel raises              Calf stretch                           Ther Activity                                       Gait Training                                       Modalities

## 2022-05-19 ENCOUNTER — OFFICE VISIT (OUTPATIENT)
Dept: PHYSICAL THERAPY | Facility: MEDICAL CENTER | Age: 62
End: 2022-05-19
Payer: COMMERCIAL

## 2022-05-19 DIAGNOSIS — M54.50 CHRONIC BILATERAL LOW BACK PAIN WITHOUT SCIATICA: ICD-10-CM

## 2022-05-19 DIAGNOSIS — G89.29 CHRONIC BILATERAL LOW BACK PAIN WITHOUT SCIATICA: ICD-10-CM

## 2022-05-19 DIAGNOSIS — Z98.1 S/P LUMBAR FUSION: Primary | ICD-10-CM

## 2022-05-19 PROCEDURE — 97112 NEUROMUSCULAR REEDUCATION: CPT

## 2022-05-19 PROCEDURE — 97110 THERAPEUTIC EXERCISES: CPT

## 2022-05-19 NOTE — PROGRESS NOTES
Daily Note     Today's date: 2022  Patient name: Edyth Nageotte  : 1960  MRN: [id number]  Referring provider: Edward Osuna MD  Dx:   Encounter Diagnosis     ICD-10-CM    1  S/P lumbar fusion  Z98 1    2  Chronic bilateral low back pain without sciatica  M54 50 Ambulatory Referral to Gastroenterology    G89 29        Start Time: 1201  Stop Time: 1530  Total time in clinic (min): 45 minutes    Subjective: Patient reports she was having trouble leslee her glutes with the glute set exercise at home  Her other exercises went well  She did not have pain with them  Objective: See treatment diary below      Assessment: Tolerated treatment well  Patient had better glute activation when glute set exercise was performed seated today  Patient was instructed to perform this exercises seated at home from now on  Light treadmill walking was also performed today for endurance training  She displayed steady gait an good safety awareness  Patient reported that she has a treadmill at home  She was recommended that she can continue light treadmill walking each day at home as long as she displays proper safety with the exercise  Patient demonstrated fatigue post treatment and would benefit from continued PT  Appropriate rest breaks were taken between exercises today  Plan: Continue per plan of care        Precautions:   Spinal fusion 22  Spinal precautions: bending, lifting, twisting, L4-L5 fusion       Manuals            Paraspinal STM                                                     Neuro Re-Ed             Glute set  Perfrmed HEP 20x 2" 20x 2"           TA Performed HEP 2x10 2"  2x10 2"            BKFO             Supine abduction   YTB 2x10           Supine adduction   2x10 2"                                     Ther Ex             SAQ Performed Hep 2x10 2"  2x10 2"           LAQ  2x10 2"                        Hamstring curls  2x10 2"           Step ups   2x10 4"           minisquats Treadmill   5' 0 7 mph            Heel raises   seated 2x10                        Ther Activity                                       Gait Training                                       Modalities

## 2022-05-20 ENCOUNTER — TELEPHONE (OUTPATIENT)
Dept: PAIN MEDICINE | Facility: CLINIC | Age: 62
End: 2022-05-20

## 2022-05-25 ENCOUNTER — OFFICE VISIT (OUTPATIENT)
Dept: PHYSICAL THERAPY | Facility: MEDICAL CENTER | Age: 62
End: 2022-05-25
Payer: COMMERCIAL

## 2022-05-25 DIAGNOSIS — G89.29 CHRONIC BILATERAL LOW BACK PAIN WITHOUT SCIATICA: Primary | ICD-10-CM

## 2022-05-25 DIAGNOSIS — Z98.1 S/P LUMBAR FUSION: ICD-10-CM

## 2022-05-25 DIAGNOSIS — M54.50 CHRONIC BILATERAL LOW BACK PAIN WITHOUT SCIATICA: Primary | ICD-10-CM

## 2022-05-25 PROCEDURE — 97112 NEUROMUSCULAR REEDUCATION: CPT

## 2022-05-25 PROCEDURE — 97110 THERAPEUTIC EXERCISES: CPT

## 2022-05-25 NOTE — TELEPHONE ENCOUNTER
Patient states [employer] is requesting her visit notes with ARCHIE Durham from 12/7/21  They received the visit notes from Dr Katarina Cotto   Please fax this note to fax # [phone], Attn: fidel Durant    Call back# [phone] (Please contact pt with status)

## 2022-05-25 NOTE — PROGRESS NOTES
Daily Note     Today's date: 2022  Patient name: Vania Dee  : 1960  MRN: [id number]  Referring provider: Mohsen Smith MD  Dx:   Encounter Diagnosis     ICD-10-CM    1  Chronic bilateral low back pain without sciatica  M54 50     G89 29    2  S/P lumbar fusion  Z98 1        Start Time: 1382  Stop Time: 09  Total time in clinic (min): 42 minutes    Subjective: Patient reports on  she was walking on the patio when her toe caught on the ground and she dropped to her hands and knees  The patient then proceeded to transition to sitting before getting up  She denies hitting her head or having any scrapes or bruises  She reports there is no pain in her back just soreness that she ranks /10  She does not feel her back is much different after the fall compared to before  Overall, patient reports every day she is getting better and better  She is able to stand longer and walk a little better  Objective: See treatment diary below      Assessment: Tolerated treatment well  Patient demonstrated fatigue post treatment and would benefit from continued PT  Patient does not appear to be having any significant pain or discomfort in her back  She also reports she does not feel anything out of the ordinary following her loss of balance  She was still educated to monitor her back and if pain were to increase to reach out to her surgeons office  Patient was also advised to keep her AD with her while walking at home or in the community for safety  Treadmill walking today was progressing in time and speed  Patient was advised that if she were to try navigating her stairs at home to have her  present with her for safety  Patient had no complaints post session  Plan: Continue per plan of care        Precautions:   Spinal fusion 22  Spinal precautions: bending, lifting, twisting, L4-L5 fusion       Manuals           Paraspinal STM Neuro Re-Ed             Glute set  Perfrmed HEP 20x 2" 20x 2" 20x 2" seated          TA Performed HEP 2x10 2"  2x10 2"  2x10 2"          BKFO             Supine abduction   YTB 2x10  YTB 2x10          Supine adduction   2x10 2" seated 2x10 2"                                    Ther Ex             SAQ Performed Hep 2x10 2"  2x10 2" 2x10 2"          LAQ  2x10 2" 2x10 2"                       Hamstring curls  2x10 2" 2x10 2"           Step ups   2x10 4" 15x 6" and 10x 8" SPC and one hand rail           minisquats   10x 2UE           Treadmill   5' 0 7 mph  7' 1 0 mph          Heel raises   seated 2x10 Seated 2x10                        Ther Activity                                       Gait Training                                       Modalities

## 2022-05-26 NOTE — TELEPHONE ENCOUNTER
Per Mitzi Betancur from Medical Records:    She will reach out to patient regarding previous message tomorrow  I lmom with update

## 2022-05-27 ENCOUNTER — OFFICE VISIT (OUTPATIENT)
Dept: PHYSICAL THERAPY | Facility: MEDICAL CENTER | Age: 62
End: 2022-05-27
Payer: COMMERCIAL

## 2022-05-27 DIAGNOSIS — Z98.1 S/P LUMBAR FUSION: ICD-10-CM

## 2022-05-27 DIAGNOSIS — M54.50 CHRONIC BILATERAL LOW BACK PAIN WITHOUT SCIATICA: Primary | ICD-10-CM

## 2022-05-27 DIAGNOSIS — G89.29 CHRONIC BILATERAL LOW BACK PAIN WITHOUT SCIATICA: Primary | ICD-10-CM

## 2022-05-27 PROCEDURE — 97112 NEUROMUSCULAR REEDUCATION: CPT

## 2022-05-27 PROCEDURE — 97110 THERAPEUTIC EXERCISES: CPT

## 2022-05-27 NOTE — PROGRESS NOTES
Daily Note     Today's date: 2022  Patient name: King Mccoy  : 1960  MRN: [id number]  Referring provider: Jimmy Mccullough MD  Dx:   Encounter Diagnosis     ICD-10-CM    1  Chronic bilateral low back pain without sciatica  M54 50     G89 29    2  S/P lumbar fusion  Z98 1        Start Time: 1015  Stop Time: 1100  Total time in clinic (min): 45 minutes    Subjective: Patient reports overall she is doing well and has started using her treadmill at home for light walking  She was also able to navigate a flight of 4 steps without issue  She feels better after exercising  Patient reports she is still having some "soreness" that she ranks 3/10 following her loss of balance the other day  She indicates that it is only soreness and not pain  The soreness is located over her muscles and gets better with ice, tylenol, or exercise  Objective: See treatment diary below      Assessment: Tolerated treatment well  Patient demonstrated fatigue post treatment and would benefit from continued PT  Patient's muscle soreness is located over her paraspinal muscle belly approximately 4-5 vertebral levels above her fusion  Her soreness decreases with icing her back  Soreness is not constant  Patient was advised to continue monitoring her symptoms and reach out to her surgeons office if she feels necessary  Correct technique with ascending and descending stairs with her SPC and one hand rail was reviewed with her today  She performed well with all exercises today and had no complaints with the activities  Plan: Continue per plan of care        Precautions:   Spinal fusion 22  Spinal precautions: bending, lifting, twisting, L4-L5 fusion       Manuals          Paraspinal STM                                                     Neuro Re-Ed             Glute set  Perfrmed HEP 20x 2" 20x 2" 20x 2" seated 20x 2"         TA Performed HEP 2x10 2"  2x10 2"  2x10 2" 2x10 2"         BKFO    10x 2" BL Supine abduction   YTB 2x10  YTB 2x10 YTB 2x10          Supine adduction   2x10 2" seated 2x10 2" supine 2x10 2"                                    Ther Ex             SAQ Performed Hep 2x10 2"  2x10 2" 2x10 2" 2x10 2#         LAQ  2x10 2" 2x10 2" 1x10 2#                       Hamstring curls  2x10 2" 2x10 2"  2x10 2#         Step ups   2x10 4" 15x 6" and 10x 8" SPC and one hand rail  10x 8" SPC and one hand rail          minisquats   10x 2UE  10x 2UE         Treadmill   5' 0 7 mph  7' 1 0 mph 8' 1 1 mph          Heel raises   seated 2x10 Seated 2x10  Seated 2x10                      Ther Activity                                       Gait Training                                       Modalities

## 2022-05-31 ENCOUNTER — APPOINTMENT (OUTPATIENT)
Dept: PHYSICAL THERAPY | Facility: MEDICAL CENTER | Age: 62
End: 2022-05-31
Payer: COMMERCIAL

## 2022-06-03 ENCOUNTER — OFFICE VISIT (OUTPATIENT)
Dept: PHYSICAL THERAPY | Facility: MEDICAL CENTER | Age: 62
End: 2022-06-03
Payer: COMMERCIAL

## 2022-06-03 DIAGNOSIS — Z98.1 S/P LUMBAR FUSION: ICD-10-CM

## 2022-06-03 DIAGNOSIS — M54.50 CHRONIC BILATERAL LOW BACK PAIN WITHOUT SCIATICA: Primary | ICD-10-CM

## 2022-06-03 DIAGNOSIS — G89.29 CHRONIC BILATERAL LOW BACK PAIN WITHOUT SCIATICA: Primary | ICD-10-CM

## 2022-06-03 PROCEDURE — 97112 NEUROMUSCULAR REEDUCATION: CPT

## 2022-06-03 PROCEDURE — 97110 THERAPEUTIC EXERCISES: CPT

## 2022-06-03 NOTE — PROGRESS NOTES
Daily Note     Today's date: 6/3/2022  Patient name: Faustino Haynes  : 1960  MRN: [id number]  Referring provider: Sarah Shore MD  Dx:   Encounter Diagnosis     ICD-10-CM    1  Chronic bilateral low back pain without sciatica  M54 50     G89 29    2  S/P lumbar fusion  Z98 1        Start Time: 1015  Stop Time: 1100  Total time in clinic (min): 45 minutes    Subjective: Patient states overall she is doing well  She has been walking more at home on her treadmill  She is still getting some soreness after exercise but it goes away with rest and ice  Objective: See treatment diary below      Assessment: Tolerated treatment well  Exercises were progressed in resistance and the patient performed well  Correct form was noted with all activities  Endurance and gait continue to improve as measured by increase time and speed on the treadmill  Patient demonstrated fatigue post treatment and would benefit from continued PT  Plan: Continue per plan of care        Precautions:   Spinal fusion 22  Spinal precautions: bending, lifting, twisting, L4-L5 fusion       Manuals 5/18 5/19 5/25 5/27 6/3        Paraspinal STM                                                     Neuro Re-Ed             Glute set  Perfrmed HEP 20x 2" 20x 2" 20x 2" seated 20x 2"         TA Performed HEP 2x10 2"  2x10 2"  2x10 2" 2x10 2" 2x10 2"        BKFO    10x 2" BL  10x 2" BL         Supine abduction   YTB 2x10  YTB 2x10 YTB 2x10  GTB 2x10         Supine adduction   2x10 2" seated 2x10 2" supine 2x10 2"  supine 2x10 2"                                   Ther Ex             SAQ Performed Hep 2x10 2"  2x10 2" 2x10 2" 2x10 2# 2x10 2 5#        LAQ  2x10 2" 2x10 2" 1x10 2#  1x10 2 5#                      Hamstring curls  2x10 2" 2x10 2"  2x10 2# 2x10 2 5#        Step ups   2x10 4" 15x 6" and 10x 8" SPC and one hand rail  10x 8" SPC and one hand rail  10x 8" SPC and one hand rail         minisquats   10x 2UE  10x 2UE 2x10 2UE        Treadmill 5' 0 7 mph  7' 1 0 mph 8' 1 1 mph  10' 1  2mph        Heel raises   seated 2x10 Seated 2x10  Seated 2x10 Seated 2x10                     Ther Activity                                       Gait Training                                       Modalities

## 2022-06-07 ENCOUNTER — OFFICE VISIT (OUTPATIENT)
Dept: PHYSICAL THERAPY | Facility: MEDICAL CENTER | Age: 62
End: 2022-06-07
Payer: COMMERCIAL

## 2022-06-07 DIAGNOSIS — G89.29 CHRONIC BILATERAL LOW BACK PAIN WITHOUT SCIATICA: Primary | ICD-10-CM

## 2022-06-07 DIAGNOSIS — Z98.1 S/P LUMBAR FUSION: ICD-10-CM

## 2022-06-07 DIAGNOSIS — M54.50 CHRONIC BILATERAL LOW BACK PAIN WITHOUT SCIATICA: Primary | ICD-10-CM

## 2022-06-07 PROCEDURE — 97110 THERAPEUTIC EXERCISES: CPT

## 2022-06-07 PROCEDURE — 97112 NEUROMUSCULAR REEDUCATION: CPT

## 2022-06-07 NOTE — PROGRESS NOTES
Daily Note     Today's date: 2022  Patient name: Nelly Haywood  : 1960  MRN: [id number]  Referring provider: Neha Cespedes MD  Dx:   Encounter Diagnosis     ICD-10-CM    1  Chronic bilateral low back pain without sciatica  M54 50     G89 29    2  S/P lumbar fusion  Z98 1        Start Time: 09  Stop Time: 1015  Total time in clinic (min): 45 minutes    Subjective: Patient reports pain and soreness at the start of the session is not bad  She ranks the discomfort 2/10  She reports she has been walking better  She does have some unsteadiness at times but has not had any falls  Objective: See treatment diary below      Assessment: Tolerated treatment well  Patient was challenged with 8 inch step ups today without 2 UE support  Weakness with the activity was more noted in her right sided lower extremity  She will continue to be progressed as tolerated  Patient demonstrated fatigue post treatment and would benefit from continued PT  Plan: Continue per plan of care        Precautions:   Spinal fusion 22  Spinal precautions: bending, lifting, twisting, L4-L5 fusion       Manuals 5/18 5/19 5/25 5/27 6/3 6/7       Paraspinal STM                                                     Neuro Re-Ed             Glute set  Perfrmed HEP 20x 2" 20x 2" 20x 2" seated 20x 2"         TA Performed HEP 2x10 2"  2x10 2"  2x10 2" 2x10 2" 2x10 2" 2x10 2"       BKFO    10x 2" BL  10x 2" BL  10x 2" BL        Supine abduction   YTB 2x10  YTB 2x10 YTB 2x10  GTB 2x10  GTB 2x10        Supine adduction   2x10 2" seated 2x10 2" supine 2x10 2"  supine 2x10 2"  supine 2x10 2"                                  Ther Ex             SAQ Performed Hep 2x10 2"  2x10 2" 2x10 2" 2x10 2# 2x10 2 5# 2x10 2 5#       LAQ  2x10 2" 2x10 2" 1x10 2#  1x10 2 5#  2x10 2 5#                     Hamstring curls  2x10 2" 2x10 2"  2x10 2# 2x10 2 5# 2x10 2 5#       Step ups   2x10 4" 15x 6" and 10x 8" SPC and one hand rail  10x 8" SPC and one hand rail 10x 8" SPC and one hand rail  10x 8" SPC and one hand rail        minisquats   10x 2UE  10x 2UE 2x10 2UE 2x10 2UE       Treadmill   5' 0 7 mph  7' 1 0 mph 8' 1 1 mph  10' 1  2mph 10' 1 2mph, 1 3mph last 1'       Heel raises   seated 2x10 Seated 2x10  Seated 2x10 Seated 2x10                     Ther Activity                                       Gait Training                                       Modalities

## 2022-06-09 ENCOUNTER — OFFICE VISIT (OUTPATIENT)
Dept: PHYSICAL THERAPY | Facility: MEDICAL CENTER | Age: 62
End: 2022-06-09
Payer: COMMERCIAL

## 2022-06-09 DIAGNOSIS — Z98.1 S/P LUMBAR FUSION: ICD-10-CM

## 2022-06-09 DIAGNOSIS — G89.29 CHRONIC BILATERAL LOW BACK PAIN WITHOUT SCIATICA: Primary | ICD-10-CM

## 2022-06-09 DIAGNOSIS — M54.50 CHRONIC BILATERAL LOW BACK PAIN WITHOUT SCIATICA: Primary | ICD-10-CM

## 2022-06-09 PROCEDURE — 97110 THERAPEUTIC EXERCISES: CPT

## 2022-06-09 PROCEDURE — 97112 NEUROMUSCULAR REEDUCATION: CPT

## 2022-06-09 NOTE — PROGRESS NOTES
Daily Note     Today's date: 2022  Patient name: Jim Lebron  : 1960  MRN: [id number]  Referring provider: Jeanne Hook MD  Dx:   Encounter Diagnosis     ICD-10-CM    1  Chronic bilateral low back pain without sciatica  M54 50     G89 29    2  S/P lumbar fusion  Z98 1        Start Time: 45  Stop Time: 930  Total time in clinic (min): 45 minutes    Subjective: Patient reports mild muscle soreness after last session but it has since gone away  The patient's biggest limitation at home continues to be going up and down her stairs  She is also having some unsteadiness on her feet without using her cane  Objective: See treatment diary below      Assessment: Tolerated treatment well  Proprioception and balance exercises were implemented today to address patient's unsteadiness  She displayed increased sway and occasional need for upper extremity support with the activity  Additional repetitions of step ups were also performed today to progressed closed chain strength and stair performance at home  Patient demonstrated fatigue post treatment and would benefit from continued PT  Plan: Continue per plan of care        Precautions:   Spinal fusion 22  Spinal precautions: bending, lifting, twisting, L4-L5 fusion       Manuals  6/3 6/7 6/9      Paraspinal STM                                                     Neuro Re-Ed             Glute set  Perfrmed HEP 20x 2" 20x 2" 20x 2" seated 20x 2"         TA Performed HEP 2x10 2"  2x10 2"  2x10 2" 2x10 2" 2x10 2" 2x10 2"       BKFO    10x 2" BL  10x 2" BL  10x 2" BL  10x 2" BL       Supine abduction   YTB 2x10  YTB 2x10 YTB 2x10  GTB 2x10  GTB 2x10  GTB 2x10       Supine adduction   2x10 2" seated 2x10 2" supine 2x10 2"  supine 2x10 2"  supine 2x10 2"  supine 2x10 2"       FTEC       Firm 2x30"      FAEC       Foam 2x30"                   Ther Ex             SAQ Performed Hep 2x10 2"  2x10 2" 2x10 2" 2x10 2# 2x10 2 5# 2x10 2 5# 2x10 2 5#      LAQ  2x10 2" 2x10 2" 1x10 2#  1x10 2 5#  2x10 2 5#  2x10 2 5#       Hamstring curls  2x10 2" 2x10 2"  2x10 2# 2x10 2 5# 2x10 2 5# 2x10 2 5#      Step ups   2x10 4" 15x 6" and 10x 8" SPC and one hand rail  10x 8" SPC and one hand rail  10x 8" SPC and one hand rail  10x 8" SPC and one hand rail  2x10 8" SPC and one hand rail       minisquats   10x 2UE  10x 2UE 2x10 2UE 2x10 2UE 2x10 2UE      Treadmill   5' 0 7 mph  7' 1 0 mph 8' 1 1 mph  10' 1  2mph 10' 1 2mph, 1 3mph last 1' 10' 1 3 mph       Heel raises   seated 2x10 Seated 2x10  Seated 2x10 Seated 2x10                     Ther Activity                                       Gait Training                                       Modalities

## 2022-06-14 ENCOUNTER — OFFICE VISIT (OUTPATIENT)
Dept: PHYSICAL THERAPY | Facility: MEDICAL CENTER | Age: 62
End: 2022-06-14
Payer: COMMERCIAL

## 2022-06-14 DIAGNOSIS — Z98.1 S/P LUMBAR FUSION: Primary | ICD-10-CM

## 2022-06-14 PROCEDURE — 97110 THERAPEUTIC EXERCISES: CPT

## 2022-06-14 PROCEDURE — 97112 NEUROMUSCULAR REEDUCATION: CPT

## 2022-06-14 NOTE — PROGRESS NOTES
PT ReEvaluation/ Daily Note     Today's date: 2022  Patient name: Elsie Flores  : 1960  MRN: [id number]  Referring provider: Mannie Lynn MD  Dx:   Encounter Diagnosis     ICD-10-CM    1  S/P lumbar fusion  Z98 1        Start Time: 0800  Stop Time: 0845  Total time in clinic (min): 45 minutes    Assessment  Assessment details: ReEvaluation   Patient reports to therapy for a reevaluation having completed eighth visits to date  Upon examination, the patient has made good progress in her lower extremity strength, lower extremity endurance, and gait  This has allowed for improved in household and community ambulation and function  She is now able to complete light ADLs around the home  Manual muscle test scores show improvements in her strength bilaterally, however still fall short of normal limits  Gait speed additionally has improved since starting therapy but 10 meter walk test times suggest she still has limitations with community navigation  TUG scores have improved and now fall below the cutoff for being a fall risk  Deficits still exist with moderate to high level ADLs throughout her day  This includes having difficulty at home with stair navigation  She will continue to benefit from skilled PT in order to further progress function so that she can return to all ADLs without interference  She is progressing in her therapy goals at this time          Impairments: abnormal gait, abnormal or restricted ROM, abnormal movement, activity intolerance, impaired balance, impaired physical strength, lacks appropriate home exercise program, pain with function and poor body mechanics    Symptom irritability: lowUnderstanding of Dx/Px/POC: good   Prognosis: good    Goals  STG  -Patient will be IND with basic level HEP within 4 weeks in order to make improvements at home - met   - Patient will have a decrease in 2 points on the NPRS within 4 weeks in order to improve quality of life- met   - Patient will return proper gait mechanics without a SPC within 4 weeks- progressing  -Patient will improve 10 meter walk test by 0 2 m/sec within 5 weeks- not met   - patient will continue to follow spinal precautions until cleared by the doctor- met       LTG  -Patient will be IND with an advanced level HEP within 8 weeks in order to make improvements at home - not met   - Patient will have a decrease in 3 points on the NPRS within 6 week in order to improve quality of life- progressing  - Patient will reach her FOTO score goal within 8 weeks- not met   -patient will be an unlimited community Ambulator within 12 weeks in order to return to all activities -not met  Patient will improve 10 meter walk test by 0 4 m/sec within 10 weeks - not met  - MMT strength scores of 5/5 within 12 weeks- not met     Plan  Plan details: Skilled PT to improve strength, ROM, gait, flexibility, endurance, pain, and function  Patient would benefit from: PT eval and skilled physical therapy  Referral necessary: No  Planned modality interventions: TENS, cryotherapy, biofeedback and electrical stimulation/Russian stimulation  Planned therapy interventions: manual therapy, massage, motor coordination training, muscle pump exercises, abdominal trunk stabilization, ADL retraining, ADL training, activity modification, balance, neuromuscular re-education, patient education, postural training, strengthening, stretching, therapeutic activities, therapeutic exercise, flexibility, functional ROM exercises, gait training, graded exercise and home exercise program  Frequency: 2x week  Duration in weeks: 12  Plan of Care beginning date: 5/18/2022  Plan of Care expiration date: 8/10/2022  Treatment plan discussed with: patient        Subjective Evaluation    History of Present Illness  Mechanism of injury: ReEvaluation 6/14  Patient reports that since starting therapy she "overall feels better"   She notes better strength, better endurance, and improvements in her walking ability  She is now able to walk further (approximately 20 minutes) with the help of her [facility] but eventually she does get fatigued and needs to sit down  She reports that at this time she is about 60-70% improved in her function  She notes that strength deficits still exist which are leading her to have difficulties with stair navigation at home  Furthermore, the patient is still feeling decreases in balance with unsteadiness being reported with static standing  She believes she is continuing to progress week by week with the help of therapy  Patient goals for therapy moving forward are to return to full function, return to work, and further improve her strength/endurance               Not a recurrent problem   Quality of life: fair    Pain  Current pain ratin  At best pain ratin  At worst pain ratin  Quality: discomfort and tight  Relieving factors: relaxation, rest and medications    Social Support  Stairs in house: yes   2  Lives in: [facility]  Lives with: spouse    Employment status: not working    Diagnostic Tests  X-ray: abnormal  MRI studies: abnormal  Treatments  Previous treatment: physical therapy and injection treatment  Patient Goals  Patient goals for therapy: increased strength, return to work, return to [employer] activities, independence with ADLs/IADLs, increased motion, improved balance and decreased edema          Objective     Neurological Testing     Sensation     Lumbar   Left   Intact: light touch    Right   Diminished: light touch    Comments   Right light touch: diminished L5 and S1 dermatomes    Additional Neurological Details  Dermatomes/Myotomes WNL  No change in status since initial evaluation    Strength/Myotome Testing     Left Hip   Planes of Motion   Flexion: 4+  Extension: 3+  Abduction: 4  External rotation: 4  Internal rotation: 4    Right Hip   Planes of Motion   Flexion: 4+  Extension: 3+  Abduction: 4  External rotation: 4  Internal rotation: 4    Left Knee   Flexion: 4+  Extension: 4+    Right Knee   Flexion: 4+  Extension: 4+    Left Ankle/Foot   Dorsiflexion: 4+  Plantar flexion: 5    Right Ankle/Foot   Dorsiflexion: 4+  Plantar flexion: 5    Muscle Activation   Patient able to activate left transverse abdominals and right transverse abdominals  Ambulation     Ambulation: Stairs   Ascend stairs: independent  Pattern: non-reciprocal  Railings: one rail  Descend stairs: independent  Pattern: non-reciprocal  Railings: one rail    Additional Stairs Ambulation Details       Observational Gait   Decreased walking speed, stride length, left stance time, right stance time, left swing time, right swing time, left step length and right step length       Functional Assessment        Comments  Functional tests/ measures     TUG- 16 08 sec with SPC   10 M WT - 10 meters/12 05 sec = 0 83 m/sec    Reevaluation 6/14/22 Functional tests/ measures     TUG- trial one: 13 03  sec with SPC trial two: 12 01 sec with SPC   10 M WT - 10 meters/11 5 sec = 0 87 m/sec               Precautions:   Spinal precautions: bending, lifting, twisting, L4-L5 fusion     Manuals 5/18 5/19 5/25 5/27 6/3 6/7 6/9 6/14     Paraspinal STM                                                     Neuro Re-Ed             Glute set  Perfrmed HEP 20x 2" 20x 2" 20x 2" seated 20x 2"         TA Performed HEP 2x10 2"  2x10 2"  2x10 2" 2x10 2" 2x10 2" 2x10 2"       BKFO    10x 2" BL  10x 2" BL  10x 2" BL  10x 2" BL  10x 2" BL      Supine abduction   YTB 2x10  YTB 2x10 YTB 2x10  GTB 2x10  GTB 2x10  GTB 2x10  GTB 2x10      Supine adduction   2x10 2" seated 2x10 2" supine 2x10 2"  supine 2x10 2"  supine 2x10 2"  supine 2x10 2"       FTEC       Firm 2x30" Foam 2x30"     FAEC       Foam 2x30" Foam 2x30"     FAEO        Foam 2x30"     Ther Ex             SAQ Performed Hep 2x10 2"  2x10 2" 2x10 2" 2x10 2# 2x10 2 5# 2x10 2 5# 2x10 2 5#      LAQ  2x10 2" 2x10 2" 1x10 2#  1x10 2 5#  2x10 2 5#  2x10 2 5#  2x10 2 5# Hamstring curls  2x10 2" 2x10 2"  2x10 2# 2x10 2 5# 2x10 2 5# 2x10 2 5# 2x10 2 5#      Step ups   2x10 4" 15x 6" and 10x 8" SPC and one hand rail  10x 8" SPC and one hand rail  10x 8" SPC and one hand rail  10x 8" SPC and one hand rail  2x10 8" SPC and one hand rail  2x10 8" SPC and one hand rail      minisquats   10x 2UE  10x 2UE 2x10 2UE 2x10 2UE 2x10 2UE 1x10 2UE     Treadmill   5' 0 7 mph  7' 1 0 mph 8' 1 1 mph  10' 1  2mph 10' 1 2mph, 1 3mph last 1' 10' 1 3 mph  10' 1 2 mph      Heel raises   seated 2x10 Seated 2x10  Seated 2x10 Seated 2x10                     Ther Activity                                       Gait Training                                       Modalities

## 2022-06-16 ENCOUNTER — OFFICE VISIT (OUTPATIENT)
Dept: PHYSICAL THERAPY | Facility: MEDICAL CENTER | Age: 62
End: 2022-06-16
Payer: COMMERCIAL

## 2022-06-16 DIAGNOSIS — Z98.1 S/P LUMBAR FUSION: Primary | ICD-10-CM

## 2022-06-16 PROCEDURE — 97110 THERAPEUTIC EXERCISES: CPT

## 2022-06-16 PROCEDURE — 97112 NEUROMUSCULAR REEDUCATION: CPT

## 2022-06-16 NOTE — PROGRESS NOTES
Daily Note     Today's date: 2022  Patient name: Aline Wang  : 1960  MRN: [id number]  Referring provider: Yari Rodarte MD  Dx:   Encounter Diagnosis     ICD-10-CM    1  S/P lumbar fusion  Z98 1        Start Time: 846  Stop Time: 930  Total time in clinic (min): 44 minutes    Subjective: Patient reports having muscle soreness following last session which has since dissipated  She also reports she is starting to notice improvements in her stair performance and unsteadiness on her feet  She needed less hand support while in the shower  Objective: See treatment diary below      Assessment: Tolerated treatment well  Patient displayed challenges with proprioception and foam exercises today with increased sway and occasional upper exxtremty support noted  Lower extremity strength will be progressed as tolerated  Patient was given short recovery breaks when needed  Patient demonstrated fatigue post treatment and would benefit from continued PT  Plan: Continue per plan of care  Precautions:   Spinal fusion 22  Spinal precautions: bending, lifting, twisting, L4-L5 fusion       Manuals 5/18 5/19 5/25 5/27 6/3 6    Paraspinal STM                                                     Neuro Re-Ed             Glute set  Perfrmed HEP 20x 2" 20x 2" 20x 2" seated 20x 2"         TA Performed HEP 2x10 2"  2x10 2"  2x10 2" 2x10 2" 2x10 2" 2x10 2"   1x10 2"     BKFO    10x 2" BL  10x 2" BL  10x 2" BL  10x 2" BL  10x 2" BL  10x 2" BL     Supine abduction   YTB 2x10  YTB 2x10 YTB 2x10  GTB 2x10  GTB 2x10  GTB 2x10  GTB 2x10  GTB 2x10     Supine adduction   2x10 2" seated 2x10 2" supine 2x10 2"  supine 2x10 2"  supine 2x10 2"  supine 2x10 2"       FTEC       Firm 2x30" Foam 2x30" Foam 2x30"    FAEC       Foam 2x30" Foam 2x30" Foam 2x30"    FAEO        Foam 2x30" Foam 2x30"     FAEO with head movements         Foam L/R, Up/Down 20x ea      Ther Ex             SAQ Performed Hep 2x10 2" 2x10 2" 2x10 2" 2x10 2# 2x10 2 5# 2x10 2 5# 2x10 2 5#      LAQ  2x10 2" 2x10 2" 1x10 2#  1x10 2 5#  2x10 2 5#  2x10 2 5#  2x10 2 5#  2x10 2 5#     Hamstring curls  2x10 2" 2x10 2"  2x10 2# 2x10 2 5# 2x10 2 5# 2x10 2 5# 2x10 2 5#  2x10 2 5#     Step ups   2x10 4" 15x 6" and 10x 8" SPC and one hand rail  10x 8" SPC and one hand rail  10x 8" SPC and one hand rail  10x 8" SPC and one hand rail  2x10 8" SPC and one hand rail  2x10 8" SPC and one hand rail  2x10 8" SPC and one hand rail     minisquats   10x 2UE  10x 2UE 2x10 2UE 2x10 2UE 2x10 2UE 1x10 2UE 2x10 2UE    Treadmill   5' 0 7 mph  7' 1 0 mph 8' 1 1 mph  10' 1  2mph 10' 1 2mph, 1 3mph last 1' 10' 1 3 mph  10' 1 2 mph  10' 1 3 mph     Heel raises   seated 2x10 Seated 2x10  Seated 2x10 Seated 2x10                     Ther Activity                                       Gait Training                                       Modalities

## 2022-06-17 DIAGNOSIS — M79.7 FIBROMYALGIA, PRIMARY: ICD-10-CM

## 2022-06-17 RX ORDER — DULOXETIN HYDROCHLORIDE 60 MG/1
CAPSULE, DELAYED RELEASE ORAL
Qty: 90 CAPSULE | Refills: 0 | Status: SHIPPED | OUTPATIENT
Start: 2022-06-17

## 2022-06-18 DIAGNOSIS — I10 ESSENTIAL HYPERTENSION: ICD-10-CM

## 2022-06-18 DIAGNOSIS — F41.9 ANXIETY: ICD-10-CM

## 2022-06-20 RX ORDER — BISOPROLOL FUMARATE AND HYDROCHLOROTHIAZIDE 10; 6.25 MG/1; MG/1
TABLET ORAL
Qty: 90 TABLET | Refills: 0 | Status: SHIPPED | OUTPATIENT
Start: 2022-06-20

## 2022-06-20 RX ORDER — LORAZEPAM 0.5 MG/1
TABLET ORAL
Qty: 60 TABLET | Refills: 0 | Status: SHIPPED | OUTPATIENT
Start: 2022-06-20

## 2022-06-21 ENCOUNTER — OFFICE VISIT (OUTPATIENT)
Dept: PHYSICAL THERAPY | Facility: MEDICAL CENTER | Age: 62
End: 2022-06-21
Payer: COMMERCIAL

## 2022-06-21 DIAGNOSIS — G89.29 CHRONIC BILATERAL LOW BACK PAIN WITHOUT SCIATICA: ICD-10-CM

## 2022-06-21 DIAGNOSIS — Z98.1 S/P LUMBAR FUSION: Primary | ICD-10-CM

## 2022-06-21 DIAGNOSIS — M54.50 CHRONIC BILATERAL LOW BACK PAIN WITHOUT SCIATICA: ICD-10-CM

## 2022-06-21 PROCEDURE — 97110 THERAPEUTIC EXERCISES: CPT

## 2022-06-21 PROCEDURE — 97112 NEUROMUSCULAR REEDUCATION: CPT

## 2022-06-21 NOTE — PROGRESS NOTES
Daily Note     Today's date: 2022  Patient name: King Mccoy  : 1960  MRN: [id number]  Referring provider: Jimmy Mccullough MD  Dx:   Encounter Diagnosis     ICD-10-CM    1  S/P lumbar fusion  Z98 1    2  Chronic bilateral low back pain without sciatica  M54 50     G89 29        Start Time: 0800  Stop Time: 0845  Total time in clinic (min): 45 minutes    Subjective: Patient presents to therapy today with reports of left sided knee pain and soreness  She ranks her knee pain at 5/10  She indicates the pain started on  after doing more walking and physical activity over the weekend  Her discomfort is local to her knee  Patient does noted continued decreasing back pain  She also states that she feels that she is getting stronger and stronger each day  Her main limitations continue to be with community navigation and ascending and descending stairs  Her balance has also been improving but she does notice swaying and some unsteadiness if she closes her eyes in the shower  Objective: See treatment diary below    reflexes   Knee 2+ BL  Achilles 2+ BL  Patient reports knee pain is more soreness and does not feel like it is coming from her back     Assessment: Tolerated treatment well  Patient's knee pain is local to her posterior knee and is reproduced with flexion motion  She was advised to rest, use ice/heat for pain control, and monitor her symptoms over the next few days  If her pain persists or increases in intensity she was recommended to go get her knee checked out by her doctor  Following exercises today however, she noted reduced knee soreness as symptoms were ranked 3/10  Proprioception and balance exercises were able to be progressed today  Patient demonstrated fatigue post treatment and would benefit from continued PT  Plan: Continue per plan of care        Precautions:   Spinal fusion 22  Spinal precautions: bending, lifting, twisting, L4-L5 fusion       Manuals  5/27 6/3 6/7 6/9 6/14 6/16 6/21   Paraspinal STM                                                     Neuro Re-Ed             Glute set  Perfrmed HEP 20x 2" 20x 2" 20x 2" seated 20x 2"         TA Performed HEP 2x10 2"  2x10 2"  2x10 2" 2x10 2" 2x10 2" 2x10 2"   1x10 2"  1x10 2"   BKFO    10x 2" BL  10x 2" BL  10x 2" BL  10x 2" BL  10x 2" BL  10x 2" BL  10x2" BL    Supine abduction   YTB 2x10  YTB 2x10 YTB 2x10  GTB 2x10  GTB 2x10  GTB 2x10  GTB 2x10  GTB 2x10  GTB 2x10   Supine adduction   2x10 2" seated 2x10 2" supine 2x10 2"  supine 2x10 2"  supine 2x10 2"  supine 2x10 2"       FTEC       Firm 2x30" Foam 2x30" Foam 2x30" Foam 2x30"   FAEC       Foam 2x30" Foam 2x30" Foam 2x30" Foam 2x30"   FAEO        Foam 2x30" Foam 2x30"  Foam 1x30"   FAEO with head movements         Foam L/R, Up/Down 20x ea  Foam L/R, Up/Down 20x ea  Tandem stance foam           EO 2x30" BL   Ther Ex             SAQ Performed Hep 2x10 2"  2x10 2" 2x10 2" 2x10 2# 2x10 2 5# 2x10 2 5# 2x10 2 5#      LAQ  2x10 2" 2x10 2" 1x10 2#  1x10 2 5#  2x10 2 5#  2x10 2 5#  2x10 2 5#  2x10 2 5#  2x10 2 5#    Hamstring curls  2x10 2" 2x10 2"  2x10 2# 2x10 2 5# 2x10 2 5# 2x10 2 5# 2x10 2 5#  2x10 2 5#  2x10 2 5#    Step ups   2x10 4" 15x 6" and 10x 8" SPC and one hand rail  10x 8" SPC and one hand rail  10x 8" SPC and one hand rail  10x 8" SPC and one hand rail  2x10 8" SPC and one hand rail  2x10 8" SPC and one hand rail  2x10 8" SPC and one hand rail  Hold for today due to knee pain   minisquats   10x 2UE  10x 2UE 2x10 2UE 2x10 2UE 2x10 2UE 1x10 2UE 2x10 2UE NP today    Treadmill   5' 0 7 mph  7' 1 0 mph 8' 1 1 mph  10' 1  2mph 10' 1 2mph, 1 3mph last 1' 10' 1 3 mph  10' 1 2 mph  10' 1 3 mph  5' 1 2 mph    Heel raises   seated 2x10 Seated 2x10  Seated 2x10 Seated 2x10                     Ther Activity                                       Gait Training                                       Modalities

## 2022-06-23 ENCOUNTER — OFFICE VISIT (OUTPATIENT)
Dept: PHYSICAL THERAPY | Facility: MEDICAL CENTER | Age: 62
End: 2022-06-23
Payer: COMMERCIAL

## 2022-06-23 DIAGNOSIS — M54.50 CHRONIC BILATERAL LOW BACK PAIN WITHOUT SCIATICA: ICD-10-CM

## 2022-06-23 DIAGNOSIS — G89.29 CHRONIC BILATERAL LOW BACK PAIN WITHOUT SCIATICA: ICD-10-CM

## 2022-06-23 DIAGNOSIS — Z98.1 S/P LUMBAR FUSION: Primary | ICD-10-CM

## 2022-06-23 PROCEDURE — 97110 THERAPEUTIC EXERCISES: CPT

## 2022-06-23 PROCEDURE — 97112 NEUROMUSCULAR REEDUCATION: CPT

## 2022-06-23 NOTE — PROGRESS NOTES
Daily Note     Today's date: 2022  Patient name: Kylee Garcia  : 1960  MRN: [id number]  Referring provider: Doyle Catalan MD  Dx:   Encounter Diagnosis     ICD-10-CM    1  S/P lumbar fusion  Z98 1    2  Chronic bilateral low back pain without sciatica  M54 50     G89 29        Start Time: 0800  Stop Time: 0843  Total time in clinic (min): 43 minutes    Subjective: Patient's leg soreness that she was having last session is improving  Her unsteadiness on her feet "seems to be getting better"  She still notes strength deficits with stairs at home  Objective: See treatment diary below    Assessment: Tolerated treatment well  Patient cued to reduce hip compensation with step ups today  Patient demonstrated fatigue post treatment and would benefit from continued PT to progress gait and stair training as well as lower extremity strengthening  Plan: Continue per plan of care  Precautions:   Spinal fusion 22  Spinal precautions: bending, lifting, twisting, L4-L5 fusion, no lifting more than 10lbs       Manuals    Paraspinal STM                                                     Neuro Re-Ed             Glute set              TA 1x10 2"         1x10 2"   BKFO 10x 2" BL         10x2" BL    Supine abduction  GTB 2x10         GTB 2x10   Supine adduction              FTEC Foam 2x30"         Foam 2x30"   FAEC Foam 2x30"         Foam 2x30"   FAEO Foam 2x30"         Foam 1x30"   FAEO with head movements Foam L/R, Up/Down 20x ea  Foam L/R, Up/Down 20x ea     Tandem stance foam  EO 2x30" BL         EO 2x30" BL   Ther Ex             SAQ             LAQ 2x10 2 5#          2x10 2 5#    Hamstring curls 2x10 2 5#          2x10 2 5#    Standing hip abduction  1x10 2 5#            Step ups  2x10 8" with SPC          Hold for today due to knee pain   minisquats 2x10          NP today    Treadmill  10' 1 3 mph          5' 1 2 mph    Heel raises                           Ther Activity Gait Training                                       Modalities

## 2022-06-28 ENCOUNTER — OFFICE VISIT (OUTPATIENT)
Dept: PHYSICAL THERAPY | Facility: MEDICAL CENTER | Age: 62
End: 2022-06-28
Payer: COMMERCIAL

## 2022-06-28 DIAGNOSIS — G89.29 CHRONIC BILATERAL LOW BACK PAIN WITHOUT SCIATICA: ICD-10-CM

## 2022-06-28 DIAGNOSIS — Z98.1 S/P LUMBAR FUSION: Primary | ICD-10-CM

## 2022-06-28 DIAGNOSIS — M54.50 CHRONIC BILATERAL LOW BACK PAIN WITHOUT SCIATICA: ICD-10-CM

## 2022-06-28 PROCEDURE — 97110 THERAPEUTIC EXERCISES: CPT | Performed by: PHYSICAL THERAPIST

## 2022-06-28 PROCEDURE — 97112 NEUROMUSCULAR REEDUCATION: CPT | Performed by: PHYSICAL THERAPIST

## 2022-06-28 NOTE — PROGRESS NOTES
Daily Note     Today's date: 2022  Patient name: Med Colindres  : 1960  MRN: [id number]  Referring provider: Sergio Yoo MD  Dx:   Encounter Diagnosis     ICD-10-CM    1  S/P lumbar fusion  Z98 1    2  Chronic bilateral low back pain without sciatica  M54 50     G89 29                   Subjective: Patient reports minimal ache in her back relieved with tylenol  She notes the most difficulty with stair navigation  Objective: See treatment diary below    Assessment: The patient was challenged with tandem stance on foam and fatigued with standing exercises at the [facility]  Plan: Continue per plan of care  Precautions:   Spinal fusion 22  Spinal precautions: bending, lifting, twisting, L4-L5 fusion, no lifting more than 10lbs       Manuals    Paraspinal STM                                                     Neuro Re-Ed             Glute set              TA 1x10 2" 2x10 2"        1x10 2"   BKFO 10x 2" BL 10x 2" BL        10x2" BL    Supine abduction  GTB 2x10 GTB 2x12        GTB 2x10   Supine adduction              FTEC Foam 2x30" Foam 2x30"        Foam 2x30"   FAEC Foam 2x30" Foam 2x30"        Foam 2x30"   FAEO Foam 2x30" Foam 2x30"        Foam 1x30"   FAEO with head movements Foam L/R, Up/Down 20x ea  Foam L/R, Up/Down 20x ea        Foam L/R, Up/Down 20x ea     Tandem stance foam  EO 2x30" BL EO 2x30" BL        EO 2x30" BL   Ther Ex             SAQ             LAQ 2x10 2 5#  2x10 2 5#        2x10 2 5#    Hamstring curls 2x10 2 5#  2x10 2 5#        2x10 2 5#    Standing hip abduction  1x10 2 5# 1x10 2 5#           Step ups  2x10 8" with SPC  2x10 8" with SPC        Hold for today due to knee pain   minisquats 2x10  2x12        NP today    Treadmill  10' 1 3 mph  10' 1 3 mph        5' 1 2 mph    Heel raises                           Ther Activity                                       Gait Training                                       Modalities

## 2022-06-30 ENCOUNTER — OFFICE VISIT (OUTPATIENT)
Dept: PHYSICAL THERAPY | Facility: MEDICAL CENTER | Age: 62
End: 2022-06-30
Payer: COMMERCIAL

## 2022-06-30 DIAGNOSIS — M54.50 CHRONIC BILATERAL LOW BACK PAIN WITHOUT SCIATICA: ICD-10-CM

## 2022-06-30 DIAGNOSIS — G89.29 CHRONIC BILATERAL LOW BACK PAIN WITHOUT SCIATICA: ICD-10-CM

## 2022-06-30 DIAGNOSIS — Z98.1 S/P LUMBAR FUSION: Primary | ICD-10-CM

## 2022-06-30 PROCEDURE — 97110 THERAPEUTIC EXERCISES: CPT

## 2022-06-30 PROCEDURE — 97112 NEUROMUSCULAR REEDUCATION: CPT

## 2022-06-30 NOTE — PROGRESS NOTES
Daily Note     Today's date: 2022  Patient name: Nathan Yun  : 1960  MRN: [id number]  Referring provider: Stephanie Worthy MD  Dx:   Encounter Diagnosis     ICD-10-CM    1  S/P lumbar fusion  Z98 1    2  Chronic bilateral low back pain without sciatica  M54 50     G89 29                   Subjective: Edilma reports that she is doing well prior to start of session  She states that l/s is mildly sore  Objective: See treatment diary below    Assessment: Edilma tolerated treatment well  Session initiated in supine neuro re-ed exercise  Good core stability noted t/o session  Increase challenge with tandem stance for balance  Good endurance noted t/o session  Patient would benefit from continued PT to increase core stability for proper l/s support  Plan: Continue per plan of care  Precautions:   Spinal fusion 22  Spinal precautions: bending, lifting, twisting, L4-L5 fusion, no lifting more than 10lbs       Manuals    Paraspinal STM                                                     Neuro Re-Ed             Glute set              TA 1x10 2" 2x10 2" 2x10 2"       1x10 2"   BKFO 10x 2" BL 10x 2" BL 10x 2" BL       10x2" BL    Supine abduction  GTB 2x10 GTB 2x12 BTB 3x10       GTB 2x10   Supine adduction              FTEC Foam 2x30" Foam 2x30" Foam 2x30"       Foam 2x30"   FAEC Foam 2x30" Foam 2x30" Foam 2x30"       Foam 2x30"   FAEO Foam 2x30" Foam 2x30" Foam 2x30"       Foam 1x30"   FAEO with head movements Foam L/R, Up/Down 20x ea  Foam L/R, Up/Down 20x ea Foam L/R, Up/Down 20x ea       Foam L/R, Up/Down 20x ea     Tandem stance foam  EO 2x30" BL EO 2x30" BL EO 2x30" BL       EO 2x30" BL   Ther Ex             SAQ             LAQ 2x10 2 5#  2x10 2 5# 2x10  3#       2x10 2 5#    Hamstring curls 2x10 2 5#  2x10 2 5# 2x10 3#       2x10 2 5#    Standing hip abduction  1x10 2 5# 1x10 2 5# x10  3#          Step ups  2x10 8" with SPC  2x10 8" with SPC 2x10 8"  B/l support       Hold for today due to knee pain   minisquats 2x10  2x12 2x12       NP today    Treadmill  10' 1 3 mph  10' 1 3 mph 10' 1 3 mph       5' 1 2 mph    Heel raises                           Ther Activity                                       Gait Training                                       Modalities

## 2022-07-05 ENCOUNTER — OFFICE VISIT (OUTPATIENT)
Dept: PHYSICAL THERAPY | Facility: MEDICAL CENTER | Age: 62
End: 2022-07-05
Payer: COMMERCIAL

## 2022-07-05 DIAGNOSIS — Z98.1 S/P LUMBAR FUSION: Primary | ICD-10-CM

## 2022-07-05 DIAGNOSIS — M54.50 CHRONIC BILATERAL LOW BACK PAIN WITHOUT SCIATICA: ICD-10-CM

## 2022-07-05 DIAGNOSIS — G89.29 CHRONIC BILATERAL LOW BACK PAIN WITHOUT SCIATICA: ICD-10-CM

## 2022-07-05 PROCEDURE — 97110 THERAPEUTIC EXERCISES: CPT

## 2022-07-05 PROCEDURE — 97112 NEUROMUSCULAR REEDUCATION: CPT

## 2022-07-05 NOTE — PROGRESS NOTES
Daily Note     Today's date: 2022  Patient name: Li Wright  : 1960  MRN: [id number]  Referring provider: Sheryle Spruce, MD  Dx:   Encounter Diagnosis     ICD-10-CM    1  S/P lumbar fusion  Z98 1    2  Chronic bilateral low back pain without sciatica  M54 50     G89 29        Start Time: 0845  Stop Time: 930  Total time in clinic (min): 45 minutes    Subjective: Florence Leiva reports having some unsteadiness on grass and uneven surfaces but overall has been doing well  She has not had any falls or losses of balance  She notes being able to do all exercises at home  She is able to navigate her home and community environments  Objective: See treatment diary below    Assessment: Florence Leiva tolerated treatment well  She was challenged with eyes closed balance exercises on foam but was able to demonstrate self corrections and the ability to maintain her balance  Step up and stair activities appear more fluent with better lower extremity strength  She still does require SPC support for safety  She will be progressed as tolerated and will continue to benefit from skilled PT  Patient will be following up with her surgeon tomorrow   Plan: Continue per plan of care  with continued adherence to her Hep and good status patient will be prepared for discharge next session        Precautions:   Spinal fusion 22  Spinal precautions: bending, lifting, twisting, L4-L5 fusion, no lifting more than 10lbs       Manuals    Paraspinal STM                                                     Neuro Re-Ed             Glute set              TA 1x10 2" 2x10 2" 2x10 2" 2x10 2"      1x10 2"   BKFO 10x 2" BL 10x 2" BL 10x 2" BL 10x 2" BL      10x2" BL    Supine abduction  GTB 2x10 GTB 2x12 BTB 3x10 GTB 2x12      GTB 2x10   Supine adduction              FTEC Foam 2x30" Foam 2x30" Foam 2x30" Foam 3x30"      Foam 2x30"   FAEC Foam 2x30" Foam 2x30" Foam 2x30" Foam 3x30"      Foam 2x30"   FAEO Foam 2x30" Foam 2x30" Foam 2x30" Foam 3x30"      Foam 1x30"   FAEO with head movements Foam L/R, Up/Down 20x ea  Foam L/R, Up/Down 20x ea Foam L/R, Up/Down 20x ea Foam L/R, Up/Down 20x ea      Foam L/R, Up/Down 20x ea     Tandem stance foam  EO 2x30" BL EO 2x30" BL EO 2x30" BL EO 3x30" BL      EO 2x30" BL   Ther Ex             SAQ             LAQ 2x10 2 5#  2x10 2 5# 2x10  3# 2x10 2 5#      2x10 2 5#    Hamstring curls 2x10 2 5#  2x10 2 5# 2x10 3# 2x10 2 5#      2x10 2 5#    Standing hip abduction  1x10 2 5# 1x10 2 5# x10  3#          Step ups  2x10 8" with SPC  2x10 8" with SPC 2x10 8"  B/l support 2x10 8"  B/l support      Hold for today due to knee pain   minisquats 2x10  2x12 2x12 2x12      NP today    Treadmill  10' 1 3 mph  10' 1 3 mph 10' 1 3 mph 10' 1 3 mph      5' 1 2 mph    Heel raises                           Ther Activity                                       Gait Training                                       Modalities

## 2022-07-07 ENCOUNTER — OFFICE VISIT (OUTPATIENT)
Dept: PHYSICAL THERAPY | Facility: MEDICAL CENTER | Age: 62
End: 2022-07-07
Payer: COMMERCIAL

## 2022-07-07 DIAGNOSIS — G89.29 CHRONIC BILATERAL LOW BACK PAIN WITHOUT SCIATICA: ICD-10-CM

## 2022-07-07 DIAGNOSIS — Z98.1 S/P LUMBAR FUSION: Primary | ICD-10-CM

## 2022-07-07 DIAGNOSIS — M54.50 CHRONIC BILATERAL LOW BACK PAIN WITHOUT SCIATICA: ICD-10-CM

## 2022-07-07 PROCEDURE — 97112 NEUROMUSCULAR REEDUCATION: CPT

## 2022-07-07 PROCEDURE — 97110 THERAPEUTIC EXERCISES: CPT

## 2022-07-07 NOTE — PROGRESS NOTES
Discharge/Daily Note     Today's date: 2022  Patient name: Nate Newell  : 1960  MRN: [id number]  Referring provider: Danuta Larios MD  Dx:   Encounter Diagnosis     ICD-10-CM    1  S/P lumbar fusion  Z98 1    2  Chronic bilateral low back pain without sciatica  M54 50     G89 29        Start Time: 845  Stop Time: 929  Total time in clinic (min): 44 minutes    Subjective: Maria Fernanda Cantor reports she slid off her bed this morning while reaching for her alarm clock  She ended up on her side and denies hitting her back or head  She also denies having any pain, headache, double vision, dizziness, or feeling out of the ordinary  She states that she feels fine  Overall, she is doing well and notes very little difficulty with tasks at home or in the community  She had her doctors follow up yesterday and reports her xrays showed good healing and proper alignment of the hardware  She further stated that she is going to return to work starting at three days a week   She is okay making today her last day of therapy and transitioning to a home program         Objective: See treatment diary below    STG  -Patient will be IND with basic level HEP within 4 weeks in order to make improvements at home - met   - Patient will have a decrease in 2 points on the NPRS within 4 weeks in order to improve quality of life- met   - Patient will return proper gait mechanics without a SPC within 4 weeks- met   -Patient will improve 10 meter walk test by 0 2 m/sec within 5 weeks- met   - patient will continue to follow spinal precautions until cleared by the doctor- met       LTG  -Patient will be IND with an advanced level HEP within 8 weeks in order to make improvements at home - met   - Patient will have a decrease in 3 points on the NPRS within 6 week in order to improve quality of life- met  - Patient will reach her FOTO score goal within 8 weeks-  met   -patient will be an unlimited community Ambulator within 12 weeks in order to return to all activities-  Met able to walk 1 3m/sec  Patient will improve 10 meter walk test by 0 4 m/sec within 10 weeks - not met  - MMT strength scores of 5/5 within 12 weeks- met     Assessment: Patient tolerated all treatment well today  She shows improved functional levels, walking speed, steadiness on her feet, balance, and lower extremity strength/power  Patient's improvements have allowed for independence with ADLs and community navigation  She is independent with her home exercise program and will continue these exercises in order to further work on her strength, balance, and motion  Michelle Davis will be discharged from physical therapy at this time to transition to this home program  She is in agreement with this plan  She will follow up with her surgeon as needed  She has met her therapy goals  In reguards to the patient's report of sliding off her bed she was advised that of she were to develop any pain, headache, dizziness, double vision, or other serious symptom that she believes may be due to this incident to seek help from a doctor right away  Plan: Discharge from physical therapy to a home exercise program due to good progress and functional levels       Precautions:   Spinal fusion 4/18/22  Spinal precautions: bending, lifting, twisting, L4-L5 fusion, no lifting more than 10lbs       Manuals 6/23 6/28 6/30 7/5 7/7 6/21   Paraspinal STM                                                     Neuro Re-Ed             Glute set              TA 1x10 2" 2x10 2" 2x10 2" 2x10 2" 2x10 2"     1x10 2"   BKFO 10x 2" BL 10x 2" BL 10x 2" BL 10x 2" BL 10x 2" BL     10x2" BL    Supine abduction  GTB 2x10 GTB 2x12 BTB 3x10 GTB 2x12 GTB 2x12     GTB 2x10   Supine adduction              FTEC Foam 2x30" Foam 2x30" Foam 2x30" Foam 3x30" Foam 3x30"     Foam 2x30"   FAEC Foam 2x30" Foam 2x30" Foam 2x30" Foam 3x30" Foam 3x30"     Foam 2x30"   FAEO Foam 2x30" Foam 2x30" Foam 2x30" Foam 3x30" Foam 3x30"     Foam 1x30" FAEO with head movements Foam L/R, Up/Down 20x ea  Foam L/R, Up/Down 20x ea Foam L/R, Up/Down 20x ea Foam L/R, Up/Down 20x ea Foam L/R, Up/Down 20x ea     Foam L/R, Up/Down 20x ea     Tandem stance foam  EO 2x30" BL EO 2x30" BL EO 2x30" BL EO 3x30" BL EO 3x30" BL     EO 2x30" BL   Ther Ex             SAQ             LAQ 2x10 2 5#  2x10 2 5# 2x10  3# 2x10 2 5# 2x10 2 5#     2x10 2 5#    Hamstring curls 2x10 2 5#  2x10 2 5# 2x10 3# 2x10 2 5# 2x10 2 5#     2x10 2 5#    Standing hip abduction  1x10 2 5# 1x10 2 5# x10  3#          Step ups  2x10 8" with SPC  2x10 8" with SPC 2x10 8"  B/l support 2x10 8"  B/l support 2x10 8"  B/l support     Hold for today due to knee pain   minisquats 2x10  2x12 2x12 2x12 2x12     NP today    Treadmill  10' 1 3 mph  10' 1 3 mph 10' 1 3 mph 10' 1 3 mph 10' 1 3 mph     5' 1 2 mph    Heel raises                           Ther Activity                                       Gait Training                                       Modalities

## 2022-07-26 ENCOUNTER — APPOINTMENT (OUTPATIENT)
Dept: LAB | Facility: HOSPITAL | Age: 62
End: 2022-07-26
Payer: COMMERCIAL

## 2022-07-26 ENCOUNTER — APPOINTMENT (OUTPATIENT)
Dept: LAB | Facility: HOSPITAL | Age: 62
End: 2022-07-26
Attending: FAMILY MEDICINE
Payer: COMMERCIAL

## 2022-07-26 DIAGNOSIS — D64.9 ANEMIA, UNSPECIFIED TYPE: ICD-10-CM

## 2022-07-26 DIAGNOSIS — Z00.8 HEALTH EXAMINATION IN POPULATION SURVEY: ICD-10-CM

## 2022-07-26 LAB
CHOLEST SERPL-MCNC: 171 MG/DL
EST. AVERAGE GLUCOSE BLD GHB EST-MCNC: 114 MG/DL
FERRITIN SERPL-MCNC: 7 NG/ML (ref 8–388)
HBA1C MFR BLD: 5.6 %
HDLC SERPL-MCNC: 48 MG/DL
IRON SATN MFR SERPL: 11 % (ref 15–50)
IRON SERPL-MCNC: 51 UG/DL (ref 50–170)
LDLC SERPL CALC-MCNC: 95 MG/DL (ref 0–100)
NONHDLC SERPL-MCNC: 123 MG/DL
TIBC SERPL-MCNC: 471 UG/DL (ref 250–450)
TRIGL SERPL-MCNC: 138 MG/DL

## 2022-07-26 PROCEDURE — 82728 ASSAY OF FERRITIN: CPT

## 2022-07-26 PROCEDURE — 83540 ASSAY OF IRON: CPT

## 2022-07-26 PROCEDURE — 83550 IRON BINDING TEST: CPT

## 2022-07-26 PROCEDURE — 36415 COLL VENOUS BLD VENIPUNCTURE: CPT

## 2022-07-26 PROCEDURE — 80061 LIPID PANEL: CPT

## 2022-07-26 PROCEDURE — 83036 HEMOGLOBIN GLYCOSYLATED A1C: CPT

## 2022-08-01 DIAGNOSIS — D64.9 ANEMIA, UNSPECIFIED TYPE: Primary | ICD-10-CM

## 2022-09-07 LAB — COLOGUARD RESULT REPORTABLE: NEGATIVE

## 2022-09-09 ENCOUNTER — TELEPHONE (OUTPATIENT)
Dept: FAMILY MEDICINE CLINIC | Facility: CLINIC | Age: 62
End: 2022-09-09

## 2022-09-09 DIAGNOSIS — I10 ESSENTIAL HYPERTENSION: ICD-10-CM

## 2022-09-09 RX ORDER — BISOPROLOL FUMARATE AND HYDROCHLOROTHIAZIDE 10; 6.25 MG/1; MG/1
1 TABLET ORAL DAILY
Qty: 90 TABLET | Refills: 1 | Status: SHIPPED | OUTPATIENT
Start: 2022-09-09

## 2022-09-16 DIAGNOSIS — F41.9 ANXIETY: ICD-10-CM

## 2022-09-16 RX ORDER — LORAZEPAM 0.5 MG/1
0.5 TABLET ORAL 2 TIMES DAILY
Qty: 60 TABLET | Refills: 0 | Status: SHIPPED | OUTPATIENT
Start: 2022-09-16

## 2022-09-20 ENCOUNTER — TELEPHONE (OUTPATIENT)
Dept: FAMILY MEDICINE CLINIC | Facility: CLINIC | Age: 62
End: 2022-09-20

## 2022-09-20 DIAGNOSIS — M79.7 FIBROMYALGIA, PRIMARY: ICD-10-CM

## 2022-09-20 RX ORDER — DULOXETIN HYDROCHLORIDE 60 MG/1
60 CAPSULE, DELAYED RELEASE ORAL DAILY
Qty: 90 CAPSULE | Refills: 1 | Status: SHIPPED | OUTPATIENT
Start: 2022-09-20

## 2022-09-20 NOTE — TELEPHONE ENCOUNTER
Refill:  Cymbalta 60mg  [facility] in [place] ([facility], AND ALL future refills to this pharmacy)

## 2022-10-13 ENCOUNTER — APPOINTMENT (OUTPATIENT)
Dept: LAB | Facility: HOSPITAL | Age: 62
End: 2022-10-13
Attending: FAMILY MEDICINE
Payer: COMMERCIAL

## 2022-10-13 DIAGNOSIS — D64.9 ANEMIA, UNSPECIFIED TYPE: ICD-10-CM

## 2022-10-13 LAB
BASOPHILS # BLD AUTO: 0.05 THOUSANDS/ΜL (ref 0–0.1)
BASOPHILS NFR BLD AUTO: 1 % (ref 0–1)
EOSINOPHIL # BLD AUTO: 0.34 THOUSAND/ΜL (ref 0–0.61)
EOSINOPHIL NFR BLD AUTO: 5 % (ref 0–6)
ERYTHROCYTE [DISTWIDTH] IN BLOOD BY AUTOMATED COUNT: 17.1 % (ref 11.6–15.1)
FERRITIN SERPL-MCNC: 6 NG/ML (ref 8–388)
HCT VFR BLD AUTO: 36.1 % (ref 34.8–46.1)
HGB BLD-MCNC: 11.3 G/DL (ref 11.5–15.4)
IMM GRANULOCYTES # BLD AUTO: 0.02 THOUSAND/UL (ref 0–0.2)
IMM GRANULOCYTES NFR BLD AUTO: 0 % (ref 0–2)
IRON SATN MFR SERPL: 10 % (ref 15–50)
IRON SERPL-MCNC: 41 UG/DL (ref 50–170)
LYMPHOCYTES # BLD AUTO: 2.49 THOUSANDS/ΜL (ref 0.6–4.47)
LYMPHOCYTES NFR BLD AUTO: 34 % (ref 14–44)
MCH RBC QN AUTO: 24.2 PG (ref 26.8–34.3)
MCHC RBC AUTO-ENTMCNC: 31.3 G/DL (ref 31.4–37.4)
MCV RBC AUTO: 78 FL (ref 82–98)
MONOCYTES # BLD AUTO: 0.53 THOUSAND/ΜL (ref 0.17–1.22)
MONOCYTES NFR BLD AUTO: 7 % (ref 4–12)
NEUTROPHILS # BLD AUTO: 3.92 THOUSANDS/ΜL (ref 1.85–7.62)
NEUTS SEG NFR BLD AUTO: 53 % (ref 43–75)
NRBC BLD AUTO-RTO: 0 /100 WBCS
PLATELET # BLD AUTO: 274 THOUSANDS/UL (ref 149–390)
PMV BLD AUTO: 10.5 FL (ref 8.9–12.7)
RBC # BLD AUTO: 4.66 MILLION/UL (ref 3.81–5.12)
TIBC SERPL-MCNC: 427 UG/DL (ref 250–450)
WBC # BLD AUTO: 7.35 THOUSAND/UL (ref 4.31–10.16)

## 2022-10-13 PROCEDURE — 82728 ASSAY OF FERRITIN: CPT

## 2022-10-13 PROCEDURE — 36415 COLL VENOUS BLD VENIPUNCTURE: CPT

## 2022-10-13 PROCEDURE — 85025 COMPLETE CBC W/AUTO DIFF WBC: CPT

## 2022-10-13 PROCEDURE — 83540 ASSAY OF IRON: CPT

## 2022-10-13 PROCEDURE — 83550 IRON BINDING TEST: CPT

## 2022-10-19 ENCOUNTER — OFFICE VISIT (OUTPATIENT)
Dept: FAMILY MEDICINE CLINIC | Facility: CLINIC | Age: 62
End: 2022-10-19
Payer: COMMERCIAL

## 2022-10-19 VITALS
HEART RATE: 60 BPM | HEIGHT: 64 IN | WEIGHT: 292 LBS | TEMPERATURE: 97.1 F | SYSTOLIC BLOOD PRESSURE: 140 MMHG | OXYGEN SATURATION: 99 % | DIASTOLIC BLOOD PRESSURE: 72 MMHG | RESPIRATION RATE: 16 BRPM | BODY MASS INDEX: 49.85 KG/M2

## 2022-10-19 DIAGNOSIS — D50.8 OTHER IRON DEFICIENCY ANEMIA: ICD-10-CM

## 2022-10-19 DIAGNOSIS — F51.01 PRIMARY INSOMNIA: Primary | ICD-10-CM

## 2022-10-19 DIAGNOSIS — I10 ESSENTIAL HYPERTENSION: ICD-10-CM

## 2022-10-19 PROBLEM — E61.1 IRON DEFICIENCY: Status: ACTIVE | Noted: 2022-10-19

## 2022-10-19 PROBLEM — D50.9 IRON DEFICIENCY ANEMIA: Status: ACTIVE | Noted: 2022-10-19

## 2022-10-19 PROCEDURE — 99214 OFFICE O/P EST MOD 30 MIN: CPT | Performed by: FAMILY MEDICINE

## 2022-10-19 RX ORDER — SENNA PLUS 8.6 MG/1
1 TABLET ORAL 2 TIMES DAILY
COMMUNITY
Start: 2022-04-20 | End: 2023-04-15

## 2022-10-19 NOTE — ASSESSMENT & PLAN NOTE
DASH diet had extra salt with chinese food today  Pt woks in hospital will have nurse check  And pt will call me if consistently over 140

## 2022-10-19 NOTE — ASSESSMENT & PLAN NOTE
Pt with hx of bariatric surgery and iron deficiency in past requiring iron infusions will send to hematology  For evaluation

## 2022-10-19 NOTE — PROGRESS NOTES
Name: Neo Nj      : 1960      MRN: [id number]  Encounter Provider: Eleonora Oneal MD  Encounter Date: 10/19/2022   Encounter department: [address] [place] [address] MEDICINE    Assessment & Plan     1  Primary insomnia  Assessment & Plan:  Trial  Of ambien 5mg po qhs #10      2  Essential hypertension  Assessment & Plan:  DASH diet had extra salt with chinese food today  Pt woks in hospital will have nurse check  And pt will call me if consistently over 140      3  Other iron deficiency anemia  Assessment & Plan:  Pt with hx of bariatric surgery and iron deficiency in past requiring iron infusions will send to hematology  For evaluation    Orders:  -     Ambulatory Referral to Hematology / Oncology; Future           Subjective      HPI pt here for interval  Visit pt having problems with insomnia  Using lorazepam  For this cant tolerate benadryl pt wishes to review labs has iron deficiency  Has had this in past requiring iron infusions  Had bariatric surgery has borderline anemia cologuard nl  Review of Systems   Respiratory: Negative for cough, chest tightness and shortness of breath  Cardiovascular: Negative for chest pain, palpitations and leg swelling  Neurological: Positive for light-headedness (occasional mild if stands up quickly  )  Negative for dizziness, weakness and headaches  Psychiatric/Behavioral: Negative for dysphoric mood  The patient is not nervous/anxious          Current Outpatient Medications on File Prior to Visit   Medication Sig   • bisoprolol-hydrochlorothiazide (ZIAC) 10-6 25 MG per tablet Take 1 tablet by mouth daily   • DULoxetine (Cymbalta) 30 mg delayed release capsule Take 1 capsule (30 mg total) by mouth daily   • DULoxetine (CYMBALTA) 60 mg delayed release capsule Take 1 capsule (60 mg total) by mouth daily   • gabapentin (NEURONTIN) 400 mg capsule Take 1 capsule (400 mg total) by mouth 3 (three) times a day   • LORazepam (ATIVAN) 0 5 mg tablet Take 1 tablet (0 5 mg total) by mouth 2 (two) times a day   • senna (SENOKOT) 8 6 MG tablet Take 1 tablet by mouth 2 (two) times a day   • [DISCONTINUED] traMADol 100 MG TABS Take 1 tablet by mouth twice daily as needed for pain (Patient not taking: Reported on 10/19/2022)       Objective     /72   Pulse 60   Temp (!) 97 1 °F (36 2 °C) (Temporal)   Resp 16   Ht 5' 4" (1 626 m)   Wt 132 kg (292 lb)   SpO2 99%   BMI 50 12 kg/m²     Physical Exam  Constitutional:       General: She is not in acute distress  Appearance: Normal appearance  She is well-developed  She is obese  She is not ill-appearing  Eyes:      Extraocular Movements: Extraocular movements intact  Pupils: Pupils are equal, round, and reactive to light  Cardiovascular:      Rate and Rhythm: Normal rate and regular rhythm  Pulses: Normal pulses  Heart sounds: Normal heart sounds  No murmur heard  Pulmonary:      Effort: Pulmonary effort is normal       Breath sounds: Normal breath sounds  Musculoskeletal:      Right lower leg: No edema  Left lower leg: No edema  Neurological:      General: No focal deficit present  Mental Status: She is alert and oriented to person, place, and time  Mental status is at baseline  Psychiatric:         Mood and Affect: Mood normal          Behavior: Behavior normal          Thought Content:  Thought content normal        Iftikhar Rolle MD

## 2022-10-20 ENCOUNTER — TELEPHONE (OUTPATIENT)
Dept: FAMILY MEDICINE CLINIC | Facility: CLINIC | Age: 62
End: 2022-10-20

## 2022-10-20 ENCOUNTER — TELEPHONE (OUTPATIENT)
Dept: HEMATOLOGY ONCOLOGY | Facility: CLINIC | Age: 62
End: 2022-10-20

## 2022-10-20 DIAGNOSIS — D50.9 IRON DEFICIENCY ANEMIA, UNSPECIFIED IRON DEFICIENCY ANEMIA TYPE: Primary | ICD-10-CM

## 2022-10-20 NOTE — TELEPHONE ENCOUNTER
Made attempt to schedule a new patient consultation  A voicemail was left with [facility] phone number instructing patient to call back and schedule

## 2022-10-20 NOTE — TELEPHONE ENCOUNTER
Trish Reyes said that she called the number for the hematology and they told her that the doctor you referred her to is a [facility] doctor not a [facility] doctor   Can general hematology referral be put in?

## 2022-10-20 NOTE — TELEPHONE ENCOUNTER
Patient calling in to make a new patient appointment, patient referral  Is for an external provider  I advised the patient to contact the referring provider to confirm if the referral is for Dr Lev Guzmán, or Dr Talisha Kate at [facility]  Patient verbalized understanding

## 2022-10-26 ENCOUNTER — TELEPHONE (OUTPATIENT)
Dept: FAMILY MEDICINE CLINIC | Facility: CLINIC | Age: 62
End: 2022-10-26

## 2022-10-26 DIAGNOSIS — F51.01 PRIMARY INSOMNIA: Primary | ICD-10-CM

## 2022-10-26 RX ORDER — ZOLPIDEM TARTRATE 5 MG/1
5 TABLET ORAL
Qty: 30 TABLET | Refills: 0 | Status: SHIPPED | OUTPATIENT
Start: 2022-10-26

## 2022-10-26 NOTE — TELEPHONE ENCOUNTER
Suzan Campbell was seen last week, and there was trouble with the computer system  She never did get the Ambien & is asking if it can please be ordered to [address] in [place] ([facility])

## 2022-11-16 ENCOUNTER — VBI (OUTPATIENT)
Dept: ADMINISTRATIVE | Facility: OTHER | Age: 62
End: 2022-11-16

## 2023-02-08 DIAGNOSIS — F51.01 PRIMARY INSOMNIA: ICD-10-CM

## 2023-02-08 RX ORDER — ZOLPIDEM TARTRATE 5 MG/1
5 TABLET ORAL
Qty: 30 TABLET | Refills: 0 | Status: SHIPPED | OUTPATIENT
Start: 2023-02-08

## 2023-03-12 DIAGNOSIS — M79.7 FIBROMYALGIA, PRIMARY: ICD-10-CM

## 2023-03-12 DIAGNOSIS — F41.9 ANXIETY: ICD-10-CM

## 2023-03-13 RX ORDER — DULOXETIN HYDROCHLORIDE 30 MG/1
CAPSULE, DELAYED RELEASE ORAL
Qty: 90 CAPSULE | Refills: 0 | Status: SHIPPED | OUTPATIENT
Start: 2023-03-13

## 2023-03-13 RX ORDER — DULOXETIN HYDROCHLORIDE 60 MG/1
CAPSULE, DELAYED RELEASE ORAL
Qty: 90 CAPSULE | Refills: 0 | Status: SHIPPED | OUTPATIENT
Start: 2023-03-13

## 2023-04-03 DIAGNOSIS — F51.01 PRIMARY INSOMNIA: ICD-10-CM

## 2023-04-03 RX ORDER — ZOLPIDEM TARTRATE 5 MG/1
5 TABLET ORAL
Qty: 30 TABLET | Refills: 0 | Status: SHIPPED | OUTPATIENT
Start: 2023-04-03

## 2023-05-31 DIAGNOSIS — F51.01 PRIMARY INSOMNIA: ICD-10-CM

## 2023-05-31 RX ORDER — ZOLPIDEM TARTRATE 5 MG/1
5 TABLET ORAL
Qty: 30 TABLET | Refills: 0 | Status: SHIPPED | OUTPATIENT
Start: 2023-05-31

## 2023-06-12 DIAGNOSIS — F41.9 ANXIETY: ICD-10-CM

## 2023-06-12 DIAGNOSIS — M79.7 FIBROMYALGIA, PRIMARY: ICD-10-CM

## 2023-06-12 RX ORDER — DULOXETIN HYDROCHLORIDE 60 MG/1
CAPSULE, DELAYED RELEASE ORAL
Qty: 90 CAPSULE | Refills: 0 | Status: SHIPPED | OUTPATIENT
Start: 2023-06-12

## 2023-06-12 RX ORDER — DULOXETIN HYDROCHLORIDE 30 MG/1
CAPSULE, DELAYED RELEASE ORAL
Qty: 90 CAPSULE | Refills: 0 | Status: SHIPPED | OUTPATIENT
Start: 2023-06-12

## 2023-06-30 ENCOUNTER — OFFICE VISIT (OUTPATIENT)
Dept: FAMILY MEDICINE CLINIC | Facility: CLINIC | Age: 63
End: 2023-06-30
Payer: COMMERCIAL

## 2023-06-30 VITALS
HEART RATE: 63 BPM | SYSTOLIC BLOOD PRESSURE: 124 MMHG | DIASTOLIC BLOOD PRESSURE: 82 MMHG | BODY MASS INDEX: 50.02 KG/M2 | TEMPERATURE: 98.2 F | OXYGEN SATURATION: 97 % | RESPIRATION RATE: 16 BRPM | HEIGHT: 64 IN | WEIGHT: 293 LBS

## 2023-06-30 DIAGNOSIS — R73.03 PREDIABETES: ICD-10-CM

## 2023-06-30 DIAGNOSIS — Z00.00 ANNUAL PHYSICAL EXAM: ICD-10-CM

## 2023-06-30 DIAGNOSIS — G62.9 NEUROPATHY: ICD-10-CM

## 2023-06-30 DIAGNOSIS — I10 ESSENTIAL HYPERTENSION: ICD-10-CM

## 2023-06-30 DIAGNOSIS — I35.1 NONRHEUMATIC AORTIC VALVE INSUFFICIENCY: ICD-10-CM

## 2023-06-30 DIAGNOSIS — E55.9 VITAMIN D DEFICIENCY: ICD-10-CM

## 2023-06-30 DIAGNOSIS — M79.7 FIBROMYALGIA, PRIMARY: ICD-10-CM

## 2023-06-30 DIAGNOSIS — R53.83 OTHER FATIGUE: ICD-10-CM

## 2023-06-30 DIAGNOSIS — F51.01 PRIMARY INSOMNIA: ICD-10-CM

## 2023-06-30 DIAGNOSIS — M54.50 CHRONIC BILATERAL LOW BACK PAIN WITHOUT SCIATICA: Primary | ICD-10-CM

## 2023-06-30 DIAGNOSIS — G89.29 CHRONIC BILATERAL LOW BACK PAIN WITHOUT SCIATICA: Primary | ICD-10-CM

## 2023-06-30 DIAGNOSIS — D50.8 OTHER IRON DEFICIENCY ANEMIA: ICD-10-CM

## 2023-06-30 DIAGNOSIS — Z12.31 ENCOUNTER FOR SCREENING MAMMOGRAM FOR MALIGNANT NEOPLASM OF BREAST: ICD-10-CM

## 2023-06-30 DIAGNOSIS — E66.01 CLASS 3 SEVERE OBESITY DUE TO EXCESS CALORIES WITH SERIOUS COMORBIDITY AND BODY MASS INDEX (BMI) OF 50.0 TO 59.9 IN ADULT (HCC): ICD-10-CM

## 2023-06-30 PROCEDURE — G0439 PPPS, SUBSEQ VISIT: HCPCS | Performed by: FAMILY MEDICINE

## 2023-06-30 PROCEDURE — 99214 OFFICE O/P EST MOD 30 MIN: CPT | Performed by: FAMILY MEDICINE

## 2023-06-30 RX ORDER — GABAPENTIN 400 MG/1
400 CAPSULE ORAL 3 TIMES DAILY
Qty: 270 CAPSULE | Refills: 0 | Status: SHIPPED | OUTPATIENT
Start: 2023-06-30

## 2023-06-30 RX ORDER — FERROUS SULFATE 325(65) MG
325 TABLET ORAL
COMMUNITY

## 2023-06-30 RX ORDER — BISOPROLOL FUMARATE AND HYDROCHLOROTHIAZIDE 10; 6.25 MG/1; MG/1
1 TABLET ORAL DAILY
Qty: 90 TABLET | Refills: 0 | Status: SHIPPED | OUTPATIENT
Start: 2023-06-30

## 2023-06-30 NOTE — PROGRESS NOTES
Name: Mickey Colindres      : 1960      MRN: [id number]  Encounter Provider: Laura Dugan MD  Encounter Date: 2023   Encounter department: [address] [place] [address] Dr MEDICINE    Assessment & Plan     1  Chronic bilateral low back pain without sciatica  Assessment & Plan: On cymbalta and gabapentin      2  Essential hypertension  Assessment & Plan:  Well controlled on current therapy continue with current medications and will reassess next visit      Orders:  -     bisoprolol-hydrochlorothiazide (ZIAC) 10-6 25 MG per tablet; Take 1 tablet by mouth daily    3  Fibromyalgia, primary  Assessment & Plan:  Ok on cymbalta and gabapentin    Orders:  -     gabapentin (NEURONTIN) 400 mg capsule; Take 1 capsule (400 mg total) by mouth 3 (three) times a day    4  Class 3 severe obesity due to excess calories with serious comorbidity and body mass index (BMI) of 50 0 to 59 9 in adult [facility])  Assessment & Plan:  Discussion on diet and exercise guidelines for weight loss and  health reviewed with pt         5  Other iron deficiency anemia  Assessment & Plan:  Check iron and cbc      6  Primary insomnia  Assessment & Plan:  Ok on insomnia      7  Prediabetes  Assessment & Plan:  Check hga1c    Orders:  -     Hemoglobin A1C; Future; Expected date: 2023    8  Vitamin D deficiency  Assessment & Plan:  Check level    Orders:  -     Vitamin D 25 hydroxy; Future    9  Encounter for screening mammogram for malignant neoplasm of breast  -     Mammo screening bilateral w 3d & cad; Future; Expected date: 2023    10  Annual physical exam  -     CBC and differential; Future  -     Comprehensive metabolic panel; Future; Expected date: 2023  -     Lipid panel; Future; Expected date: 2023  -     Urinalysis with microscopic    11  Neuropathy  Assessment & Plan:  Pt has had since back  Surgery monofilament and vibration absent      12   Other fatigue  Assessment & Plan:  Check tsh    Orders:  -     TSH, 3rd generation; Future    13  Nonrheumatic aortic valve insufficiency  Assessment & Plan: Will get copy of echo done 3/22 outside             Subjective      Hypertension  This is a chronic problem  The current episode started more than 1 year ago  The problem is unchanged  The problem is controlled  Associated symptoms include headaches and malaise/fatigue  Pertinent negatives include no anxiety, blurred vision, chest pain, neck pain, orthopnea, palpitations, peripheral edema, PND, shortness of breath or sweats  Agents associated with hypertension include NSAIDs  Risk factors for coronary artery disease include family history, obesity, sedentary lifestyle and stress  There are no compliance problems  Patient here for annual physical/ interval visit     Review of Systems   Constitutional: Positive for fatigue and malaise/fatigue  Negative for appetite change, chills and fever  Eyes: Negative for blurred vision  Respiratory: Negative for cough, chest tightness and shortness of breath  Cardiovascular: Negative for chest pain, palpitations, orthopnea, leg swelling and PND  Gastrointestinal: Negative for abdominal pain, constipation, diarrhea, nausea and vomiting  Genitourinary: Negative for difficulty urinating and frequency  Musculoskeletal: Positive for gait problem (feels unsteady )  Negative for arthralgias, back pain and neck pain  Skin: Negative for rash  Neurological: Positive for numbness (both feet) and headaches  Negative for dizziness, weakness and light-headedness  Hematological: Does not bruise/bleed easily  Psychiatric/Behavioral: Negative for dysphoric mood and sleep disturbance  The patient is not nervous/anxious          Current Outpatient Medications on File Prior to Visit   Medication Sig   • DULoxetine (CYMBALTA) 30 mg delayed release capsule TAKE ONE CAPSULE BY MOUTH DAILY   • DULoxetine (CYMBALTA) 60 mg delayed release capsule TAKE ONE CAPSULE BY MOUTH EVERY DAY   • ferrous "sulfate 325 (65 Fe) mg tablet Take 325 mg by mouth daily with breakfast   • senna (SENOKOT) 8 6 MG tablet Take 1 tablet by mouth 2 (two) times a day   • zolpidem (AMBIEN) 5 mg tablet Take 1 tablet (5 mg total) by mouth daily at bedtime as needed for sleep   • [DISCONTINUED] bisoprolol-hydrochlorothiazide (ZIAC) 10-6 25 MG per tablet TAKE ONE TABLET BY MOUTH DAILY  • [DISCONTINUED] gabapentin (NEURONTIN) 400 mg capsule TAKE ONE CAPSULE BY MOUTH 3 TIMES A DAY   • [DISCONTINUED] LORazepam (ATIVAN) 0 5 mg tablet Take 1 tablet (0 5 mg total) by mouth 2 (two) times a day (Patient not taking: Reported on 6/30/2023)       Objective     /82 (BP Location: Left arm, Patient Position: Sitting, Cuff Size: Large)   Pulse 63   Temp 98 2 °F (36 8 °C) (Tympanic)   Resp 16   Ht 5' 4\" (1 626 m)   Wt (!) 140 kg (308 lb)   SpO2 97%   BMI 52 87 kg/m²     Physical Exam  Vitals reviewed  Constitutional:       General: She is not in acute distress  Appearance: Normal appearance  She is well-developed  She is obese  HENT:      Head: Normocephalic  Right Ear: Tympanic membrane, ear canal and external ear normal       Left Ear: Tympanic membrane, ear canal and external ear normal       Nose: Nose normal       Mouth/Throat:      Pharynx: No oropharyngeal exudate  Eyes:      General: Lids are normal       Extraocular Movements: Extraocular movements intact  Conjunctiva/sclera: Conjunctivae normal       Pupils: Pupils are equal, round, and reactive to light  Neck:      Thyroid: No thyromegaly  Vascular: No carotid bruit  Cardiovascular:      Rate and Rhythm: Normal rate and regular rhythm  Pulses: Normal pulses  Heart sounds: Murmur (2/6 systolic) heard  No friction rub  Pulmonary:      Effort: Pulmonary effort is normal  No respiratory distress  Breath sounds: Normal breath sounds  No stridor  No wheezing or rales     Chest:   Breasts:     Breasts are symmetrical       Right: " Normal  No swelling, bleeding, inverted nipple, mass, nipple discharge, skin change or tenderness  Left: Normal  No swelling, bleeding, inverted nipple, mass, nipple discharge, skin change or tenderness  Abdominal:      General: Bowel sounds are normal  There is no distension  Palpations: Abdomen is soft  There is no mass  Tenderness: There is no abdominal tenderness  There is no guarding  Hernia: No hernia is present  Musculoskeletal:         General: Normal range of motion  Cervical back: Full passive range of motion without pain, normal range of motion and neck supple  Lymphadenopathy:      Cervical: No cervical adenopathy  Skin:     General: Skin is warm and dry  Findings: No rash  Comments: Nl appearing moles   Neurological:      General: No focal deficit present  Mental Status: She is alert and oriented to person, place, and time  Mental status is at baseline  Cranial Nerves: No cranial nerve deficit  Sensory: Sensory deficit (vibration and filament both abnormal ) present  Motor: No weakness or abnormal muscle tone  Coordination: Coordination normal       Gait: Gait normal       Deep Tendon Reflexes: Reflexes normal  Babinski sign absent on the right side  Psychiatric:         Mood and Affect: Mood normal          Speech: Speech normal          Behavior: Behavior normal          Thought Content:  Thought content normal          Judgment: Judgment normal        Nick Beltrán MD

## 2023-07-13 DIAGNOSIS — F51.01 PRIMARY INSOMNIA: ICD-10-CM

## 2023-07-13 RX ORDER — ZOLPIDEM TARTRATE 5 MG/1
5 TABLET ORAL
Qty: 30 TABLET | Refills: 0 | Status: SHIPPED | OUTPATIENT
Start: 2023-07-13

## 2023-08-03 ENCOUNTER — TELEPHONE (OUTPATIENT)
Dept: FAMILY MEDICINE CLINIC | Facility: CLINIC | Age: 63
End: 2023-08-03

## 2023-08-09 ENCOUNTER — OFFICE VISIT (OUTPATIENT)
Dept: FAMILY MEDICINE CLINIC | Facility: CLINIC | Age: 63
End: 2023-08-09
Payer: COMMERCIAL

## 2023-08-09 VITALS
BODY MASS INDEX: 50.02 KG/M2 | RESPIRATION RATE: 16 BRPM | SYSTOLIC BLOOD PRESSURE: 138 MMHG | TEMPERATURE: 98.1 F | DIASTOLIC BLOOD PRESSURE: 84 MMHG | HEIGHT: 64 IN | OXYGEN SATURATION: 99 % | WEIGHT: 293 LBS | HEART RATE: 63 BPM

## 2023-08-09 DIAGNOSIS — J20.8 ACUTE BRONCHITIS DUE TO OTHER SPECIFIED ORGANISMS: Primary | ICD-10-CM

## 2023-08-09 PROCEDURE — 99213 OFFICE O/P EST LOW 20 MIN: CPT | Performed by: FAMILY MEDICINE

## 2023-08-09 RX ORDER — AZITHROMYCIN 250 MG/1
TABLET, FILM COATED ORAL
Qty: 6 TABLET | Refills: 0 | Status: SHIPPED | OUTPATIENT
Start: 2023-08-09 | End: 2023-08-14

## 2023-08-09 RX ORDER — BENZONATATE 200 MG/1
200 CAPSULE ORAL 3 TIMES DAILY PRN
Qty: 20 CAPSULE | Refills: 0 | Status: SHIPPED | OUTPATIENT
Start: 2023-08-09

## 2023-08-09 NOTE — PROGRESS NOTES
Name: Hernan Cheng      : 1960      MRN: [id number]  Encounter Provider: Maria Alejandra Lopez MD  Encounter Date: 2023   Encounter department: [address] MEDICINE    Assessment & Plan     1. Acute bronchitis due to other specified organisms  Assessment & Plan:  zpak  Cough meds     Orders:  -     azithromycin (ZITHROMAX) 250 mg tablet; Take 2 tablets today then 1 tablet daily x 4 days  -     benzonatate (TESSALON) 200 MG capsule; Take 1 capsule (200 mg total) by mouth 3 (three) times a day as needed for cough           Subjective      HPI pt ill for 1 week had initial sore throat now cough some right ear discomfort tested covid  4 days ago was negative   Review of Systems   Constitutional: Negative for chills and fever. HENT: Positive for ear pain (right). Negative for congestion, mouth sores, rhinorrhea, sinus pressure, sinus pain, sore throat and trouble swallowing. Eyes: Negative for discharge. Respiratory: Positive for cough. Negative for chest tightness and shortness of breath. Cardiovascular: Negative for chest pain. Musculoskeletal: Negative for arthralgias and myalgias. Neurological: Negative for headaches.        Current Outpatient Medications on File Prior to Visit   Medication Sig   • bisoprolol-hydrochlorothiazide (ZIAC) 10-6.25 MG per tablet Take 1 tablet by mouth daily   • DULoxetine (CYMBALTA) 30 mg delayed release capsule TAKE ONE CAPSULE BY MOUTH DAILY   • DULoxetine (CYMBALTA) 60 mg delayed release capsule TAKE ONE CAPSULE BY MOUTH EVERY DAY   • ferrous sulfate 325 (65 Fe) mg tablet Take 325 mg by mouth daily with breakfast   • gabapentin (NEURONTIN) 400 mg capsule Take 1 capsule (400 mg total) by mouth 3 (three) times a day   • senna (SENOKOT) 8.6 MG tablet Take 1 tablet by mouth 2 (two) times a day   • zolpidem (AMBIEN) 5 mg tablet Take 1 tablet (5 mg total) by mouth daily at bedtime as needed for sleep       Objective     /84 (BP Location: Left arm, Patient Position: Sitting, Cuff Size: Large)   Pulse 63   Temp 98.1 °F (36.7 °C) (Tympanic)   Resp 16   Ht 5' 4" (1.626 m)   Wt (!) 138 kg (304 lb)   SpO2 99%   BMI 52.18 kg/m²     Physical Exam  Constitutional:       General: She is not in acute distress. Appearance: Normal appearance. She is well-developed. She is not ill-appearing. HENT:      Right Ear: Tympanic membrane and ear canal normal.      Left Ear: Tympanic membrane and ear canal normal.      Nose: Nose normal.      Right Sinus: No maxillary sinus tenderness or frontal sinus tenderness. Left Sinus: No maxillary sinus tenderness or frontal sinus tenderness. Mouth/Throat:      Mouth: Mucous membranes are moist.      Pharynx: No oropharyngeal exudate or posterior oropharyngeal erythema. Tonsils: No tonsillar exudate. Eyes:      General:         Right eye: No discharge. Left eye: No discharge. Conjunctiva/sclera: Conjunctivae normal.      Pupils: Pupils are equal, round, and reactive to light. Cardiovascular:      Rate and Rhythm: Normal rate. Heart sounds: Normal heart sounds. Pulmonary:      Effort: Pulmonary effort is normal. No respiratory distress. Breath sounds: Normal breath sounds. No stridor. No wheezing, rhonchi or rales. Musculoskeletal:      Cervical back: Normal range of motion. Lymphadenopathy:      Cervical: No cervical adenopathy. Neurological:      General: No focal deficit present. Mental Status: She is alert. Mental status is at baseline.    Psychiatric:         Mood and Affect: Mood normal.       Ezio Glover MD

## 2023-09-05 DIAGNOSIS — F51.01 PRIMARY INSOMNIA: ICD-10-CM

## 2023-09-05 RX ORDER — ZOLPIDEM TARTRATE 5 MG/1
5 TABLET ORAL
Qty: 30 TABLET | Refills: 0 | Status: SHIPPED | OUTPATIENT
Start: 2023-09-05

## 2023-09-08 ENCOUNTER — TELEPHONE (OUTPATIENT)
Dept: FAMILY MEDICINE CLINIC | Facility: CLINIC | Age: 63
End: 2023-09-08

## 2023-09-08 ENCOUNTER — APPOINTMENT (OUTPATIENT)
Dept: LAB | Facility: HOSPITAL | Age: 63
End: 2023-09-08
Attending: FAMILY MEDICINE
Payer: COMMERCIAL

## 2023-09-08 ENCOUNTER — APPOINTMENT (OUTPATIENT)
Dept: LAB | Facility: HOSPITAL | Age: 63
End: 2023-09-08
Payer: COMMERCIAL

## 2023-09-08 DIAGNOSIS — E55.9 VITAMIN D DEFICIENCY: Primary | ICD-10-CM

## 2023-09-08 DIAGNOSIS — R73.03 PREDIABETES: ICD-10-CM

## 2023-09-08 DIAGNOSIS — Z00.8 HEALTH EXAMINATION IN POPULATION SURVEY: ICD-10-CM

## 2023-09-08 DIAGNOSIS — E55.9 VITAMIN D DEFICIENCY: ICD-10-CM

## 2023-09-08 DIAGNOSIS — R53.83 OTHER FATIGUE: ICD-10-CM

## 2023-09-08 DIAGNOSIS — Z00.00 ANNUAL PHYSICAL EXAM: ICD-10-CM

## 2023-09-08 DIAGNOSIS — D50.8 OTHER IRON DEFICIENCY ANEMIA: ICD-10-CM

## 2023-09-08 LAB
25(OH)D3 SERPL-MCNC: 17.9 NG/ML (ref 30–100)
ALBUMIN SERPL BCP-MCNC: 4.1 G/DL (ref 3.5–5)
ALP SERPL-CCNC: 94 U/L (ref 34–104)
ALT SERPL W P-5'-P-CCNC: 13 U/L (ref 7–52)
ANION GAP SERPL CALCULATED.3IONS-SCNC: 7 MMOL/L
AST SERPL W P-5'-P-CCNC: 16 U/L (ref 13–39)
BACTERIA UR QL AUTO: ABNORMAL /HPF
BASOPHILS # BLD AUTO: 0.05 THOUSANDS/ÂΜL (ref 0–0.1)
BASOPHILS NFR BLD AUTO: 1 % (ref 0–1)
BILIRUB SERPL-MCNC: 0.48 MG/DL (ref 0.2–1)
BILIRUB UR QL STRIP: NEGATIVE
BUN SERPL-MCNC: 17 MG/DL (ref 5–25)
CALCIUM SERPL-MCNC: 9.2 MG/DL (ref 8.4–10.2)
CHLORIDE SERPL-SCNC: 104 MMOL/L (ref 96–108)
CHOLEST SERPL-MCNC: 154 MG/DL
CLARITY UR: CLEAR
CO2 SERPL-SCNC: 28 MMOL/L (ref 21–32)
COLOR UR: YELLOW
CREAT SERPL-MCNC: 0.91 MG/DL (ref 0.6–1.3)
EOSINOPHIL # BLD AUTO: 0.3 THOUSAND/ÂΜL (ref 0–0.61)
EOSINOPHIL NFR BLD AUTO: 4 % (ref 0–6)
ERYTHROCYTE [DISTWIDTH] IN BLOOD BY AUTOMATED COUNT: 17 % (ref 11.6–15.1)
EST. AVERAGE GLUCOSE BLD GHB EST-MCNC: 128 MG/DL
GFR SERPL CREATININE-BSD FRML MDRD: 67 ML/MIN/1.73SQ M
GLUCOSE P FAST SERPL-MCNC: 117 MG/DL (ref 65–99)
GLUCOSE UR STRIP-MCNC: NEGATIVE MG/DL
HBA1C MFR BLD: 6.1 %
HCT VFR BLD AUTO: 38.1 % (ref 34.8–46.1)
HDLC SERPL-MCNC: 44 MG/DL
HGB BLD-MCNC: 11.4 G/DL (ref 11.5–15.4)
HGB UR QL STRIP.AUTO: NEGATIVE
IMM GRANULOCYTES # BLD AUTO: 0.02 THOUSAND/UL (ref 0–0.2)
IMM GRANULOCYTES NFR BLD AUTO: 0 % (ref 0–2)
KETONES UR STRIP-MCNC: NEGATIVE MG/DL
LDLC SERPL CALC-MCNC: 89 MG/DL (ref 0–100)
LEUKOCYTE ESTERASE UR QL STRIP: NEGATIVE
LYMPHOCYTES # BLD AUTO: 1.53 THOUSANDS/ÂΜL (ref 0.6–4.47)
LYMPHOCYTES NFR BLD AUTO: 22 % (ref 14–44)
MCH RBC QN AUTO: 23.8 PG (ref 26.8–34.3)
MCHC RBC AUTO-ENTMCNC: 29.9 G/DL (ref 31.4–37.4)
MCV RBC AUTO: 80 FL (ref 82–98)
MONOCYTES # BLD AUTO: 0.43 THOUSAND/ÂΜL (ref 0.17–1.22)
MONOCYTES NFR BLD AUTO: 6 % (ref 4–12)
NEUTROPHILS # BLD AUTO: 4.5 THOUSANDS/ÂΜL (ref 1.85–7.62)
NEUTS SEG NFR BLD AUTO: 67 % (ref 43–75)
NITRITE UR QL STRIP: NEGATIVE
NON-SQ EPI CELLS URNS QL MICRO: ABNORMAL /HPF
NONHDLC SERPL-MCNC: 110 MG/DL
NRBC BLD AUTO-RTO: 0 /100 WBCS
PH UR STRIP.AUTO: 5.5 [PH]
PLATELET # BLD AUTO: 278 THOUSANDS/UL (ref 149–390)
PMV BLD AUTO: 10.6 FL (ref 8.9–12.7)
POTASSIUM SERPL-SCNC: 4.3 MMOL/L (ref 3.5–5.3)
PROT SERPL-MCNC: 7.5 G/DL (ref 6.4–8.4)
PROT UR STRIP-MCNC: NEGATIVE MG/DL
RBC # BLD AUTO: 4.79 MILLION/UL (ref 3.81–5.12)
RBC #/AREA URNS AUTO: ABNORMAL /HPF
SODIUM SERPL-SCNC: 139 MMOL/L (ref 135–147)
SP GR UR STRIP.AUTO: 1.02 (ref 1–1.03)
TRIGL SERPL-MCNC: 103 MG/DL
TSH SERPL DL<=0.05 MIU/L-ACNC: 2.72 UIU/ML (ref 0.45–4.5)
UROBILINOGEN UR QL STRIP.AUTO: 1 E.U./DL
WBC # BLD AUTO: 6.83 THOUSAND/UL (ref 4.31–10.16)
WBC #/AREA URNS AUTO: ABNORMAL /HPF

## 2023-09-08 PROCEDURE — 83036 HEMOGLOBIN GLYCOSYLATED A1C: CPT

## 2023-09-08 PROCEDURE — 82306 VITAMIN D 25 HYDROXY: CPT

## 2023-09-08 PROCEDURE — 80061 LIPID PANEL: CPT

## 2023-09-08 PROCEDURE — 80053 COMPREHEN METABOLIC PANEL: CPT

## 2023-09-08 PROCEDURE — 84443 ASSAY THYROID STIM HORMONE: CPT

## 2023-09-08 PROCEDURE — 36415 COLL VENOUS BLD VENIPUNCTURE: CPT

## 2023-09-08 PROCEDURE — 85025 COMPLETE CBC W/AUTO DIFF WBC: CPT

## 2023-09-08 NOTE — TELEPHONE ENCOUNTER
Spoke with patient in regards for vit d level and supplement recommendation. Recheck a1c and vit D.  -- patient mention if she can do a iron panel ?

## 2023-09-13 DIAGNOSIS — F41.9 ANXIETY: ICD-10-CM

## 2023-09-13 DIAGNOSIS — I10 ESSENTIAL HYPERTENSION: ICD-10-CM

## 2023-09-13 DIAGNOSIS — M79.7 FIBROMYALGIA, PRIMARY: ICD-10-CM

## 2023-09-13 RX ORDER — BISOPROLOL FUMARATE AND HYDROCHLOROTHIAZIDE 10; 6.25 MG/1; MG/1
1 TABLET ORAL DAILY
Qty: 90 TABLET | Refills: 0 | Status: SHIPPED | OUTPATIENT
Start: 2023-09-13

## 2023-09-13 RX ORDER — DULOXETIN HYDROCHLORIDE 30 MG/1
30 CAPSULE, DELAYED RELEASE ORAL DAILY
Qty: 90 CAPSULE | Refills: 0 | Status: SHIPPED | OUTPATIENT
Start: 2023-09-13

## 2023-09-13 RX ORDER — DULOXETIN HYDROCHLORIDE 60 MG/1
60 CAPSULE, DELAYED RELEASE ORAL DAILY
Qty: 90 CAPSULE | Refills: 0 | Status: SHIPPED | OUTPATIENT
Start: 2023-09-13

## 2023-09-15 ENCOUNTER — APPOINTMENT (OUTPATIENT)
Dept: LAB | Facility: HOSPITAL | Age: 63
End: 2023-09-15
Attending: FAMILY MEDICINE
Payer: COMMERCIAL

## 2023-09-15 DIAGNOSIS — D50.8 OTHER IRON DEFICIENCY ANEMIA: ICD-10-CM

## 2023-09-15 LAB
FERRITIN SERPL-MCNC: 7 NG/ML (ref 11–307)
IRON SATN MFR SERPL: 11 % (ref 15–50)
IRON SERPL-MCNC: 49 UG/DL (ref 50–212)
TIBC SERPL-MCNC: 437 UG/DL (ref 250–450)
UIBC SERPL-MCNC: 388 UG/DL (ref 155–355)

## 2023-09-15 PROCEDURE — 82728 ASSAY OF FERRITIN: CPT

## 2023-09-15 PROCEDURE — 36415 COLL VENOUS BLD VENIPUNCTURE: CPT

## 2023-09-15 PROCEDURE — 83540 ASSAY OF IRON: CPT

## 2023-09-15 PROCEDURE — 83550 IRON BINDING TEST: CPT

## 2023-09-18 ENCOUNTER — TELEPHONE (OUTPATIENT)
Dept: FAMILY MEDICINE CLINIC | Facility: CLINIC | Age: 63
End: 2023-09-18

## 2023-09-18 DIAGNOSIS — D50.8 OTHER IRON DEFICIENCY ANEMIA: Primary | ICD-10-CM

## 2023-09-25 ENCOUNTER — HOSPITAL ENCOUNTER (OUTPATIENT)
Dept: RADIOLOGY | Age: 63
Discharge: HOME | End: 2023-09-25
Payer: COMMERCIAL

## 2023-09-25 DIAGNOSIS — Z12.31 ENCOUNTER FOR SCREENING MAMMOGRAM FOR MALIGNANT NEOPLASM OF BREAST: ICD-10-CM

## 2023-09-25 PROCEDURE — 77063 BREAST TOMOSYNTHESIS BI: CPT

## 2023-09-25 PROCEDURE — 77067 SCR MAMMO BI INCL CAD: CPT

## 2023-10-08 PROBLEM — J20.8 ACUTE BRONCHITIS DUE TO OTHER SPECIFIED ORGANISMS: Status: RESOLVED | Noted: 2023-08-09 | Resolved: 2023-10-08

## 2023-10-10 DIAGNOSIS — M79.7 FIBROMYALGIA, PRIMARY: ICD-10-CM

## 2023-10-10 RX ORDER — GABAPENTIN 400 MG/1
400 CAPSULE ORAL 3 TIMES DAILY
Qty: 270 CAPSULE | Refills: 1 | Status: SHIPPED | OUTPATIENT
Start: 2023-10-10

## 2023-10-26 DIAGNOSIS — F51.01 PRIMARY INSOMNIA: ICD-10-CM

## 2023-10-26 RX ORDER — ZOLPIDEM TARTRATE 5 MG/1
5 TABLET ORAL
Qty: 30 TABLET | Refills: 0 | Status: SHIPPED | OUTPATIENT
Start: 2023-10-26

## 2023-12-06 DIAGNOSIS — I10 ESSENTIAL HYPERTENSION: ICD-10-CM

## 2023-12-06 DIAGNOSIS — F41.9 ANXIETY: ICD-10-CM

## 2023-12-06 DIAGNOSIS — M79.7 FIBROMYALGIA, PRIMARY: ICD-10-CM

## 2023-12-06 DIAGNOSIS — F51.01 PRIMARY INSOMNIA: ICD-10-CM

## 2023-12-06 RX ORDER — BISOPROLOL FUMARATE AND HYDROCHLOROTHIAZIDE 10; 6.25 MG/1; MG/1
1 TABLET ORAL DAILY
Qty: 90 TABLET | Refills: 1 | Status: SHIPPED | OUTPATIENT
Start: 2023-12-06

## 2023-12-06 RX ORDER — DULOXETIN HYDROCHLORIDE 60 MG/1
60 CAPSULE, DELAYED RELEASE ORAL DAILY
Qty: 90 CAPSULE | Refills: 1 | Status: SHIPPED | OUTPATIENT
Start: 2023-12-06

## 2023-12-06 RX ORDER — DULOXETIN HYDROCHLORIDE 30 MG/1
30 CAPSULE, DELAYED RELEASE ORAL DAILY
Qty: 90 CAPSULE | Refills: 1 | Status: SHIPPED | OUTPATIENT
Start: 2023-12-06

## 2023-12-06 RX ORDER — ZOLPIDEM TARTRATE 5 MG/1
5 TABLET ORAL
Qty: 30 TABLET | Refills: 0 | Status: SHIPPED | OUTPATIENT
Start: 2023-12-06

## 2023-12-14 NOTE — TELEPHONE ENCOUNTER
Please schedule an office follow-up and re-evaluation with ARCHIE  Thank you Medical Necessity Information: It is in your best interest to select a reason for this procedure from the list below. All of these items fulfill various CMS LCD requirements except the new and changing color options. Medical Necessity Clause: This procedure was medically necessary because the lesion that was treated was: Lab: 325 Lab Facility: 0 Detail Level: Detailed Was A Bandage Applied: Yes Size Of Lesion In Cm (Required): 0.3 Depth Of Shave: dermis Biopsy Method: Dermablade Anesthesia Type: 1% lidocaine with epinephrine Hemostasis: Drysol Wound Care: Petrolatum Render Path Notes In Note?: No Consent was obtained from the patient. The risks and benefits to therapy were discussed in detail. Specifically, the risks of infection, scarring, bleeding, prolonged wound healing, incomplete removal, allergy to anesthesia, nerve injury and recurrence were addressed. Prior to the procedure, the treatment site was clearly identified and confirmed by the patient. All components of Universal Protocol/PAUSE Rule completed. Post-Care Instructions: I reviewed with the patient in detail post-care instructions. Patient is to keep the biopsy site dry overnight, and then apply bacitracin twice daily until healed. Patient may apply hydrogen peroxide soaks to remove any crusting. Notification Instructions: Patient will be notified of pathology results. However, patient instructed to call the office if not contacted within 2 weeks. Billing Type: Third-Party Bill

## 2024-01-10 ENCOUNTER — APPOINTMENT (OUTPATIENT)
Dept: LAB | Facility: HOSPITAL | Age: 64
End: 2024-01-10
Payer: COMMERCIAL

## 2024-01-10 DIAGNOSIS — E55.9 VITAMIN D DEFICIENCY: ICD-10-CM

## 2024-01-10 DIAGNOSIS — R73.03 PREDIABETES: ICD-10-CM

## 2024-01-10 DIAGNOSIS — D50.8 OTHER IRON DEFICIENCY ANEMIA: ICD-10-CM

## 2024-01-10 LAB
25(OH)D3 SERPL-MCNC: 41.5 NG/ML (ref 30–100)
BASOPHILS # BLD AUTO: 0.04 THOUSANDS/ÂΜL (ref 0–0.1)
BASOPHILS NFR BLD AUTO: 1 % (ref 0–1)
EOSINOPHIL # BLD AUTO: 0.42 THOUSAND/ÂΜL (ref 0–0.61)
EOSINOPHIL NFR BLD AUTO: 5 % (ref 0–6)
ERYTHROCYTE [DISTWIDTH] IN BLOOD BY AUTOMATED COUNT: 17.6 % (ref 11.6–15.1)
EST. AVERAGE GLUCOSE BLD GHB EST-MCNC: 120 MG/DL
FERRITIN SERPL-MCNC: 14 NG/ML (ref 11–307)
HBA1C MFR BLD: 5.8 %
HCT VFR BLD AUTO: 41.4 % (ref 34.8–46.1)
HGB BLD-MCNC: 13.1 G/DL (ref 11.5–15.4)
IMM GRANULOCYTES # BLD AUTO: 0.02 THOUSAND/UL (ref 0–0.2)
IMM GRANULOCYTES NFR BLD AUTO: 0 % (ref 0–2)
IRON SATN MFR SERPL: 12 % (ref 15–50)
IRON SERPL-MCNC: 51 UG/DL (ref 50–212)
LYMPHOCYTES # BLD AUTO: 1.97 THOUSANDS/ÂΜL (ref 0.6–4.47)
LYMPHOCYTES NFR BLD AUTO: 23 % (ref 14–44)
MCH RBC QN AUTO: 26.6 PG (ref 26.8–34.3)
MCHC RBC AUTO-ENTMCNC: 31.6 G/DL (ref 31.4–37.4)
MCV RBC AUTO: 84 FL (ref 82–98)
MONOCYTES # BLD AUTO: 0.52 THOUSAND/ÂΜL (ref 0.17–1.22)
MONOCYTES NFR BLD AUTO: 6 % (ref 4–12)
NEUTROPHILS # BLD AUTO: 5.6 THOUSANDS/ÂΜL (ref 1.85–7.62)
NEUTS SEG NFR BLD AUTO: 65 % (ref 43–75)
NRBC BLD AUTO-RTO: 0 /100 WBCS
PLATELET # BLD AUTO: 254 THOUSANDS/UL (ref 149–390)
PMV BLD AUTO: 10.2 FL (ref 8.9–12.7)
RBC # BLD AUTO: 4.93 MILLION/UL (ref 3.81–5.12)
TIBC SERPL-MCNC: 425 UG/DL (ref 250–450)
UIBC SERPL-MCNC: 374 UG/DL (ref 155–355)
WBC # BLD AUTO: 8.57 THOUSAND/UL (ref 4.31–10.16)

## 2024-01-10 PROCEDURE — 85025 COMPLETE CBC W/AUTO DIFF WBC: CPT

## 2024-01-10 PROCEDURE — 36415 COLL VENOUS BLD VENIPUNCTURE: CPT

## 2024-01-10 PROCEDURE — 82728 ASSAY OF FERRITIN: CPT

## 2024-01-10 PROCEDURE — 82306 VITAMIN D 25 HYDROXY: CPT

## 2024-01-10 PROCEDURE — 83036 HEMOGLOBIN GLYCOSYLATED A1C: CPT

## 2024-01-10 PROCEDURE — 83550 IRON BINDING TEST: CPT

## 2024-01-10 PROCEDURE — 83540 ASSAY OF IRON: CPT

## 2024-01-11 ENCOUNTER — TELEPHONE (OUTPATIENT)
Dept: FAMILY MEDICINE CLINIC | Facility: CLINIC | Age: 64
End: 2024-01-11

## 2024-01-11 DIAGNOSIS — D50.8 OTHER IRON DEFICIENCY ANEMIA: Primary | ICD-10-CM

## 2024-01-11 NOTE — TELEPHONE ENCOUNTER
----- Message from Flaquita Bourne MD sent at 1/11/2024  8:43 AM EST -----  Is pt taking iron?   Her  is low slow fe bid otc repeat iron panel 1 mo

## 2024-01-11 NOTE — TELEPHONE ENCOUNTER
Pt is taking Iron and 2 pills a day at 65mg in morning and one 65mg at night and has been doing this for about a year when this was

## 2024-01-12 ENCOUNTER — OFFICE VISIT (OUTPATIENT)
Dept: URGENT CARE | Facility: MEDICAL CENTER | Age: 64
End: 2024-01-12
Payer: COMMERCIAL

## 2024-01-12 VITALS
BODY MASS INDEX: 51.91 KG/M2 | OXYGEN SATURATION: 96 % | RESPIRATION RATE: 18 BRPM | HEART RATE: 64 BPM | DIASTOLIC BLOOD PRESSURE: 73 MMHG | TEMPERATURE: 97.7 F | SYSTOLIC BLOOD PRESSURE: 174 MMHG | WEIGHT: 293 LBS | HEIGHT: 63 IN

## 2024-01-12 DIAGNOSIS — B02.9 HERPES ZOSTER WITHOUT COMPLICATION: Primary | ICD-10-CM

## 2024-01-12 PROCEDURE — 99213 OFFICE O/P EST LOW 20 MIN: CPT | Performed by: NURSE PRACTITIONER

## 2024-01-12 RX ORDER — VALACYCLOVIR HYDROCHLORIDE 1 G/1
1000 TABLET, FILM COATED ORAL 3 TIMES DAILY
Qty: 21 TABLET | Refills: 0 | Status: SHIPPED | OUTPATIENT
Start: 2024-01-12 | End: 2024-01-19

## 2024-01-12 NOTE — PATIENT INSTRUCTIONS
--Take Rx antiviral as prescribed  --Motrin and/or Tylenol as needed for pain.   --Infectious considerations as discussed and as per handout  --Follow-up with PCP if no improvement/worsening over next 3-7 days at which time a temporary increase of gabapentin and/or opioid may be considered.

## 2024-01-12 NOTE — PROGRESS NOTES
[facility]        NAME: Silvia Antonio is a 63 y.o. female  : 1960    MRN: [id number]  DATE: 2024  TIME: 6:55 PM    Assessment and Plan   Herpes zoster without complication [B02.9]  1. Herpes zoster without complication  valACYclovir (VALTREX) 1,000 mg tablet            Patient Instructions     --Take Rx antiviral as prescribed  --Motrin and/or Tylenol as needed for pain.   --Infectious considerations as discussed and as per handout  --Follow-up with PCP if no improvement/worsening over next 3-7 days at which time a temporary increase of gabapentin and/or opioid may be considered.      Chief Complaint     Chief Complaint   Patient presents with    Rash     Started with rash on neck now on chest painful, feels like stabbing pain, itchy.         History of Present Illness       Here with complaints of painful, somewhat itchy rash on left shoulder and upper chest x 4 days.    No known infectious or contact precipitants.    No associated fever, headache, body aches, dyspnea.    No rash elsewhere.   OTC hydrocortisone cream not helping.   Had shingles x 1 on right hip ten years ago.           Review of Systems   Review of Systems   Constitutional:  Negative for fever.   Respiratory:  Negative for shortness of breath.    Cardiovascular:  Negative for chest pain.   Skin:  Positive for rash.         Current Medications       Current Outpatient Medications:     bisoprolol-hydrochlorothiazide (ZIAC) 10-6.25 MG per tablet, Take 1 tablet by mouth daily, Disp: 90 tablet, Rfl: 1    DULoxetine (CYMBALTA) 30 mg delayed release capsule, Take 1 capsule (30 mg total) by mouth daily, Disp: 90 capsule, Rfl: 1    DULoxetine (CYMBALTA) 60 mg delayed release capsule, Take 1 capsule (60 mg total) by mouth daily, Disp: 90 capsule, Rfl: 1    ferrous sulfate 325 (65 Fe) mg tablet, Take 325 mg by mouth daily with breakfast, Disp: , Rfl:     gabapentin (NEURONTIN) 400 mg capsule, Take 1 capsule (400 mg total) by  "mouth 3 (three) times a day, Disp: 270 capsule, Rfl: 1    valACYclovir (VALTREX) 1,000 mg tablet, Take 1 tablet (1,000 mg total) by mouth 3 (three) times a day for 7 days, Disp: 21 tablet, Rfl: 0    zolpidem (AMBIEN) 5 mg tablet, Take 1 tablet (5 mg total) by mouth daily at bedtime as needed for sleep, Disp: 30 tablet, Rfl: 0    benzonatate (TESSALON) 200 MG capsule, Take 1 capsule (200 mg total) by mouth 3 (three) times a day as needed for cough, Disp: 20 capsule, Rfl: 0    senna (SENOKOT) 8.6 MG tablet, Take 1 tablet by mouth 2 (two) times a day, Disp: , Rfl:     Current Allergies     Allergies as of 01/12/2024    (No Known Allergies)            The following portions of the patient's history were reviewed and updated as appropriate: allergies, current medications, past family history, past medical history, past social history, past surgical history and problem list.     Past Medical History:   Diagnosis Date    Anemia     Anxiety     Arthritis     Fibromyalgia     Headache(784.0)     High blood pressure     Hypertension     Obesity     Panic attacks     Shingles        Past Surgical History:   Procedure Laterality Date    CHOLECYSTECTOMY      HERNIA REPAIR      KNEE SURGERY      HERIBERTO-EN-Y PROCEDURE      SHOULDER OPEN ROTATOR CUFF REPAIR      SPINAL FUSION      TUBAL LIGATION         Family History   Problem Relation Age of Onset    Hypertension Mother     COPD Mother     Heart attack Father         late 50s     Arthritis Father     Pulmonary embolism Father     No Known Problems Sister     Hypertension Brother     Alcohol abuse Brother     No Known Problems Maternal Grandmother     No Known Problems Paternal Grandmother     No Known Problems Maternal Aunt     No Known Problems Paternal Aunt     Breast cancer Neg Hx          Medications have been verified.        Objective   BP (!) 174/73   Pulse 64   Temp 97.7 °F (36.5 °C) (Temporal)   Resp 18   Ht 5' 3\" (1.6 m)   Wt 136 kg (300 lb)   SpO2 96%   BMI 53.14 " kg/m²   No LMP recorded. Patient is postmenopausal.       Physical Exam     Physical Exam  Pulmonary:      Effort: Pulmonary effort is normal.   Skin:     Findings: Erythema and rash present.      Comments: Left upper back/shoulder, upper chest with scattered, erythematous, maculopapular/vesicular lesions in dermatomal (x2) distribution.  Scant weeping.  Tender to touch.    No rash/lesions noted on right side or elsewhere.     Neurological:      Mental Status: She is alert.   Psychiatric:         Mood and Affect: Mood normal.

## 2024-01-29 DIAGNOSIS — F51.01 PRIMARY INSOMNIA: ICD-10-CM

## 2024-01-29 DIAGNOSIS — M79.7 FIBROMYALGIA, PRIMARY: ICD-10-CM

## 2024-01-29 RX ORDER — GABAPENTIN 400 MG/1
400 CAPSULE ORAL 3 TIMES DAILY
Qty: 270 CAPSULE | Refills: 0 | Status: SHIPPED | OUTPATIENT
Start: 2024-01-29

## 2024-01-30 RX ORDER — ZOLPIDEM TARTRATE 5 MG/1
5 TABLET ORAL
Qty: 30 TABLET | Refills: 0 | Status: SHIPPED | OUTPATIENT
Start: 2024-01-30

## 2024-01-31 ENCOUNTER — OFFICE VISIT (OUTPATIENT)
Dept: OBGYN CLINIC | Facility: CLINIC | Age: 64
End: 2024-01-31
Payer: COMMERCIAL

## 2024-01-31 VITALS — HEIGHT: 63 IN | WEIGHT: 293 LBS | BODY MASS INDEX: 51.91 KG/M2

## 2024-01-31 DIAGNOSIS — M65.321 TRIGGER INDEX FINGER OF RIGHT HAND: ICD-10-CM

## 2024-01-31 DIAGNOSIS — M65.331 TRIGGER MIDDLE FINGER OF RIGHT HAND: Primary | ICD-10-CM

## 2024-01-31 PROCEDURE — 99204 OFFICE O/P NEW MOD 45 MIN: CPT | Performed by: SURGERY

## 2024-01-31 PROCEDURE — 20550 NJX 1 TENDON SHEATH/LIGAMENT: CPT | Performed by: SURGERY

## 2024-01-31 RX ORDER — LIDOCAINE HYDROCHLORIDE 10 MG/ML
1 INJECTION, SOLUTION INFILTRATION; PERINEURAL
Status: COMPLETED | OUTPATIENT
Start: 2024-01-31 | End: 2024-01-31

## 2024-01-31 RX ORDER — DEXAMETHASONE SODIUM PHOSPHATE 10 MG/ML
40 INJECTION, SOLUTION INTRAMUSCULAR; INTRAVENOUS
Status: COMPLETED | OUTPATIENT
Start: 2024-01-31 | End: 2024-01-31

## 2024-01-31 RX ADMIN — DEXAMETHASONE SODIUM PHOSPHATE 40 MG: 10 INJECTION, SOLUTION INTRAMUSCULAR; INTRAVENOUS at 14:00

## 2024-01-31 RX ADMIN — LIDOCAINE HYDROCHLORIDE 1 ML: 10 INJECTION, SOLUTION INFILTRATION; PERINEURAL at 14:00

## 2024-01-31 NOTE — PROGRESS NOTES
Irina Clay M.D.  Attending, Orthopaedic Surgery  Hand, Wrist, and Elbow Surgery  [facility]      ORTHOPAEDIC HAND, WRIST, AND ELBOW OFFICE  VISIT       ASSESSMENT/PLAN:      63 y.o. female with right index and long finger trigger fingers     The etiology of above diagnosis was discussed along with treatment options. The decision was made to proceed with an initial right long trigger finger CSI. Right long trigger finger CSI was performed in the office without complication. Post injection protocol/expectations were reviewed. The CSI may be repeated in 6 weeks time if the first was beneficial for her. We also discussed a trigger finger release if symptoms worsen or fail to improve. She may need formal therapy post operatively due to stiffness. We discussed surgery will fix the finger from locking but will not improve motion. She also likely has some underlying arthritis. I will see her back in 2 weeks time for a possible initial right index finger trigger finger CSI.     The patient verbalized understanding of exam findings and treatment plan. We engaged in the shared decision-making process and treatment options were discussed at length with the patient. Surgical and conservative management discussed today along with risks and benefits.    Diagnoses and all orders for this visit:    Trigger middle finger of right hand    Trigger index finger of right hand      Follow Up:  Return in about 2 weeks (around 2/14/2024).    To Do Next Visit:  Re-evaluation of current issue      General Discussions:  Trigger Finger: The anatomy and physiology of trigger finger was discussed with the patient today in the office.  Edema and increased contact pressure within the flexor tendons at the A1 pulley can cause pain, crepitation, and triggering or locking of the digit resulting in limitation of function.  Symptoms can occur at anytime but are typically worse in the morning or after a brief rest from  repetitive activity.  Treatment options include resting/nighttime MP blocking splints to decrease edema, oral anti-inflammatory medications, home or formal therapy exercises, up to 2 steroid injections within the tendon sheath, or surgical release.  While majority of patients do respond to conservative treatment, up to 20% may require surgical release.     ____________________________________________________________________________________________________________________________________________      CHIEF COMPLAINT:  Chief Complaint   Patient presents with    Right Hand - Locking, Pain     Patient is having pain in her palm and locking in her first two fingers. CTR around 25 years ago. She does wear a brace at night when sleeping       SUBJECTIVE:  Silvia Antonio is a 63 y.o. year old RHD female who presents to the office for right hand pain. She notes pain to her palm as well as locking of her index and long fingers on her right hand. This has been ongoing for a few months, but she feels her symptoms are progressing. She will wear a compression glove and notes this does improve her symptoms. She has a history of a right carpal tunnel release 25 years ago. She denies associated numbness and tingling. She feels her hand is tight and weak.      Pain/symptom timing:  Worse during the day when active  Pain/symptom context:  Worse with activites and work  Pain/symptom modifying factors:  Rest makes better, activities make worse  Pain/symptom associated signs/symptoms: none    Prior treatment   NSAIDsNo   Injections No   Bracing/Orthotics Yes    Physical Therapy No     I have personally reviewed all the relevant PMH, PSH, SH, FH, Medications and allergies      PAST MEDICAL HISTORY:  Past Medical History:   Diagnosis Date    Anemia     Anxiety     Arthritis     Fibromyalgia     Headache(784.0)     High blood pressure     Hypertension     Obesity     Panic attacks     Shingles        PAST SURGICAL HISTORY:  Past Surgical  History:   Procedure Laterality Date    CHOLECYSTECTOMY      HERNIA REPAIR      KNEE SURGERY      HERIBERTO-EN-Y PROCEDURE      SHOULDER OPEN ROTATOR CUFF REPAIR      SPINAL FUSION      TUBAL LIGATION         FAMILY HISTORY:  Family History   Problem Relation Age of Onset    Hypertension Mother     COPD Mother     Heart attack Father         late 50s     Arthritis Father     Pulmonary embolism Father     No Known Problems Sister     Hypertension Brother     Alcohol abuse Brother     No Known Problems Maternal Grandmother     No Known Problems Paternal Grandmother     No Known Problems Maternal Aunt     No Known Problems Paternal Aunt     Breast cancer Neg Hx        SOCIAL HISTORY:  Social History     Tobacco Use    Smoking status: Former     Current packs/day: 0.00     Average packs/day: 0.5 packs/day for 37.4 years (18.7 ttl pk-yrs)     Types: Cigarettes     Start date: 1980     Quit date: 2017     Years since quittin.6    Smokeless tobacco: Never   Vaping Use    Vaping status: Never Used   Substance Use Topics    Alcohol use: Yes     Alcohol/week: 3.0 standard drinks of alcohol     Types: 3 Cans of beer per week     Comment: 3 per week    Drug use: Never     Comment: Illicit drugs:   no  - As per [place]        MEDICATIONS:    Current Outpatient Medications:     bisoprolol-hydrochlorothiazide (ZIAC) 10-6.25 MG per tablet, Take 1 tablet by mouth daily, Disp: 90 tablet, Rfl: 1    DULoxetine (CYMBALTA) 30 mg delayed release capsule, Take 1 capsule (30 mg total) by mouth daily, Disp: 90 capsule, Rfl: 1    DULoxetine (CYMBALTA) 60 mg delayed release capsule, Take 1 capsule (60 mg total) by mouth daily, Disp: 90 capsule, Rfl: 1    ferrous sulfate 325 (65 Fe) mg tablet, Take 325 mg by mouth daily with breakfast, Disp: , Rfl:     gabapentin (NEURONTIN) 400 mg capsule, Take 1 capsule (400 mg total) by mouth 3 (three) times a day, Disp: 270 capsule, Rfl: 0    zolpidem (AMBIEN) 5 mg tablet, Take 1 tablet (5 mg total)  by mouth daily at bedtime as needed for sleep, Disp: 30 tablet, Rfl: 0    benzonatate (TESSALON) 200 MG capsule, Take 1 capsule (200 mg total) by mouth 3 (three) times a day as needed for cough, Disp: 20 capsule, Rfl: 0    senna (SENOKOT) 8.6 MG tablet, Take 1 tablet by mouth 2 (two) times a day, Disp: , Rfl:     valACYclovir (VALTREX) 1,000 mg tablet, Take 1 tablet (1,000 mg total) by mouth 3 (three) times a day for 7 days, Disp: 21 tablet, Rfl: 0    ALLERGIES:  No Known Allergies        REVIEW OF SYSTEMS:  Review of Systems   Constitutional:  Negative for chills, fever and unexpected weight change.   HENT:  Negative for hearing loss, nosebleeds and sore throat.    Eyes:  Negative for pain, redness and visual disturbance.   Respiratory:  Negative for cough, shortness of breath and wheezing.    Cardiovascular:  Negative for chest pain, palpitations and leg swelling.   Gastrointestinal:  Negative for abdominal pain, nausea and vomiting.   Endocrine: Negative for polydipsia and polyuria.   Genitourinary:  Negative for difficulty urinating and hematuria.   Musculoskeletal:  Negative for arthralgias, joint swelling and myalgias.   Skin:  Negative for rash and wound.   Neurological:  Negative for dizziness, numbness and headaches.   Psychiatric/Behavioral:  Negative for decreased concentration, dysphoric mood and suicidal ideas. The patient is not nervous/anxious.        VITALS:  Vitals:       LABS:  HgA1c:   Lab Results   Component Value Date    HGBA1C 5.8 (H) 01/10/2024     BMP:   Lab Results   Component Value Date    CALCIUM 9.2 09/08/2023    K 4.3 09/08/2023    CO2 28 09/08/2023     09/08/2023    BUN 17 09/08/2023    CREATININE 0.91 09/08/2023       _____________________________________________________  PHYSICAL EXAMINATION:  General: well developed and well nourished, alert, oriented times 3, and appears comfortable  Psychiatric: Normal  HEENT: Normocephalic, Atraumatic Trachea Midline, No  "torticollis  Pulmonary: No audible wheezing or respiratory distress   Abdomen/GI: Non tender, non distended   Cardiovascular: No pitting edema, 2+ radial pulse   Skin: No masses, erythema, lacerations, fluctation, ulcerations  Neurovascular: Sensation Intact to the Median, Ulnar, Radial Nerve, Motor Intact to the Median, Ulnar, Radial Nerve, and Pulses Intact  Musculoskeletal: Normal, except as noted in detailed exam and in HPI.      MUSCULOSKELETAL EXAMINATION:    right index and long finger:  Negative palpable nodule over the A1 pulley.  Positive tenderness to palpation over A1 pulley, more to the long finger. Negative catching. Positive clicking. Slight PIP flexion contracture of the long finger.     Right Carpal Tunnel Exam:  Negative thenar atrophy. Negative phalen's test. Negative carpal tunnel compression. Negative tinels over median nerve at the wrist.  Opposition strength 5/5.  Abduction strength 5/5. Well healed carpal tunnel release incision.     ___________________________________________________  STUDIES REVIEWED:  No imaging to review           PROCEDURES PERFORMED:  Hand/upper extremity injection: R long A1  Universal Protocol:  Consent: Verbal consent obtained. Written consent not obtained.  Risks and benefits: risks, benefits and alternatives were discussed  Consent given by: patient  Time out: Immediately prior to procedure a \"time out\" was called to verify the correct patient, procedure, equipment, support staff and site/side marked as required.  Patient understanding: patient states understanding of the procedure being performed  Site marked: the operative site was marked  Patient identity confirmed: verbally with patient  Supporting Documentation  Indications: pain   Procedure Details  Condition:trigger finger Location: long finger - R long A1   Preparation: Patient was prepped and draped in the usual sterile fashion  Needle size: 25 G  Ultrasound guidance: no  Medications administered: 1 mL " lidocaine 1 %; 40 mg dexamethasone 100 mg/10 mL  Patient tolerance: patient tolerated the procedure well with no immediate complications  Dressing:  Sterile dressing applied            _____________________________________________________      Scribe Attestation      I,:  Silva Mello am acting as a scribe while in the presence of the attending physician.:       I,:  Irina Clay MD personally performed the services described in this documentation    as scribed in my presence.:

## 2024-02-14 ENCOUNTER — OFFICE VISIT (OUTPATIENT)
Dept: OBGYN CLINIC | Facility: CLINIC | Age: 64
End: 2024-02-14
Payer: COMMERCIAL

## 2024-02-14 VITALS — BODY MASS INDEX: 51.91 KG/M2 | WEIGHT: 293 LBS | HEIGHT: 63 IN

## 2024-02-14 DIAGNOSIS — M65.331 TRIGGER MIDDLE FINGER OF RIGHT HAND: Primary | ICD-10-CM

## 2024-02-14 DIAGNOSIS — M65.321 TRIGGER INDEX FINGER OF RIGHT HAND: ICD-10-CM

## 2024-02-14 PROCEDURE — 20550 NJX 1 TENDON SHEATH/LIGAMENT: CPT | Performed by: SURGERY

## 2024-02-14 PROCEDURE — 99214 OFFICE O/P EST MOD 30 MIN: CPT | Performed by: SURGERY

## 2024-02-14 RX ORDER — DEXAMETHASONE SODIUM PHOSPHATE 10 MG/ML
40 INJECTION, SOLUTION INTRAMUSCULAR; INTRAVENOUS
Status: COMPLETED | OUTPATIENT
Start: 2024-02-14 | End: 2024-02-14

## 2024-02-14 RX ORDER — LIDOCAINE HYDROCHLORIDE 10 MG/ML
1 INJECTION, SOLUTION INFILTRATION; PERINEURAL
Status: COMPLETED | OUTPATIENT
Start: 2024-02-14 | End: 2024-02-14

## 2024-02-14 RX ADMIN — DEXAMETHASONE SODIUM PHOSPHATE 40 MG: 10 INJECTION, SOLUTION INTRAMUSCULAR; INTRAVENOUS at 16:00

## 2024-02-14 RX ADMIN — LIDOCAINE HYDROCHLORIDE 1 ML: 10 INJECTION, SOLUTION INFILTRATION; PERINEURAL at 16:00

## 2024-02-14 NOTE — PROGRESS NOTES
ASSESSMENT/PLAN:      63 y.o. female with right index and long trigger fingers     An initial right index trigger finger CSI was performed in the office without complication. Post injection protocol/expectations were reviewed. A 2nd right index trigger finger CSI may be performed in 6 weeks time, if the first was beneficial for her. A 2nd right long trigger finger CSI may be performed in 4 weeks time as she would like to exhaust all other options prior to surgery. We discussed surgery would be performed at [place] and she may attend OT post operatively to work on ROM as she does have a slight long finger PIP flexion contracture from long standing trigger finger and likely underlying arthritis. Follow up in 4 weeks time for a 2nd right long trigger finger CSI.     The patient verbalized understanding of exam findings and treatment plan. We engaged in the shared decision-making process and treatment options were discussed at length with the patient. Surgical and conservative management discussed today along with risks and benefits.    Diagnoses and all orders for this visit:    Trigger middle finger of right hand    Trigger index finger of right hand  -     Hand/upper extremity injection: R index A1      Follow Up:  Return in about 4 weeks (around 3/13/2024).      To Do Next Visit:  Re-evaluation of current issue    ____________________________________________________________________________________________________________________________________________      CHIEF COMPLAINT:  Chief Complaint   Patient presents with    Follow-up     Patient says the injection only lasted a couple days. Patient would like to talk about options       SUBJECTIVE:  Silvia Antonio is a 63 y.o. year old RHD female who presents to the office for a follow up regarding right index and long trigger fingers. She underwent a right long trigger finger CSI on 1/31/24, which was partially beneficial for her. She notes the injection was initially  helpful for the first few days, but feels her long finger is starting to catch and lock and become painful. She notes her right index finger is locking less frequently, but she has pain to her A1 pulley.        I have personally reviewed all the relevant PMH, PSH, SH, FH, Medications and allergies.     PAST MEDICAL HISTORY:  Past Medical History:   Diagnosis Date    Anemia     Anxiety     Arthritis     Fibromyalgia     Headache(784.0)     High blood pressure     Hypertension     Obesity     Panic attacks     Shingles        PAST SURGICAL HISTORY:  Past Surgical History:   Procedure Laterality Date    CHOLECYSTECTOMY      HERNIA REPAIR      KNEE SURGERY      HERIBERTO-EN-Y PROCEDURE      SHOULDER OPEN ROTATOR CUFF REPAIR      SPINAL FUSION      TUBAL LIGATION         FAMILY HISTORY:  Family History   Problem Relation Age of Onset    Hypertension Mother     COPD Mother     Heart attack Father         late 50s     Arthritis Father     Pulmonary embolism Father     No Known Problems Sister     Hypertension Brother     Alcohol abuse Brother     No Known Problems Maternal Grandmother     No Known Problems Paternal Grandmother     No Known Problems Maternal Aunt     No Known Problems Paternal Aunt     Breast cancer Neg Hx        SOCIAL HISTORY:  Social History     Tobacco Use    Smoking status: Former     Current packs/day: 0.00     Average packs/day: 0.5 packs/day for 37.4 years (18.7 ttl pk-yrs)     Types: Cigarettes     Start date: 1980     Quit date: 2017     Years since quittin.7    Smokeless tobacco: Never   Vaping Use    Vaping status: Never Used   Substance Use Topics    Alcohol use: Yes     Alcohol/week: 3.0 standard drinks of alcohol     Types: 3 Cans of beer per week     Comment: 3 per week    Drug use: Never     Comment: Illicit drugs:   no  - As per [place]        MEDICATIONS:    Current Outpatient Medications:     bisoprolol-hydrochlorothiazide (ZIAC) 10-6.25 MG per tablet, Take 1 tablet by mouth daily,  Disp: 90 tablet, Rfl: 1    DULoxetine (CYMBALTA) 30 mg delayed release capsule, Take 1 capsule (30 mg total) by mouth daily, Disp: 90 capsule, Rfl: 1    DULoxetine (CYMBALTA) 60 mg delayed release capsule, Take 1 capsule (60 mg total) by mouth daily, Disp: 90 capsule, Rfl: 1    ferrous sulfate 325 (65 Fe) mg tablet, Take 325 mg by mouth daily with breakfast, Disp: , Rfl:     gabapentin (NEURONTIN) 400 mg capsule, Take 1 capsule (400 mg total) by mouth 3 (three) times a day, Disp: 270 capsule, Rfl: 0    zolpidem (AMBIEN) 5 mg tablet, Take 1 tablet (5 mg total) by mouth daily at bedtime as needed for sleep, Disp: 30 tablet, Rfl: 0    benzonatate (TESSALON) 200 MG capsule, Take 1 capsule (200 mg total) by mouth 3 (three) times a day as needed for cough, Disp: 20 capsule, Rfl: 0    senna (SENOKOT) 8.6 MG tablet, Take 1 tablet by mouth 2 (two) times a day, Disp: , Rfl:     valACYclovir (VALTREX) 1,000 mg tablet, Take 1 tablet (1,000 mg total) by mouth 3 (three) times a day for 7 days, Disp: 21 tablet, Rfl: 0    ALLERGIES:  No Known Allergies    REVIEW OF SYSTEMS:  Review of Systems   Constitutional:  Negative for chills, fever and unexpected weight change.   HENT:  Negative for hearing loss, nosebleeds and sore throat.    Eyes:  Negative for pain, redness and visual disturbance.   Respiratory:  Negative for cough, shortness of breath and wheezing.    Cardiovascular:  Negative for chest pain, palpitations and leg swelling.   Gastrointestinal:  Negative for abdominal pain, nausea and vomiting.   Endocrine: Negative for polydipsia and polyuria.   Genitourinary:  Negative for difficulty urinating and hematuria.   Musculoskeletal:  Negative for arthralgias, joint swelling and myalgias.   Skin:  Negative for rash and wound.   Neurological:  Negative for dizziness, numbness and headaches.   Psychiatric/Behavioral:  Negative for decreased concentration, dysphoric mood and suicidal ideas. The patient is not nervous/anxious.   "      VITALS:  Vitals:       LABS:  HgA1c:   Lab Results   Component Value Date    HGBA1C 5.8 (H) 01/10/2024     BMP:   Lab Results   Component Value Date    CALCIUM 9.2 09/08/2023    K 4.3 09/08/2023    CO2 28 09/08/2023     09/08/2023    BUN 17 09/08/2023    CREATININE 0.91 09/08/2023       _____________________________________________________  PHYSICAL EXAMINATION:  General: well developed and well nourished, alert, oriented times 3, and appears comfortable  Psychiatric: Normal  HEENT: Normocephalic, Atraumatic Trachea Midline, No torticollis  Pulmonary: No audible wheezing or respiratory distress   Cardiovascular: No pitting edema, 2+ radial pulse   Abdominal/GI: abdomen non tender, non distended   Skin: No masses, erythema, lacerations, fluctation, ulcerations  Neurovascular: Sensation Intact to the Median, Ulnar, Radial Nerve, Motor Intact to the Median, Ulnar, Radial Nerve, and Pulses Intact  Musculoskeletal: Normal, except as noted in detailed exam and in HPI.      MUSCULOSKELETAL EXAMINATION:    right index finger:  Negative palpable nodule over the A1 pulley.  Positive tenderness to palpation over A1 pulley. Negative catching. Positive clicking.      right long finger:  Negative palpable nodule over the A1 pulley.  Positive tenderness to palpation over A1 pulley. Negative catching. Positive clicking. Slight PIP flexion contracture.     ___________________________________________________  STUDIES REVIEWED:  No new imaging to review           PROCEDURES PERFORMED:  Hand/upper extremity injection: R index A1  Universal Protocol:  Consent: Verbal consent obtained. Written consent not obtained.  Risks and benefits: risks, benefits and alternatives were discussed  Consent given by: patient  Time out: Immediately prior to procedure a \"time out\" was called to verify the correct patient, procedure, equipment, support staff and site/side marked as required.  Patient understanding: patient states understanding " of the procedure being performed  Site marked: the operative site was marked  Patient identity confirmed: verbally with patient  Supporting Documentation  Indications: pain   Procedure Details  Condition:trigger finger Location: index finger - R index A1   Preparation: Patient was prepped and draped in the usual sterile fashion  Needle size: 25 G  Ultrasound guidance: no  Medications administered: 1 mL lidocaine 1 %; 40 mg dexamethasone 100 mg/10 mL  Patient tolerance: patient tolerated the procedure well with no immediate complications  Dressing:  Sterile dressing applied            _____________________________________________________      Scribe Attestation      I,:  Silva Mello am acting as a scribe while in the presence of the attending physician.:       I,:  Irina Clay MD personally performed the services described in this documentation    as scribed in my presence.:

## 2024-03-05 ENCOUNTER — TELEPHONE (OUTPATIENT)
Dept: HEMATOLOGY ONCOLOGY | Facility: CLINIC | Age: 64
End: 2024-03-05

## 2024-03-05 ENCOUNTER — OFFICE VISIT (OUTPATIENT)
Dept: HEMATOLOGY ONCOLOGY | Facility: CLINIC | Age: 64
End: 2024-03-05
Payer: COMMERCIAL

## 2024-03-05 VITALS
OXYGEN SATURATION: 97 % | SYSTOLIC BLOOD PRESSURE: 152 MMHG | HEIGHT: 63 IN | TEMPERATURE: 98 F | BODY MASS INDEX: 51.91 KG/M2 | RESPIRATION RATE: 17 BRPM | HEART RATE: 72 BPM | WEIGHT: 293 LBS | DIASTOLIC BLOOD PRESSURE: 86 MMHG

## 2024-03-05 DIAGNOSIS — E53.8 B12 DEFICIENCY: ICD-10-CM

## 2024-03-05 DIAGNOSIS — G25.81 RESTLESS LEG SYNDROME: Primary | ICD-10-CM

## 2024-03-05 DIAGNOSIS — Z90.3 POSTGASTRECTOMY MALABSORPTION: ICD-10-CM

## 2024-03-05 DIAGNOSIS — Z53.20 COLONOSCOPY REFUSED: ICD-10-CM

## 2024-03-05 DIAGNOSIS — E61.1 HYPOFERREMIA: ICD-10-CM

## 2024-03-05 DIAGNOSIS — K91.2 POSTGASTRECTOMY MALABSORPTION: ICD-10-CM

## 2024-03-05 PROCEDURE — 99244 OFF/OP CNSLTJ NEW/EST MOD 40: CPT | Performed by: PHYSICIAN ASSISTANT

## 2024-03-05 RX ORDER — CYANOCOBALAMIN 1000 UG/ML
1000 INJECTION, SOLUTION INTRAMUSCULAR; SUBCUTANEOUS ONCE
OUTPATIENT
Start: 2024-03-19 | End: 2024-03-19

## 2024-03-05 RX ORDER — SODIUM CHLORIDE 9 MG/ML
20 INJECTION, SOLUTION INTRAVENOUS ONCE
OUTPATIENT
Start: 2024-03-19

## 2024-03-05 NOTE — PROGRESS NOTES
[address] [facility]ATOLOGY [facility] [place]  [address]  [place] [address]  Hematology Ambulatory Follow-Up  Silvia Antonio, 1960, [id number]  3/5/2024      Assessment and Plan   1. Restless leg syndrome; 2. Hypoferremia; 3. Postgastrectomy malabsorption; 4. B12 deficiency    Patient is a pleasant 63 year old family with history for bariatric surgery 30 years ago and iron deficiency. Patient reports currently she has extreme fatigue and restless legs.     Patients was educated; individuals with known history of bariatric surgery often have trouble absorbing nutrients such as B 12, Folate and Iron. Due to absorption issues taking OTC oral FE may not be enough and cause patients to suffer from constipation and prolonged symptoms. Patient was told IV Iron can improve Fe sat and Ferritin levels. Patient's with rest legs syndrome often require a ferritin above 100 to help alleviate or decrease symptoms.     Patient was told at this time I recommend she discontinue Oral Fe and start IV iron.  IV iron was discussed: Venofer.  We will start with IV venofer 200 mg + B12 IM once a week for 7 weeks.   We discussed potential side effects which include but are not limited to IV site reactions, tattooing, hypotension, arthralgias, headache, nausea, and anaphylaxis.  If patient experiences any side effects they are to call the office to discuss premedications are necessary for subsequent dosing.     We will order repeat Iron panel for 3 month follow up.    Folate and B 12 deficiency may be caused by history of bariatric surgery.  I recommend she start Natures Made Extra strength 3000 mcg B 12 gummies. Patient was told to take two gummies a day for 2 months and then start 1 gummy daily after. We will order Folate and B12 labs for follow up visit.     Patient was also educated on Bariatric fusion protein powder. Patient was educated this can help increase nutrients they need and/or have  trouble absorbing due to history of bariatric surgery. Patient was also told at this time we recommend she follow up with Bariatric provider to have possible vitamin deficiency evaluated.     At time we also encouraged patient to have a Colonoscopy. At this time, she does not want a colonoscopy.     Patient was told to send us a message on [employer] if patients brother tests positive for hereditary hematochromatosis. Patient was educated on hereditary hematochromatosis and told if brother test positive, we will order further testing for her.   Additional counseling was provided regarding dietary sources of iron.  These may need to be avoided if the patient does not fact have hereditary hemochromatosis.    - Iron Panel (Includes Ferritin, Iron Sat%, Iron, and TIBC); Future  - CBC and differential; Future  - Vitamin B12; Future  - Folate; Future  - Iron Panel (Includes Ferritin, Iron Sat%, Iron, and TIBC); Future  - CBC and differential; Future  - Vitamin B12; Future  - Folate; Future    The patient is scheduled for follow-up in approximately 3 months with repeat blood work, virtually with Randee Rios PA-C.  Patient  voiced agreement and understanding to the above.   Patient advised to call the Hematology/Oncology office with any questions and concerns regarding the above.    Barrier(s) to care: None  The patient is  able to self care.    RAIN Navarrete  Medical Oncology/Hematology  [facility]    Subjective     Chief Complaint   Patient presents with    Follow-up       History of present illness: Patient is a 63 y.o. female who presents today with significant PMH for fatigue, neuropathy, chronic pain syndrome, prior back surgery, restless leg syndrome, Fibromyalgia, essential HTN and history of Iron deficiency. Patient was referred by PCP for history of iron deficiency.  Admits current extreme fatigue, not feeling rested, restless legs and constipation. Denies any headache or blood in  stool. Last menstruation 2014 and denies any vaginal bleeding.    Admits history of bypass surgery over 30 years ago. Patient is unsure if she had the gastric sleeve or Yoanna- en-Y. Patient reports she has always suffered from low Iron and was given IV Iron in 2012, that she tolerated well. Patient is currently taking OTC Iron twice a day but reports this causes constipation and is currently taking a stool softer due to constipation.     Patient had a cologuard 2022 that was negative but reports she does not want a colonoscopy at this time. Denies any family history of colon cancer. Patient reports her brother recently was seen by his physician for possible iron over load and is being tested for possible hereditary hematochromatosis. Mammogram is up to date.    1/10/24: Hg 13.1, HCT 41.4, MCV 84, RDW 17.6, Platlets 254  FE SAT 12, TIBC 425, and Ferritin -14    9/8/23- Hg- 11.4, HCT 38.1, MCV 80 RDW 17, Platlets 278   FE SAT 11, TIBC 427    Cologuard 8/31/22 - negative.     10/13/22- HG 11.3, HCT 36.1, MCV 78, RDW 17.1,Platelets 274   FE SAT 10, TIBC 427    Review of Systems   Constitutional:  Positive for fatigue.   Respiratory: Negative.     Cardiovascular: Negative.    Gastrointestinal:  Positive for constipation.   Genitourinary:  Negative for menstrual problem.   Musculoskeletal:         RLS   Skin: Negative.    Neurological:  Negative for headaches.   Psychiatric/Behavioral:  Positive for sleep disturbance.        Patient Active Problem List   Diagnosis    Breast lump on right side at 10 o'clock position    Essential hypertension    Generalized anxiety disorder    Fibromyalgia, primary    Chronic bilateral low back pain without sciatica    Facet hypertrophy of lumbar region    Displacement of lumbar intervertebral disc    Spinal stenosis of lumbar region with neurogenic claudication    Annual physical exam    Vitamin D deficiency    Class 3 severe obesity due to excess calories with serious comorbidity and body  mass index (BMI) of 50.0 to 59.9 in adult (HCC)    Prediabetes    Sacroiliac joint dysfunction of both sides    Piriformis syndrome of left side    Chronic pain syndrome    Pre-op exam    Heart murmur    Nonrheumatic aortic valve insufficiency    Primary insomnia    Iron deficiency anemia    Neuropathy    Other fatigue    Hypoferremia    B12 deficiency    Restless leg syndrome     Past Medical History:   Diagnosis Date    Anemia     Anxiety     Arthritis     Fibromyalgia     Headache(784.0)     High blood pressure     Hypertension     Obesity     Panic attacks     Shingles      Past Surgical History:   Procedure Laterality Date    CHOLECYSTECTOMY      HERNIA REPAIR      KNEE SURGERY      HERIBERTO-EN-Y PROCEDURE      SHOULDER OPEN ROTATOR CUFF REPAIR      SPINAL FUSION      TUBAL LIGATION       Family History   Problem Relation Age of Onset    Hypertension Mother     COPD Mother     Heart attack Father         late 50s     Arthritis Father     Pulmonary embolism Father     No Known Problems Sister     Hypertension Brother     Alcohol abuse Brother     No Known Problems Maternal Grandmother     No Known Problems Paternal Grandmother     No Known Problems Maternal Aunt     No Known Problems Paternal Aunt     Breast cancer Neg Hx      Social History     Socioeconomic History    Marital status: /Civil Union     Spouse name: None    Number of children: None    Years of education: 12    Highest education level: None   Occupational History    None   Tobacco Use    Smoking status: Former     Current packs/day: 0.00     Average packs/day: 0.5 packs/day for 37.4 years (18.7 ttl pk-yrs)     Types: Cigarettes     Start date: 1980     Quit date: 2017     Years since quittin.7    Smokeless tobacco: Never   Vaping Use    Vaping status: Never Used   Substance and Sexual Activity    Alcohol use: Yes     Alcohol/week: 3.0 standard drinks of alcohol     Types: 3 Cans of beer per week     Comment: 3 per week    Drug use:  Never     Comment: Illicit drugs:   no  - As per [place]     Sexual activity: Yes     Partners: Male     Birth control/protection: None   Other Topics Concern    None   Social History Narrative    · Most recent tobacco use screenin2017      · Do you currently or have you served in the [employer]:   No      · Were you activated, into active duty, as a member of the [employer] or as a Reservist:   No      · Exercise level:   Moderate      · Caffeine intake:   Moderate  2-3 cups a day     · Seat belts used routinely:   Yes      · Smoke alarm in home:   Yes      · Advance directive:   Yes      · Live alone or with others:   with others      · Are there stairs in your home:   Yes      · Pets:   Yes     As per A[place]ia      Social Determinants of Health     Financial Resource Strain: Not on file   Food Insecurity: Not on file   Transportation Needs: Not on file   Physical Activity: Not on file   Stress: Not on file   Social Connections: Not on file   Intimate Partner Violence: Not on file   Housing Stability: Not on file       Current Outpatient Medications:     bisoprolol-hydrochlorothiazide (ZIAC) 10-6.25 MG per tablet, Take 1 tablet by mouth daily, Disp: 90 tablet, Rfl: 1    DULoxetine (CYMBALTA) 30 mg delayed release capsule, Take 1 capsule (30 mg total) by mouth daily, Disp: 90 capsule, Rfl: 1    DULoxetine (CYMBALTA) 60 mg delayed release capsule, Take 1 capsule (60 mg total) by mouth daily, Disp: 90 capsule, Rfl: 1    ferrous sulfate 325 (65 Fe) mg tablet, Take 325 mg by mouth daily with breakfast, Disp: , Rfl:     gabapentin (NEURONTIN) 400 mg capsule, Take 1 capsule (400 mg total) by mouth 3 (three) times a day, Disp: 270 capsule, Rfl: 0    zolpidem (AMBIEN) 5 mg tablet, Take 1 tablet (5 mg total) by mouth daily at bedtime as needed for sleep, Disp: 30 tablet, Rfl: 0    benzonatate (TESSALON) 200 MG capsule, Take 1 capsule (200 mg total) by mouth 3 (three) times a day as needed for cough, Disp:  "20 capsule, Rfl: 0    senna (SENOKOT) 8.6 MG tablet, Take 1 tablet by mouth 2 (two) times a day, Disp: , Rfl:     valACYclovir (VALTREX) 1,000 mg tablet, Take 1 tablet (1,000 mg total) by mouth 3 (three) times a day for 7 days, Disp: 21 tablet, Rfl: 0  No Known Allergies    Objective   /86 (BP Location: Right arm, Patient Position: Sitting, Cuff Size: Adult)   Pulse 72   Temp 98 °F (36.7 °C) (Temporal)   Resp 17   Ht 5' 3\" (1.6 m)   Wt (!) 142 kg (313 lb)   SpO2 97%   BMI 55.45 kg/m²    Physical Exam  Vitals reviewed.   Constitutional:       Appearance: She is obese.   HENT:      Head: Normocephalic.   Eyes:      Pupils: Pupils are equal, round, and reactive to light.   Cardiovascular:      Rate and Rhythm: Normal rate and regular rhythm.      Heart sounds: Normal heart sounds. No murmur heard.  Pulmonary:      Breath sounds: Normal breath sounds. No wheezing.   Abdominal:      Palpations: Abdomen is soft.      Tenderness: There is no abdominal tenderness.   Lymphadenopathy:      Cervical: No cervical adenopathy.   Neurological:      Mental Status: She is alert and oriented to person, place, and time.   Psychiatric:         Mood and Affect: Mood normal.         Behavior: Behavior normal.         Result Review  Appointment on 01/10/2024   Component Date Value Ref Range Status    Vit D, 25-Hydroxy 01/10/2024 41.5  30.0 - 100.0 ng/mL Final    Vitamin D guidelines established by Clinical Guidelines Subcommittee  of the Endocrine Society Task Force, 2011    Deficiency <20ng/ml   Insufficiency 20-30ng/ml   Sufficient  ng/ml     Hemoglobin A1C 01/10/2024 5.8 (H)  Normal 4.0-5.6%; PreDiabetic 5.7-6.4%; Diabetic >=6.5%; Glycemic control for adults with diabetes <7.0% % Final    EAG 01/10/2024 120  mg/dl Final    WBC 01/10/2024 8.57  4.31 - 10.16 Thousand/uL Final    RBC 01/10/2024 4.93  3.81 - 5.12 Million/uL Final    Hemoglobin 01/10/2024 13.1  11.5 - 15.4 g/dL Final    Hematocrit 01/10/2024 41.4  34.8 - " 46.1 % Final    MCV 01/10/2024 84  82 - 98 fL Final    MCH 01/10/2024 26.6 (L)  26.8 - 34.3 pg Final    MCHC 01/10/2024 31.6  31.4 - 37.4 g/dL Final    RDW 01/10/2024 17.6 (H)  11.6 - 15.1 % Final    MPV 01/10/2024 10.2  8.9 - 12.7 fL Final    Platelets 01/10/2024 254  149 - 390 Thousands/uL Final    nRBC 01/10/2024 0  /100 WBCs Final    Neutrophils Relative 01/10/2024 65  43 - 75 % Final    Immat GRANS % 01/10/2024 0  0 - 2 % Final    Lymphocytes Relative 01/10/2024 23  14 - 44 % Final    Monocytes Relative 01/10/2024 6  4 - 12 % Final    Eosinophils Relative 01/10/2024 5  0 - 6 % Final    Basophils Relative 01/10/2024 1  0 - 1 % Final    Neutrophils Absolute 01/10/2024 5.60  1.85 - 7.62 Thousands/µL Final    Immature Grans Absolute 01/10/2024 0.02  0.00 - 0.20 Thousand/uL Final    Lymphocytes Absolute 01/10/2024 1.97  0.60 - 4.47 Thousands/µL Final    Monocytes Absolute 01/10/2024 0.52  0.17 - 1.22 Thousand/µL Final    Eosinophils Absolute 01/10/2024 0.42  0.00 - 0.61 Thousand/µL Final    Basophils Absolute 01/10/2024 0.04  0.00 - 0.10 Thousands/µL Final    Iron Saturation 01/10/2024 12 (L)  15 - 50 % Final    TIBC 01/10/2024 425  250 - 450 ug/dL Final    Iron 01/10/2024 51  50 - 212 ug/dL Final    Patients treated with metal-binding drugs (ie. Deferoxamine) may have depressed iron values.    UIBC 01/10/2024 374 (H)  155 - 355 ug/dL Final    Ferritin 01/10/2024 14  11 - 307 ng/mL Final     Please note:  This report has been generated by a voice recognition software system. Therefore there may be syntax, spelling, and/or grammatical errors. Please call if you have any questions.

## 2024-03-05 NOTE — PATIENT INSTRUCTIONS
[facility] Oncology and Hematology Team  [place] - [phone]    Your Team Members:  Advanced Practitioner:  Randee Rios PA-C  Oncology Nurse:   MARIO Montemayor ([phone]) M-F 8am - 4:30pm    Please answer Private and Unavailable Calls - this may be your team(s) contacting you.  If you have medical questions/concerns/issues - contact us either by (1) My Chart (2) [place]       OTC recommendation:  B-12 extra strength 3000 mcg gummy daily x 2 months then 1500 mcg daily  Bariatric fusion meal replacement shake    Iron Rich Diet   WHAT YOU NEED TO KNOW:   An iron-rich diet includes foods that are good sources of iron. People need extra iron during childhood, adolescence (teenage years), and pregnancy. Iron is a mineral that your body needs to make hemoglobin. Hemoglobin is part of your blood and helps carry oxygen from your lungs to the rest of your body. Eat iron-rich foods and vitamin C every day to prevent iron deficiency anemia. Iron deficiency anemia can lead to other health problems in adults and growth or development problems in children.  DISCHARGE INSTRUCTIONS:   Daily iron needs:   Males:      1 to 3 years old: 7 mg    4 to 8 years old: 10 mg    9 to 13 years old: 8 mg    14 to 18 years old: 11 mg    19 years and older: 8 mg    Females:      1 to 3 years old: 7 mg    4 to 8 years old: 10 mg    9 to 13 years old: 8 mg    14 to 18 years old: 15 mg    19 to 50 years: 18 mg    Over 51 years old: 8 mg    Pregnant women:  27 mg    Foods that contain iron:   Meat, fish, and poultry are good sources of iron. They contain heme iron, a form of iron that your body absorbs very well. Fruit, vegetables, eggs, and grains such as pasta, rice, and cereal also contain iron. They contain nonheme iron, a form of iron that is not absorbed as well as heme iron. You can absorb more iron from these foods by eating a food that is high in vitamin C at the same time. You can also absorb more nonheme iron by eating a  food from the meat, fish, and poultry group at the same time.    Fish and shellfish contain some mercury, a metal that can be harmful to the body. Children and unborn babies are at higher risk for harm caused by mercury. Children and pregnant women should avoid eating fish high in mercury, such as shark and swordfish. They should also eat only fish that are lower in mercury, such as salmon, canned light tuna, and catfish. Limit the amount of low-mercury fish and shellfish you eat to less than 12 ounces per week.    Iron-rich foods:   Foods that contain 2 mg or more per serving:      3 ounces of cooked beef (sari, eye of round) or cooked turkey (dark meat)    ½ cup of beans (black, kidney, or lentil, or soybeans)    ½ cup of tofu    1 medium baked potato    1 cup of cooked artichoke or cooked spinach    ¾ cup of instant oatmeal    1 cup of corn flakes    Foods that contain 1 to 2 mg per serving:      3 ounces of chicken    3 ounces of pork    3 ounces of turkey (light meat)    3 ounces of light tuna    ½ cup of seedless, packed raisins    1 slice of whole-wheat or white bread       Good sources of vitamin C:  Eat a serving of vitamin C with any iron-rich food to help your body absorb more iron. The following fruits and vegetables are good sources of vitamin C:  1 cup of fresh orange juice (124 mg) or pink grapefruit juice (83 mg)    1 cup of strawberries (106 mg)    1 cup of diced cantaloupe (68 mg)    1 cup of sweet yellow pepper (283 mg)    1 cup of fresh, boiled broccoli (116 mg) or cooked brussels sprouts (97 mg)    1 cup of kale (53 mg)    1 cup of tomato juice (45 mg)       Other guidelines to follow:   Tea and coffee can decrease the amount of iron that your body absorbs from iron-rich foods. Drink coffee and tea separately from meals that contain iron-rich foods.    Have foods and liquids high in calcium separately from iron-rich foods. Calcium prevents iron from being absorbed. Cow's milk and products made  from it, such as cheese and yogurt, are high in calcium. Children older than 1 year only need about 24 ounces of cow's milk each day. Other foods high in calcium include leafy greens, green vegetables, almonds, and canned sardines.       © Copyright [employer] 2023 Information is for End User's use only and may not be sold, redistributed or otherwise used for commercial purposes.  The above information is an  only. It is not intended as medical advice for individual conditions or treatments. Talk to your doctor, nurse or pharmacist before following any medical regimen to see if it is safe and effective for you.

## 2024-03-05 NOTE — TELEPHONE ENCOUNTER
What would be a preferred day of the week that would work best for your infusion appointment? Monday through Thursday  Do you prefer mornings or afternoons for your appointments? PM - 3pm or later, works for  in [place]  Are there any days or dates that do not work for your schedule, including any upcoming vacations? Fridays   We are going to try our best to schedule you at the infusion center closest to your home.  In the event that we are unable to what would be your next preferred infusion site or sites? [facility]

## 2024-03-05 NOTE — TELEPHONE ENCOUNTER
Spoke with patient, verbally confirmed first appt and she stated she will see the rest on MyChart.

## 2024-03-12 DIAGNOSIS — M79.7 FIBROMYALGIA, PRIMARY: ICD-10-CM

## 2024-03-12 DIAGNOSIS — F41.9 ANXIETY: ICD-10-CM

## 2024-03-12 DIAGNOSIS — I10 ESSENTIAL HYPERTENSION: ICD-10-CM

## 2024-03-12 DIAGNOSIS — F51.01 PRIMARY INSOMNIA: ICD-10-CM

## 2024-03-12 RX ORDER — DULOXETIN HYDROCHLORIDE 30 MG/1
30 CAPSULE, DELAYED RELEASE ORAL DAILY
Qty: 90 CAPSULE | Refills: 0 | Status: SHIPPED | OUTPATIENT
Start: 2024-03-12

## 2024-03-12 RX ORDER — ZOLPIDEM TARTRATE 5 MG/1
5 TABLET ORAL
Qty: 30 TABLET | Refills: 0 | Status: SHIPPED | OUTPATIENT
Start: 2024-03-12

## 2024-03-12 RX ORDER — DULOXETIN HYDROCHLORIDE 60 MG/1
60 CAPSULE, DELAYED RELEASE ORAL DAILY
Qty: 90 CAPSULE | Refills: 0 | Status: SHIPPED | OUTPATIENT
Start: 2024-03-12

## 2024-03-12 RX ORDER — BISOPROLOL FUMARATE AND HYDROCHLOROTHIAZIDE 10; 6.25 MG/1; MG/1
1 TABLET ORAL DAILY
Qty: 90 TABLET | Refills: 0 | Status: SHIPPED | OUTPATIENT
Start: 2024-03-12

## 2024-03-14 ENCOUNTER — OFFICE VISIT (OUTPATIENT)
Dept: OBGYN CLINIC | Facility: CLINIC | Age: 64
End: 2024-03-14
Payer: COMMERCIAL

## 2024-03-14 ENCOUNTER — APPOINTMENT (OUTPATIENT)
Dept: LAB | Facility: HOSPITAL | Age: 64
End: 2024-03-14
Payer: COMMERCIAL

## 2024-03-14 VITALS — BODY MASS INDEX: 51.91 KG/M2 | WEIGHT: 293 LBS | HEIGHT: 63 IN

## 2024-03-14 DIAGNOSIS — K91.2 POSTGASTRECTOMY MALABSORPTION: ICD-10-CM

## 2024-03-14 DIAGNOSIS — M65.321 TRIGGER INDEX FINGER OF RIGHT HAND: ICD-10-CM

## 2024-03-14 DIAGNOSIS — E66.01 CLASS 3 SEVERE OBESITY DUE TO EXCESS CALORIES WITH SERIOUS COMORBIDITY AND BODY MASS INDEX (BMI) OF 50.0 TO 59.9 IN ADULT (HCC): ICD-10-CM

## 2024-03-14 DIAGNOSIS — E53.8 B12 DEFICIENCY: ICD-10-CM

## 2024-03-14 DIAGNOSIS — Z90.3 POSTGASTRECTOMY MALABSORPTION: ICD-10-CM

## 2024-03-14 DIAGNOSIS — G25.81 RESTLESS LEG SYNDROME: ICD-10-CM

## 2024-03-14 DIAGNOSIS — M65.341 TRIGGER FINGER, RIGHT RING FINGER: ICD-10-CM

## 2024-03-14 DIAGNOSIS — M65.331 TRIGGER MIDDLE FINGER OF RIGHT HAND: Primary | ICD-10-CM

## 2024-03-14 DIAGNOSIS — E61.1 HYPOFERREMIA: ICD-10-CM

## 2024-03-14 LAB
BASOPHILS # BLD AUTO: 0.06 THOUSANDS/ÂΜL (ref 0–0.1)
BASOPHILS NFR BLD AUTO: 1 % (ref 0–1)
EOSINOPHIL # BLD AUTO: 0.28 THOUSAND/ÂΜL (ref 0–0.61)
EOSINOPHIL NFR BLD AUTO: 4 % (ref 0–6)
ERYTHROCYTE [DISTWIDTH] IN BLOOD BY AUTOMATED COUNT: 15.3 % (ref 11.6–15.1)
FERRITIN SERPL-MCNC: 11 NG/ML (ref 11–307)
FOLATE SERPL-MCNC: >22.3 NG/ML
HCT VFR BLD AUTO: 40.9 % (ref 34.8–46.1)
HGB BLD-MCNC: 13.1 G/DL (ref 11.5–15.4)
IMM GRANULOCYTES # BLD AUTO: 0.02 THOUSAND/UL (ref 0–0.2)
IMM GRANULOCYTES NFR BLD AUTO: 0 % (ref 0–2)
IRON SATN MFR SERPL: 9 % (ref 15–50)
IRON SERPL-MCNC: 37 UG/DL (ref 50–212)
LYMPHOCYTES # BLD AUTO: 2.43 THOUSANDS/ÂΜL (ref 0.6–4.47)
LYMPHOCYTES NFR BLD AUTO: 32 % (ref 14–44)
MCH RBC QN AUTO: 27.6 PG (ref 26.8–34.3)
MCHC RBC AUTO-ENTMCNC: 32 G/DL (ref 31.4–37.4)
MCV RBC AUTO: 86 FL (ref 82–98)
MONOCYTES # BLD AUTO: 0.53 THOUSAND/ÂΜL (ref 0.17–1.22)
MONOCYTES NFR BLD AUTO: 7 % (ref 4–12)
NEUTROPHILS # BLD AUTO: 4.36 THOUSANDS/ÂΜL (ref 1.85–7.62)
NEUTS SEG NFR BLD AUTO: 56 % (ref 43–75)
NRBC BLD AUTO-RTO: 0 /100 WBCS
PLATELET # BLD AUTO: 247 THOUSANDS/UL (ref 149–390)
PMV BLD AUTO: 10.4 FL (ref 8.9–12.7)
RBC # BLD AUTO: 4.75 MILLION/UL (ref 3.81–5.12)
TIBC SERPL-MCNC: 426 UG/DL (ref 250–450)
UIBC SERPL-MCNC: 389 UG/DL (ref 155–355)
VIT B12 SERPL-MCNC: 1281 PG/ML (ref 180–914)
WBC # BLD AUTO: 7.68 THOUSAND/UL (ref 4.31–10.16)

## 2024-03-14 PROCEDURE — 85025 COMPLETE CBC W/AUTO DIFF WBC: CPT

## 2024-03-14 PROCEDURE — 36415 COLL VENOUS BLD VENIPUNCTURE: CPT

## 2024-03-14 PROCEDURE — 83540 ASSAY OF IRON: CPT

## 2024-03-14 PROCEDURE — 99214 OFFICE O/P EST MOD 30 MIN: CPT | Performed by: SURGERY

## 2024-03-14 PROCEDURE — 82607 VITAMIN B-12: CPT

## 2024-03-14 PROCEDURE — 82746 ASSAY OF FOLIC ACID SERUM: CPT

## 2024-03-14 PROCEDURE — 82728 ASSAY OF FERRITIN: CPT

## 2024-03-14 PROCEDURE — 83550 IRON BINDING TEST: CPT

## 2024-03-14 RX ORDER — CHLORHEXIDINE GLUCONATE ORAL RINSE 1.2 MG/ML
15 SOLUTION DENTAL ONCE
OUTPATIENT
Start: 2024-03-14 | End: 2024-03-14

## 2024-03-14 NOTE — H&P
ASSESSMENT/PLAN:      63-year-old female with right index, long, and ring trigger fingers    Patient experienced little to no improvement with steroid injections.  We discussed next steps in treatment including repeat injection versus surgical release of the affected fingers.  Risk benefits to both options were reviewed, patient wishes to proceed with surgical release of the right index, long, and ring trigger fingers under local anesthesia.  Surgical procedure including risks and benefits were discussed and informed consent was signed today.  Postoperative protocol and expectations were reviewed and all questions answered.  Patient does work in the inpatient pharmacy, will likely need to take roughly 2 weeks off of work until her sutures are removed and she is reevaluated in the office.  Will plan on starting therapy prior to her first postop visit.  Follow-up 10 to 14 days following surgery for postop evaluation and suture removal  Of note, she is starting iron infusions shortly which will take place over the next 7 weeks, will plan for surgery after this. BMI was also discussed as it pertains to surgical intervention for the hand    The patient verbalized understanding of exam findings and treatment plan. We engaged in the shared decision-making process and treatment options were discussed at length with the patient. Surgical and conservative management discussed today along with risks and benefits.    Diagnoses and all orders for this visit:    Trigger middle finger of right hand  -     Case request operating room: RELEASE TRIGGER FINGER RIGHT INDEX, LONG, RING; Standing  -     Case request operating room: RELEASE TRIGGER FINGER RIGHT INDEX, LONG, RING    Trigger index finger of right hand  -     Case request operating room: RELEASE TRIGGER FINGER RIGHT INDEX, LONG, RING; Standing  -     Case request operating room: RELEASE TRIGGER FINGER RIGHT INDEX, LONG, RING    Trigger finger, right ring finger  -     Case  request operating room: RELEASE TRIGGER FINGER RIGHT INDEX, LONG, RING; Standing  -     Case request operating room: RELEASE TRIGGER FINGER RIGHT INDEX, LONG, RING    Class 3 severe obesity due to excess calories with serious comorbidity and body mass index (BMI) of 50.0 to 59.9 in adult (HCC)    Other orders  -     Nursing Communication Warmimg Interventions Implemented; Standing  -     Nursing Communication CHG bath, have staff wash entire body (neck down) per pre-op bathing protocol. Routine, evening prior to, and day of surgery.; Standing  -     Nursing Communication Swab both nares with Povidone-Iodine solution, EXCLUDE if patient has shellfish/Iodine allergy, and replace with nasal alcohol swabstick. Routine, day of surgery, on call to OR; Standing  -     chlorhexidine (PERIDEX) 0.12 % oral rinse 15 mL  -     Void on call to OR; Standing          Trigger Finger Release: The anatomy and physiology of trigger finger was discussed with the patient today in the office.  Edema and increased contact pressure within the flexor tendons at the A1 pulley can cause pain, crepitation, and limitation of function.  Treatment options include resting MP blocking splints to decrease edema, oral anti-inflammatory medications, home or formal therapy exercises, up to 2 steroid injections or surgical release.  While majority of patients do respond to conservative treatment, up to 20% may require surgical release.  The patient has elected release of the trigger finger.  The patient has elected to undergo a release of the A1 pulley (trigger finger).  A small incision will be made over the palmar aspect of the hand, the tendon sheath holding the flexor tendons will be released.  In the postoperative period, light activities are allowed immediately, driving is allowed when narcotic medication has stopped, and the incision may get wet after 2 days.  Heavy activities (lifting more than approximately 10 pounds) will be allowed after the  follow up appointment in 1-2 weeks.  While the pain and discomfort within the wrist typically improves rapidly, some residual discomfort may be present for up to 6 weeks.  The nodule that is typically palpable in the palmar aspect of the hand will not be removed, as this would necessitate removal of a portion of the flexor tendon, however the catching, clicking, and locking should resolve.  Approximate success rate is 98%.The risks and benefits of the procedure were explained to the patient, which include, but are not limited to: Bleeding, infection, recurrence, pain, scar, damage to tendons, damage to nerves, and damage to blood vessels, need for future surgery and complications related to anesthesia.  If bony work is done, risks also include malunion and nonunion.  These risks, along with alternative conservative treatment options, and postoperative protocols were voiced back and understood by the patient.  All questions were answered to the patient's satisfaction.  The patient agrees to comply with a standard postoperative protocol, and is willing to proceed.  Education was provided via written and auditory forms.  There were no barriers to learning. Written handouts regarding wound care, incision and scar care, and general preoperative information, as well as risks and benefits were provided to the patient.    Follow Up:  Return for After Surgery.      To Do Next Visit:  Re-evaluation of current issue    ____________________________________________________________________________________________________________________________________________      CHIEF COMPLAINT:  Chief Complaint   Patient presents with    Follow-up     Last injection only lasted a day or two.       SUBJECTIVE:  Silvia Antonio is a 63 y.o. year old RHD female who presents presents for follow-up evaluation of multiple right hand trigger fingers.  Patient has undergone 2 injections in the past, 1 for each finger, which have not helped her symptoms  significantly.  She continues to note painful clicking and locking of the index, long, and ring fingers intermittently as well as swelling and stiffness in the long finger.  She would like to discuss next steps today.        I have personally reviewed all the relevant PMH, PSH, SH, FH, Medications and allergies.     PAST MEDICAL HISTORY:  Past Medical History:   Diagnosis Date    Anemia     Anxiety     Arthritis     Fibromyalgia     Headache(784.0)     High blood pressure     Hypertension     Obesity     Panic attacks     Shingles        PAST SURGICAL HISTORY:  Past Surgical History:   Procedure Laterality Date    CHOLECYSTECTOMY      HERNIA REPAIR      KNEE SURGERY      HERIBERTO-EN-Y PROCEDURE      SHOULDER OPEN ROTATOR CUFF REPAIR      SPINAL FUSION      TUBAL LIGATION         FAMILY HISTORY:  Family History   Problem Relation Age of Onset    Hypertension Mother     COPD Mother     Heart attack Father         late 50s     Arthritis Father     Pulmonary embolism Father     No Known Problems Sister     Hypertension Brother     Alcohol abuse Brother     No Known Problems Maternal Grandmother     No Known Problems Paternal Grandmother     No Known Problems Maternal Aunt     No Known Problems Paternal Aunt     Breast cancer Neg Hx        SOCIAL HISTORY:  Social History     Tobacco Use    Smoking status: Former     Current packs/day: 0.00     Average packs/day: 0.5 packs/day for 37.4 years (18.7 ttl pk-yrs)     Types: Cigarettes     Start date: 1980     Quit date: 2017     Years since quittin.7    Smokeless tobacco: Never   Vaping Use    Vaping status: Never Used   Substance Use Topics    Alcohol use: Yes     Alcohol/week: 3.0 standard drinks of alcohol     Types: 3 Cans of beer per week     Comment: 3 per week    Drug use: Never     Comment: Illicit drugs:   no  - As per Alesia        MEDICATIONS:    Current Outpatient Medications:     bisoprolol-hydrochlorothiazide (ZIAC) 10-6.25 MG per tablet, Take 1 tablet  by mouth daily, Disp: 90 tablet, Rfl: 0    DULoxetine (CYMBALTA) 30 mg delayed release capsule, Take 1 capsule (30 mg total) by mouth daily, Disp: 90 capsule, Rfl: 0    DULoxetine (CYMBALTA) 60 mg delayed release capsule, Take 1 capsule (60 mg total) by mouth daily, Disp: 90 capsule, Rfl: 0    ferrous sulfate 325 (65 Fe) mg tablet, Take 325 mg by mouth daily with breakfast, Disp: , Rfl:     gabapentin (NEURONTIN) 400 mg capsule, Take 1 capsule (400 mg total) by mouth 3 (three) times a day, Disp: 270 capsule, Rfl: 0    zolpidem (AMBIEN) 5 mg tablet, Take 1 tablet (5 mg total) by mouth daily at bedtime as needed for sleep, Disp: 30 tablet, Rfl: 0    benzonatate (TESSALON) 200 MG capsule, Take 1 capsule (200 mg total) by mouth 3 (three) times a day as needed for cough, Disp: 20 capsule, Rfl: 0    senna (SENOKOT) 8.6 MG tablet, Take 1 tablet by mouth 2 (two) times a day, Disp: , Rfl:     valACYclovir (VALTREX) 1,000 mg tablet, Take 1 tablet (1,000 mg total) by mouth 3 (three) times a day for 7 days, Disp: 21 tablet, Rfl: 0    ALLERGIES:  No Known Allergies    REVIEW OF SYSTEMS:  Review of Systems   Constitutional:  Negative for chills, fatigue and fever.   HENT:  Negative for hearing loss, nosebleeds and sore throat.    Eyes:  Negative for redness and visual disturbance.   Respiratory:  Negative for cough, shortness of breath and wheezing.    Cardiovascular:  Negative for chest pain, palpitations and leg swelling.   Gastrointestinal:  Negative for abdominal pain, nausea and vomiting.   Endocrine: Negative for polydipsia and polyuria.   Genitourinary:  Negative for difficulty urinating, dysuria and hematuria.   Musculoskeletal:  Positive for arthralgias. Negative for joint swelling and myalgias.   Skin:  Negative for rash and wound.   Neurological:  Negative for speech difficulty, weakness, numbness and headaches.   Psychiatric/Behavioral:  Negative for decreased concentration and suicidal ideas. The patient is not  nervous/anxious.        VITALS:  There were no vitals filed for this visit.    LABS:  HgA1c:   Lab Results   Component Value Date    HGBA1C 5.8 (H) 01/10/2024     BMP:   Lab Results   Component Value Date    CALCIUM 9.2 09/08/2023    K 4.3 09/08/2023    CO2 28 09/08/2023     09/08/2023    BUN 17 09/08/2023    CREATININE 0.91 09/08/2023       _____________________________________________________  PHYSICAL EXAMINATION:  General: well developed and well nourished, alert, oriented times 3, and appears comfortable  Psychiatric: Normal  HEENT: Normocephalic, Atraumatic Trachea Midline, No torticollis  Pulmonary: No audible wheezing or respiratory distress   Cardiovascular: No pitting edema, 2+ radial pulse   Skin: No masses, erythema, lacerations, fluctation, ulcerations  Neurovascular: Sensation Intact to the Median, Ulnar, Radial Nerve, Motor Intact to the Median, Ulnar, Radial Nerve, and Pulses Intact  Musculoskeletal: Normal, except as noted in detailed exam and in HPI.      MUSCULOSKELETAL EXAMINATION:  right index, long, and ring fingers:  Negative palpable nodule over the A1 pulley.  Positive tenderness to palpation over A1 pulley. Negative catching. Positive clicking.        ___________________________________________________  STUDIES REVIEWED:  No studies to review         PROCEDURES PERFORMED:  Procedures  No Procedures performed today    _____________________________________________________    Marnie Ding

## 2024-03-18 ENCOUNTER — TELEPHONE (OUTPATIENT)
Dept: OBGYN CLINIC | Facility: CLINIC | Age: 64
End: 2024-03-18

## 2024-03-19 ENCOUNTER — HOSPITAL ENCOUNTER (OUTPATIENT)
Dept: INFUSION CENTER | Facility: CLINIC | Age: 64
Discharge: HOME | End: 2024-03-19
Payer: COMMERCIAL

## 2024-03-19 VITALS
OXYGEN SATURATION: 96 % | HEART RATE: 62 BPM | SYSTOLIC BLOOD PRESSURE: 151 MMHG | TEMPERATURE: 97.8 F | DIASTOLIC BLOOD PRESSURE: 83 MMHG | RESPIRATION RATE: 18 BRPM

## 2024-03-19 DIAGNOSIS — G25.81 RESTLESS LEG SYNDROME: ICD-10-CM

## 2024-03-19 DIAGNOSIS — E53.8 B12 DEFICIENCY: Primary | ICD-10-CM

## 2024-03-19 DIAGNOSIS — E61.1 HYPOFERREMIA: ICD-10-CM

## 2024-03-19 PROCEDURE — 96365 THER/PROPH/DIAG IV INF INIT: CPT

## 2024-03-19 PROCEDURE — 96372 THER/PROPH/DIAG INJ SC/IM: CPT

## 2024-03-19 RX ORDER — SODIUM CHLORIDE 9 MG/ML
20 INJECTION, SOLUTION INTRAVENOUS ONCE
Status: COMPLETED | OUTPATIENT
Start: 2024-03-19 | End: 2024-03-19

## 2024-03-19 RX ORDER — CYANOCOBALAMIN 1000 UG/ML
1000 INJECTION, SOLUTION INTRAMUSCULAR; SUBCUTANEOUS ONCE
Status: CANCELLED | OUTPATIENT
Start: 2024-03-27 | End: 2024-03-26

## 2024-03-19 RX ORDER — SODIUM CHLORIDE 9 MG/ML
20 INJECTION, SOLUTION INTRAVENOUS ONCE
Status: CANCELLED | OUTPATIENT
Start: 2024-03-27

## 2024-03-19 RX ORDER — CYANOCOBALAMIN 1000 UG/ML
1000 INJECTION, SOLUTION INTRAMUSCULAR; SUBCUTANEOUS ONCE
Status: COMPLETED | OUTPATIENT
Start: 2024-03-19 | End: 2024-03-19

## 2024-03-19 RX ADMIN — SODIUM CHLORIDE 20 ML/HR: 0.9 INJECTION, SOLUTION INTRAVENOUS at 15:36

## 2024-03-19 RX ADMIN — CYANOCOBALAMIN 1000 MCG: 1000 INJECTION, SOLUTION INTRAMUSCULAR at 15:37

## 2024-03-19 RX ADMIN — IRON SUCROSE 200 MG: 20 INJECTION, SOLUTION INTRAVENOUS at 15:37

## 2024-03-19 NOTE — PROGRESS NOTES
Patient tolerated treatment without incident. Peripheral IV removed. Next appointment for venofer confirmed for 3/27/2024 at 1530. AVS offered and declined.

## 2024-03-19 NOTE — PROGRESS NOTES
Pt presents for B12 inj & venofer infusion. B12 IM inj given in left deltoid, will continue to monitor

## 2024-03-27 ENCOUNTER — HOSPITAL ENCOUNTER (OUTPATIENT)
Dept: INFUSION CENTER | Facility: CLINIC | Age: 64
Discharge: HOME | End: 2024-03-27
Payer: COMMERCIAL

## 2024-03-27 VITALS
SYSTOLIC BLOOD PRESSURE: 137 MMHG | OXYGEN SATURATION: 96 % | RESPIRATION RATE: 18 BRPM | HEART RATE: 64 BPM | DIASTOLIC BLOOD PRESSURE: 84 MMHG | TEMPERATURE: 97.9 F

## 2024-03-27 DIAGNOSIS — E53.8 B12 DEFICIENCY: Primary | ICD-10-CM

## 2024-03-27 DIAGNOSIS — E61.1 HYPOFERREMIA: ICD-10-CM

## 2024-03-27 DIAGNOSIS — G25.81 RESTLESS LEG SYNDROME: ICD-10-CM

## 2024-03-27 PROCEDURE — 96365 THER/PROPH/DIAG IV INF INIT: CPT

## 2024-03-27 PROCEDURE — 96372 THER/PROPH/DIAG INJ SC/IM: CPT

## 2024-03-27 RX ORDER — SODIUM CHLORIDE 9 MG/ML
20 INJECTION, SOLUTION INTRAVENOUS ONCE
Status: COMPLETED | OUTPATIENT
Start: 2024-03-27 | End: 2024-03-27

## 2024-03-27 RX ORDER — SODIUM CHLORIDE 9 MG/ML
20 INJECTION, SOLUTION INTRAVENOUS ONCE
OUTPATIENT
Start: 2024-04-02

## 2024-03-27 RX ORDER — CYANOCOBALAMIN 1000 UG/ML
1000 INJECTION, SOLUTION INTRAMUSCULAR; SUBCUTANEOUS ONCE
Status: COMPLETED | OUTPATIENT
Start: 2024-03-27 | End: 2024-03-27

## 2024-03-27 RX ORDER — CYANOCOBALAMIN 1000 UG/ML
1000 INJECTION, SOLUTION INTRAMUSCULAR; SUBCUTANEOUS ONCE
OUTPATIENT
Start: 2024-04-02 | End: 2024-04-02

## 2024-03-27 RX ADMIN — IRON SUCROSE 200 MG: 20 INJECTION, SOLUTION INTRAVENOUS at 15:52

## 2024-03-27 RX ADMIN — SODIUM CHLORIDE 20 ML/HR: 0.9 INJECTION, SOLUTION INTRAVENOUS at 15:52

## 2024-03-27 RX ADMIN — CYANOCOBALAMIN 1000 MCG: 1000 INJECTION, SOLUTION INTRAMUSCULAR at 15:52

## 2024-03-27 NOTE — PROGRESS NOTES
Patient to infusion for venofer and B12.  She tolerated treatment today.  B12 given in Left Arm.  Next appointment 4/3 4pm, she declined AVS

## 2024-04-01 ENCOUNTER — TELEPHONE (OUTPATIENT)
Dept: OBGYN CLINIC | Facility: CLINIC | Age: 64
End: 2024-04-01

## 2024-04-03 ENCOUNTER — HOSPITAL ENCOUNTER (OUTPATIENT)
Dept: INFUSION CENTER | Facility: CLINIC | Age: 64
Discharge: HOME | End: 2024-04-03
Payer: COMMERCIAL

## 2024-04-03 VITALS
SYSTOLIC BLOOD PRESSURE: 150 MMHG | DIASTOLIC BLOOD PRESSURE: 78 MMHG | RESPIRATION RATE: 18 BRPM | OXYGEN SATURATION: 99 % | HEART RATE: 61 BPM | TEMPERATURE: 97.3 F

## 2024-04-03 DIAGNOSIS — E61.1 HYPOFERREMIA: ICD-10-CM

## 2024-04-03 DIAGNOSIS — G25.81 RESTLESS LEG SYNDROME: ICD-10-CM

## 2024-04-03 DIAGNOSIS — E53.8 B12 DEFICIENCY: Primary | ICD-10-CM

## 2024-04-03 PROCEDURE — 96365 THER/PROPH/DIAG IV INF INIT: CPT

## 2024-04-03 PROCEDURE — 96372 THER/PROPH/DIAG INJ SC/IM: CPT

## 2024-04-03 RX ORDER — CYANOCOBALAMIN 1000 UG/ML
1000 INJECTION, SOLUTION INTRAMUSCULAR; SUBCUTANEOUS ONCE
Status: COMPLETED | OUTPATIENT
Start: 2024-04-03 | End: 2024-04-03

## 2024-04-03 RX ORDER — CYANOCOBALAMIN 1000 UG/ML
1000 INJECTION, SOLUTION INTRAMUSCULAR; SUBCUTANEOUS ONCE
Status: CANCELLED | OUTPATIENT
Start: 2024-04-10 | End: 2024-04-10

## 2024-04-03 RX ORDER — SODIUM CHLORIDE 9 MG/ML
20 INJECTION, SOLUTION INTRAVENOUS ONCE
Status: CANCELLED | OUTPATIENT
Start: 2024-04-10

## 2024-04-03 RX ORDER — SODIUM CHLORIDE 9 MG/ML
20 INJECTION, SOLUTION INTRAVENOUS ONCE
Status: COMPLETED | OUTPATIENT
Start: 2024-04-03 | End: 2024-04-03

## 2024-04-03 RX ADMIN — IRON SUCROSE 200 MG: 20 INJECTION, SOLUTION INTRAVENOUS at 16:16

## 2024-04-03 RX ADMIN — SODIUM CHLORIDE 20 ML/HR: 0.9 INJECTION, SOLUTION INTRAVENOUS at 16:14

## 2024-04-03 RX ADMIN — CYANOCOBALAMIN 1000 MCG: 1000 INJECTION, SOLUTION INTRAMUSCULAR at 16:18

## 2024-04-03 NOTE — PROGRESS NOTES
Pt tolerated venofer infusion, offers no complaints, IV removed, next appointment scheduled for 4/10 at 330pm, declined AVS

## 2024-04-10 ENCOUNTER — HOSPITAL ENCOUNTER (OUTPATIENT)
Dept: INFUSION CENTER | Facility: CLINIC | Age: 64
Discharge: HOME | End: 2024-04-10
Payer: COMMERCIAL

## 2024-04-10 VITALS
HEART RATE: 59 BPM | OXYGEN SATURATION: 96 % | DIASTOLIC BLOOD PRESSURE: 79 MMHG | RESPIRATION RATE: 18 BRPM | TEMPERATURE: 97.3 F | SYSTOLIC BLOOD PRESSURE: 134 MMHG

## 2024-04-10 DIAGNOSIS — E61.1 HYPOFERREMIA: ICD-10-CM

## 2024-04-10 DIAGNOSIS — G25.81 RESTLESS LEG SYNDROME: ICD-10-CM

## 2024-04-10 DIAGNOSIS — E53.8 B12 DEFICIENCY: Primary | ICD-10-CM

## 2024-04-10 PROCEDURE — 96372 THER/PROPH/DIAG INJ SC/IM: CPT

## 2024-04-10 PROCEDURE — 96365 THER/PROPH/DIAG IV INF INIT: CPT

## 2024-04-10 RX ORDER — CYANOCOBALAMIN 1000 UG/ML
1000 INJECTION, SOLUTION INTRAMUSCULAR; SUBCUTANEOUS ONCE
Status: COMPLETED | OUTPATIENT
Start: 2024-04-10 | End: 2024-04-10

## 2024-04-10 RX ORDER — CYANOCOBALAMIN 1000 UG/ML
1000 INJECTION, SOLUTION INTRAMUSCULAR; SUBCUTANEOUS ONCE
Status: CANCELLED | OUTPATIENT
Start: 2024-04-17 | End: 2024-04-17

## 2024-04-10 RX ORDER — SODIUM CHLORIDE 9 MG/ML
20 INJECTION, SOLUTION INTRAVENOUS ONCE
Status: COMPLETED | OUTPATIENT
Start: 2024-04-10 | End: 2024-04-10

## 2024-04-10 RX ORDER — SODIUM CHLORIDE 9 MG/ML
20 INJECTION, SOLUTION INTRAVENOUS ONCE
Status: CANCELLED | OUTPATIENT
Start: 2024-04-17

## 2024-04-10 RX ADMIN — IRON SUCROSE 200 MG: 20 INJECTION, SOLUTION INTRAVENOUS at 15:41

## 2024-04-10 RX ADMIN — CYANOCOBALAMIN 1000 MCG: 1000 INJECTION, SOLUTION INTRAMUSCULAR at 15:43

## 2024-04-10 RX ADMIN — SODIUM CHLORIDE 20 ML/HR: 0.9 INJECTION, SOLUTION INTRAVENOUS at 15:40

## 2024-04-10 NOTE — PROGRESS NOTES
Pt tolerated venofer infusiona nd B-12 shot in the right arm,  Offers no complaints, pts next appointment is on 4/17m at 4pm, declined AVS

## 2024-04-10 NOTE — PLAN OF CARE
Problem: Potential for Falls  Goal: Patient will remain free of falls  Description: INTERVENTIONS:  - Educate patient/family on patient safety including physical limitations  - Instruct patient to call for assistance with activity   - Consult OT/PT to assist with strengthening/mobility   - Keep Call bell within reach  - Keep bed low and locked with side rails adjusted as appropriate  - Keep care items and personal belongings within reach  - Initiate and maintain comfort rounds  - Make Fall Risk Sign visible to staff  -- Consider moving patient to room near nurses station  Outcome: Completed     Problem: Knowledge Deficit  Goal: Patient/family/caregiver demonstrates understanding of disease process, treatment plan, medications, and discharge instructions  Description: Complete learning assessment and assess knowledge base.  Interventions:  - Provide teaching at level of understanding  - Provide teaching via preferred learning methods  Outcome: Completed

## 2024-04-10 NOTE — PROGRESS NOTES
Pt to clinic for venofer infusion and B12 injection. Pt offers no complaints at this time. Call bell in reach

## 2024-04-17 ENCOUNTER — HOSPITAL ENCOUNTER (OUTPATIENT)
Dept: INFUSION CENTER | Facility: CLINIC | Age: 64
Discharge: HOME | End: 2024-04-17
Payer: COMMERCIAL

## 2024-04-17 VITALS
RESPIRATION RATE: 18 BRPM | OXYGEN SATURATION: 98 % | DIASTOLIC BLOOD PRESSURE: 74 MMHG | SYSTOLIC BLOOD PRESSURE: 134 MMHG | TEMPERATURE: 98 F | HEART RATE: 59 BPM

## 2024-04-17 DIAGNOSIS — E53.8 B12 DEFICIENCY: Primary | ICD-10-CM

## 2024-04-17 DIAGNOSIS — E61.1 HYPOFERREMIA: ICD-10-CM

## 2024-04-17 DIAGNOSIS — G25.81 RESTLESS LEG SYNDROME: ICD-10-CM

## 2024-04-17 PROCEDURE — 96365 THER/PROPH/DIAG IV INF INIT: CPT

## 2024-04-17 PROCEDURE — 96372 THER/PROPH/DIAG INJ SC/IM: CPT

## 2024-04-17 RX ORDER — CYANOCOBALAMIN 1000 UG/ML
1000 INJECTION, SOLUTION INTRAMUSCULAR; SUBCUTANEOUS ONCE
Status: COMPLETED | OUTPATIENT
Start: 2024-04-17 | End: 2024-04-17

## 2024-04-17 RX ORDER — CYANOCOBALAMIN 1000 UG/ML
1000 INJECTION, SOLUTION INTRAMUSCULAR; SUBCUTANEOUS ONCE
Status: CANCELLED | OUTPATIENT
Start: 2024-04-24 | End: 2024-04-24

## 2024-04-17 RX ORDER — SODIUM CHLORIDE 9 MG/ML
20 INJECTION, SOLUTION INTRAVENOUS ONCE
Status: CANCELLED | OUTPATIENT
Start: 2024-04-24

## 2024-04-17 RX ORDER — SODIUM CHLORIDE 9 MG/ML
20 INJECTION, SOLUTION INTRAVENOUS ONCE
Status: COMPLETED | OUTPATIENT
Start: 2024-04-17 | End: 2024-04-17

## 2024-04-17 RX ADMIN — IRON SUCROSE 200 MG: 20 INJECTION, SOLUTION INTRAVENOUS at 16:05

## 2024-04-17 RX ADMIN — SODIUM CHLORIDE 20 ML/HR: 9 INJECTION, SOLUTION INTRAVENOUS at 16:05

## 2024-04-17 RX ADMIN — CYANOCOBALAMIN 1000 MCG: 1000 INJECTION, SOLUTION INTRAMUSCULAR at 16:14

## 2024-04-17 NOTE — PROGRESS NOTES
Pt here for venofer infusion. Pt offered no complaints today. Vitals stable. Pt resting comfortably in chair with call bell in place.   B12 injection x1 tolerated well in left arm

## 2024-04-17 NOTE — PROGRESS NOTES
Silvia Antonio  tolerated treatment well with no complications.      Silvia Antoino is aware of future appt on 4/24 at 3p.     AVS printed and given to Silvia Antonio:   No (Declined by Silvia Antonio)

## 2024-04-21 DIAGNOSIS — F51.01 PRIMARY INSOMNIA: ICD-10-CM

## 2024-04-21 DIAGNOSIS — M79.7 FIBROMYALGIA, PRIMARY: ICD-10-CM

## 2024-04-22 RX ORDER — ZOLPIDEM TARTRATE 5 MG/1
5 TABLET ORAL
Qty: 30 TABLET | Refills: 0 | Status: SHIPPED | OUTPATIENT
Start: 2024-04-22

## 2024-04-22 RX ORDER — GABAPENTIN 400 MG/1
400 CAPSULE ORAL 3 TIMES DAILY
Qty: 270 CAPSULE | Refills: 0 | Status: SHIPPED | OUTPATIENT
Start: 2024-04-22

## 2024-04-24 ENCOUNTER — HOSPITAL ENCOUNTER (OUTPATIENT)
Dept: INFUSION CENTER | Facility: CLINIC | Age: 64
Discharge: HOME | End: 2024-04-24
Payer: COMMERCIAL

## 2024-04-24 VITALS
SYSTOLIC BLOOD PRESSURE: 138 MMHG | RESPIRATION RATE: 18 BRPM | OXYGEN SATURATION: 98 % | HEART RATE: 61 BPM | DIASTOLIC BLOOD PRESSURE: 79 MMHG | TEMPERATURE: 97.6 F

## 2024-04-24 DIAGNOSIS — G25.81 RESTLESS LEG SYNDROME: ICD-10-CM

## 2024-04-24 DIAGNOSIS — E53.8 B12 DEFICIENCY: Primary | ICD-10-CM

## 2024-04-24 DIAGNOSIS — E61.1 HYPOFERREMIA: ICD-10-CM

## 2024-04-24 PROCEDURE — 96365 THER/PROPH/DIAG IV INF INIT: CPT

## 2024-04-24 PROCEDURE — 96372 THER/PROPH/DIAG INJ SC/IM: CPT

## 2024-04-24 RX ORDER — SODIUM CHLORIDE 9 MG/ML
20 INJECTION, SOLUTION INTRAVENOUS ONCE
Status: COMPLETED | OUTPATIENT
Start: 2024-04-24 | End: 2024-04-24

## 2024-04-24 RX ORDER — CYANOCOBALAMIN 1000 UG/ML
1000 INJECTION, SOLUTION INTRAMUSCULAR; SUBCUTANEOUS ONCE
Status: CANCELLED | OUTPATIENT
Start: 2024-05-01 | End: 2024-05-01

## 2024-04-24 RX ORDER — SODIUM CHLORIDE 9 MG/ML
20 INJECTION, SOLUTION INTRAVENOUS ONCE
Status: CANCELLED | OUTPATIENT
Start: 2024-05-01

## 2024-04-24 RX ORDER — CYANOCOBALAMIN 1000 UG/ML
1000 INJECTION, SOLUTION INTRAMUSCULAR; SUBCUTANEOUS ONCE
Status: COMPLETED | OUTPATIENT
Start: 2024-04-24 | End: 2024-04-24

## 2024-04-24 RX ADMIN — SODIUM CHLORIDE 20 ML/HR: 0.9 INJECTION, SOLUTION INTRAVENOUS at 15:16

## 2024-04-24 RX ADMIN — IRON SUCROSE 200 MG: 20 INJECTION, SOLUTION INTRAVENOUS at 15:20

## 2024-04-24 RX ADMIN — CYANOCOBALAMIN 1000 MCG: 1000 INJECTION, SOLUTION INTRAMUSCULAR at 15:21

## 2024-04-24 NOTE — PROGRESS NOTES
Patient arrives for Venofer infusion and B12 injection. PIV placed L hand by MARIO BASSETT. B12 injection administered L arm, bandaid applied. Patient resting comfortably on recliner, call bell within reach.

## 2024-04-24 NOTE — PROGRESS NOTES
Patient tolerated infusion without issue. PIV removed L hand, bleeding controlled, coban applied. Confirmed next appointment 5/1 at 1600, declined AVS, ambulatory from department.

## 2024-05-01 ENCOUNTER — HOSPITAL ENCOUNTER (OUTPATIENT)
Dept: INFUSION CENTER | Facility: CLINIC | Age: 64
Discharge: HOME | End: 2024-05-01
Payer: COMMERCIAL

## 2024-05-01 VITALS
OXYGEN SATURATION: 96 % | DIASTOLIC BLOOD PRESSURE: 77 MMHG | TEMPERATURE: 98 F | HEART RATE: 60 BPM | RESPIRATION RATE: 18 BRPM | SYSTOLIC BLOOD PRESSURE: 135 MMHG

## 2024-05-01 DIAGNOSIS — E53.8 B12 DEFICIENCY: Primary | ICD-10-CM

## 2024-05-01 DIAGNOSIS — G25.81 RESTLESS LEG SYNDROME: ICD-10-CM

## 2024-05-01 DIAGNOSIS — E61.1 HYPOFERREMIA: ICD-10-CM

## 2024-05-01 PROCEDURE — 96365 THER/PROPH/DIAG IV INF INIT: CPT

## 2024-05-01 PROCEDURE — 96372 THER/PROPH/DIAG INJ SC/IM: CPT

## 2024-05-01 RX ORDER — SODIUM CHLORIDE 9 MG/ML
20 INJECTION, SOLUTION INTRAVENOUS ONCE
Status: COMPLETED | OUTPATIENT
Start: 2024-05-01 | End: 2024-05-01

## 2024-05-01 RX ORDER — SODIUM CHLORIDE 9 MG/ML
20 INJECTION, SOLUTION INTRAVENOUS ONCE
Status: CANCELLED | OUTPATIENT
Start: 2024-05-01

## 2024-05-01 RX ORDER — CYANOCOBALAMIN 1000 UG/ML
1000 INJECTION, SOLUTION INTRAMUSCULAR; SUBCUTANEOUS ONCE
Status: COMPLETED | OUTPATIENT
Start: 2024-05-01 | End: 2024-05-01

## 2024-05-01 RX ORDER — CYANOCOBALAMIN 1000 UG/ML
1000 INJECTION, SOLUTION INTRAMUSCULAR; SUBCUTANEOUS ONCE
Status: CANCELLED | OUTPATIENT
Start: 2024-05-01 | End: 2024-05-01

## 2024-05-01 RX ADMIN — CYANOCOBALAMIN 1000 MCG: 1000 INJECTION, SOLUTION INTRAMUSCULAR at 16:12

## 2024-05-01 RX ADMIN — SODIUM CHLORIDE 20 ML/HR: 0.9 INJECTION, SOLUTION INTRAVENOUS at 16:08

## 2024-05-01 RX ADMIN — IRON SUCROSE 200 MG: 20 INJECTION, SOLUTION INTRAVENOUS at 16:11

## 2024-05-01 NOTE — PROGRESS NOTES
Patient tolerated treatment without incident. Peripheral IV removed. No further appointments scheduled pending provider follow up. AVS offered and declined.

## 2024-05-17 ENCOUNTER — APPOINTMENT (OUTPATIENT)
Dept: LAB | Facility: HOSPITAL | Age: 64
End: 2024-05-17
Payer: COMMERCIAL

## 2024-05-17 DIAGNOSIS — Z90.3 POSTGASTRECTOMY MALABSORPTION: ICD-10-CM

## 2024-05-17 DIAGNOSIS — K91.2 POSTGASTRECTOMY MALABSORPTION: ICD-10-CM

## 2024-05-17 DIAGNOSIS — E53.8 B12 DEFICIENCY: ICD-10-CM

## 2024-05-17 DIAGNOSIS — E61.1 HYPOFERREMIA: ICD-10-CM

## 2024-05-17 DIAGNOSIS — G25.81 RESTLESS LEG SYNDROME: ICD-10-CM

## 2024-05-17 LAB
BASOPHILS # BLD AUTO: 0.04 THOUSANDS/ÂΜL (ref 0–0.1)
BASOPHILS NFR BLD AUTO: 1 % (ref 0–1)
EOSINOPHIL # BLD AUTO: 0.32 THOUSAND/ÂΜL (ref 0–0.61)
EOSINOPHIL NFR BLD AUTO: 6 % (ref 0–6)
ERYTHROCYTE [DISTWIDTH] IN BLOOD BY AUTOMATED COUNT: 15.2 % (ref 11.6–15.1)
FERRITIN SERPL-MCNC: 229 NG/ML (ref 11–307)
FOLATE SERPL-MCNC: 11.1 NG/ML
HCT VFR BLD AUTO: 43.7 % (ref 34.8–46.1)
HGB BLD-MCNC: 14.3 G/DL (ref 11.5–15.4)
IMM GRANULOCYTES # BLD AUTO: 0.02 THOUSAND/UL (ref 0–0.2)
IMM GRANULOCYTES NFR BLD AUTO: 0 % (ref 0–2)
IRON SATN MFR SERPL: 27 % (ref 15–50)
IRON SERPL-MCNC: 81 UG/DL (ref 50–212)
LYMPHOCYTES # BLD AUTO: 1.41 THOUSANDS/ÂΜL (ref 0.6–4.47)
LYMPHOCYTES NFR BLD AUTO: 25 % (ref 14–44)
MCH RBC QN AUTO: 29.2 PG (ref 26.8–34.3)
MCHC RBC AUTO-ENTMCNC: 32.7 G/DL (ref 31.4–37.4)
MCV RBC AUTO: 89 FL (ref 82–98)
MONOCYTES # BLD AUTO: 0.49 THOUSAND/ÂΜL (ref 0.17–1.22)
MONOCYTES NFR BLD AUTO: 9 % (ref 4–12)
NEUTROPHILS # BLD AUTO: 3.39 THOUSANDS/ÂΜL (ref 1.85–7.62)
NEUTS SEG NFR BLD AUTO: 59 % (ref 43–75)
NRBC BLD AUTO-RTO: 0 /100 WBCS
PLATELET # BLD AUTO: 193 THOUSANDS/UL (ref 149–390)
PMV BLD AUTO: 11.2 FL (ref 8.9–12.7)
RBC # BLD AUTO: 4.89 MILLION/UL (ref 3.81–5.12)
TIBC SERPL-MCNC: 304 UG/DL (ref 250–450)
UIBC SERPL-MCNC: 223 UG/DL (ref 155–355)
VIT B12 SERPL-MCNC: 1342 PG/ML (ref 180–914)
WBC # BLD AUTO: 5.67 THOUSAND/UL (ref 4.31–10.16)

## 2024-05-17 PROCEDURE — 82607 VITAMIN B-12: CPT

## 2024-05-17 PROCEDURE — 82728 ASSAY OF FERRITIN: CPT

## 2024-05-17 PROCEDURE — 83540 ASSAY OF IRON: CPT

## 2024-05-17 PROCEDURE — 85025 COMPLETE CBC W/AUTO DIFF WBC: CPT

## 2024-05-17 PROCEDURE — 36415 COLL VENOUS BLD VENIPUNCTURE: CPT

## 2024-05-17 PROCEDURE — 83550 IRON BINDING TEST: CPT

## 2024-05-17 PROCEDURE — 82746 ASSAY OF FOLIC ACID SERUM: CPT

## 2024-05-24 DIAGNOSIS — F51.01 PRIMARY INSOMNIA: ICD-10-CM

## 2024-05-28 RX ORDER — ZOLPIDEM TARTRATE 5 MG/1
5 TABLET ORAL
Qty: 30 TABLET | Refills: 0 | Status: SHIPPED | OUTPATIENT
Start: 2024-05-28

## 2024-05-28 NOTE — TELEPHONE ENCOUNTER
LM for patient to schedule annual (after 06/30/24)  she is having upcoming surgery so not sure if we can refill until she is able to schedule

## 2024-06-04 ENCOUNTER — TELEPHONE (OUTPATIENT)
Age: 64
End: 2024-06-04

## 2024-06-04 ENCOUNTER — TELEPHONE (OUTPATIENT)
Dept: HEMATOLOGY ONCOLOGY | Facility: CLINIC | Age: 64
End: 2024-06-04

## 2024-06-04 NOTE — DISCHARGE INSTR - AVS FIRST PAGE
Post Operative Instructions    You have had surgery on your arm today, please read and follow the information below:  Elevate your hand above your elbow during the next 24-48 hours to help with swelling.  Place your hand and arm over your head with motion at your shoulder three times a day.  Do not apply any cream/ointment/oil to your incisions including antibiotics.  Do not soak your hands in standing water (dishwater, tubs, Jacuzzi's, pools, etc.) until given permission (typically 2-3 weeks after injury)    Call the office if you notice any:  Increased numbness or tingling of your hand or fingers that is not relieved with elevation.  Increasing pain that is not controlled with medication.  Difficulty chewing, breathing, swallowing.  Chest pains or shortness of breath.  Fever over 101.4 degrees.    Bandage: Please keep bandages clean and dry. Remove bandage after 5 days. Once bandages are removed you may wash hands with soap and water. Short showers are okay as well, but please avoid soaking the hand as described above (ie no pools, baths, dirty dish water, hot tubs, ocean/lake water, etc). Sutures will be removed in the office at your first follow up visit, please do not remove them yourself.    Please do NOT put any topical agents on the surgical wound including neosporin, peroxide, tea tree oil, vitamin E, etc. as these can delay wound healing.    Motion: Move fingers into a fist 5 times a day, DO NOT move any splinted fingers.    Weight bearing status: Avoid heavy lifting (>5 pounds) with the extremity that was operated on until follow up appointment. Normal activities of daily living are OK.    Ice: Ice for 10 minutes every hour as needed for swelling x 24 hours.    Sling: No sling necessary.    Pain medication:   Naproxen 220 mg two times a day (this is over the counter!)  *do not take this medication if you were told by your doctor that you cannot take anti-inflammatories or NSAIDS  Tylenol Extended Release  650 mg every 8 hours (this is over the counter!)   Norco/Hydrocodone one tab every 6 hours ONLY AS NEEDED for severe pain        Follow-up Appointment: 10-14 days with Dr Clay        Please call the office if you have any questions or concerns regarding your post-operative care.

## 2024-06-04 NOTE — TELEPHONE ENCOUNTER
Patient Call    Who are you speaking with? Patient    If it is not the patient, are they listed on an active communication consent form? N/A   What is the reason for this call? Patient states she got a call to move her appointment to 6//13/24 @ 830, no slots were open and no notes. Would like a call to confirm and be sure its still virtual    Does this require a call back? Yes   If a call back is required, please list best call back number [phone]   If a call back is required, advise that a message will be forwarded to their care team and someone will return their call as soon as possible.   Did you relay this information to the patient? Yes

## 2024-06-04 NOTE — TELEPHONE ENCOUNTER
Caller: Patient    Doctor: Obed    Reason for call: Calling to check on the status of her FMLA paperwork. Advised it is still pending per log. She stated [employer] advised her it is due by 6/8    Call back#: [phone]

## 2024-06-05 NOTE — TELEPHONE ENCOUNTER
Advised patient that her virtual visit has not been rescheduled to 6/13/24 @ 8:30 AM because by the time she returned th call, the time slot was no longer available.    Patient may keep her virtual visit scheduled for tomorrow 6/6/2024 but Randee will contact her during her lunch break between 12 noon and 12:30 PM to offset the double booking.    Patient confirmed that she will be available at that time tomorrow.

## 2024-06-06 ENCOUNTER — TELEPHONE (OUTPATIENT)
Dept: HEMATOLOGY ONCOLOGY | Facility: CLINIC | Age: 64
End: 2024-06-06

## 2024-06-06 ENCOUNTER — TELEMEDICINE (OUTPATIENT)
Dept: HEMATOLOGY ONCOLOGY | Facility: CLINIC | Age: 64
End: 2024-06-06
Payer: COMMERCIAL

## 2024-06-06 DIAGNOSIS — Z90.3 POSTGASTRECTOMY MALABSORPTION: ICD-10-CM

## 2024-06-06 DIAGNOSIS — G25.81 RESTLESS LEG SYNDROME: Primary | ICD-10-CM

## 2024-06-06 DIAGNOSIS — K91.2 POSTGASTRECTOMY MALABSORPTION: ICD-10-CM

## 2024-06-06 DIAGNOSIS — E53.8 B12 DEFICIENCY: ICD-10-CM

## 2024-06-06 DIAGNOSIS — E61.1 HYPOFERREMIA: ICD-10-CM

## 2024-06-06 PROCEDURE — 99214 OFFICE O/P EST MOD 30 MIN: CPT | Performed by: PHYSICIAN ASSISTANT

## 2024-06-06 NOTE — TELEPHONE ENCOUNTER
Called patient to get her checked out from virtual visit. Provider wanted to see her 9 months so we got her scheduled for the virtual follow-up. Patient verbally agreed.    Thank you and have a great day

## 2024-06-06 NOTE — PROGRESS NOTES
[address]  [facility] HEMATOLOGY ONCOLOGY SPECIALISTS [place]  [address] [id number]  VIRTUAL Hematology Ambulatory Follow-Up  Silvia Antonio, 1960, [id number]  6/6/2024    Assessment/Plan:   1. Restless leg syndrome; 2. Postgastrectomy malabsorption; 3. Hypoferremia; 4. B12 deficiency  This is a 63-year-old female with past medical history of bariatric surgery in the late 90s.  Patient has required IV iron in the past, and underwent repeat infusion in spring 2024.  Patient had no side effects and felt significantly improved after IV administration.  Patient's energy level is much better.  Patient will be following up with bariatric specialty in approximately 2 months.  Furthermore patient is having trigger finger release completed shortly.    We discussed following up in approximately 9 months since the patient is no longer menstruating.  Patient will be seeing bariatric specialty team and eventually will be discharged them for further Venofer management.  Will verify the patient's iron studies are good in 9 months and then after that we will discharge as long as patient is continuing to follow-up with bariatric clinic.    I explained the above to the patient.  She voiced understanding and agreement.    - CBC and differential; Future  - Iron Panel (Includes Ferritin, Iron Sat%, Iron, and TIBC); Future  - Vitamin B12; Future  - Folate; Future    Patient knows to call the Hematology/Oncology office with any questions and concerns regarding the above.    Barrier(s) to care: None.  The patient is able to self care.    Randee Rios PA-C  Medical Oncology/Hematology  [facility]  _____________________________________________________________________    The patient was identified by name and date of birth.  Silvia Antonio  was informed that this is a telemedicine visit and that the visit is being conducted through the Epic Embedded platform. She agrees to proceed..   My office door was closed. No one else was in the room.  He acknowledged consent and understanding of privacy and security of the video platform. The patient has agreed to participate and understands they can discontinue the visit at any time.    Patient's location was verified, [place].   I hold a valid medical license in the state of [place].     Subjective      Chief Complaint   Patient presents with    Follow-up     History of present illness:   This is a 63-year-old female with past medical history of fatigue, neuropathy chronic pain syndrome, prior back surgery fibromyalgia, restless leg syndrome, hypertension, iron deficiency anemia, bariatric surgery NOS completed in the late 1990s, who presented to the hematology clinic in March 2024.    Previously on oral iron supplements which cause constipation requiring stool softeners.   Patient also previously received IV iron in 2012.  No side effects.  Patient last menstrual period was in 2014 and has not had any vaginal bleeding since.    Cologuard 8/31/22 - negative. (Previously refused: Screenings with colonoscopy)     10/13/22- HG 11.3, HCT 36.1, MCV 78, RDW 17.1,Platelets 274   Iron sat 10, TIBC 427    9/8/23- Hg- 11.4, HCT 38.1, MCV 80 RDW 17, Platlets 278   Iron SAT 11, TIBC 427    1/10/24: Hg 13.1, HCT 41.4, MCV 84, RDW 17.6, Platlets 254  Iron SAT 12, TIBC 425, and Ferritin -14    3/19/24-5/1/2024: Venofer 200 mg IV weekly x 7    5/17/2024: WBC 5.67, hemoglobin 14.3, MCV 89, platelet 193   Ferritin = 229, iron saturation 27%, B12 1341, folate 11.1    Interval history  06/06/24: Patient in general is very happy with supplementation.  Patient is feeling well more energy at least 50% improvement.    Review of Systems   Constitutional:  Positive for fatigue. Negative for appetite change, fever and unexpected weight change.   HENT:  Negative for nosebleeds.    Respiratory:  Negative for cough, choking and shortness of breath.         Negative hemoptysis.   Cardiovascular:   Negative for chest pain, palpitations and leg swelling.   Gastrointestinal: Negative.  Negative for abdominal distention, abdominal pain, anal bleeding, blood in stool, constipation, diarrhea, nausea and vomiting.   Endocrine: Negative.  Negative for cold intolerance.   Genitourinary: Negative.  Negative for hematuria, menstrual problem, vaginal bleeding, vaginal discharge and vaginal pain.   Musculoskeletal: Negative.  Negative for arthralgias, myalgias, neck pain and neck stiffness.   Skin: Negative.  Negative for color change, pallor and rash.   Allergic/Immunologic: Negative.  Negative for immunocompromised state.   Neurological: Negative.  Negative for weakness and headaches.   Hematological:  Negative for adenopathy. Does not bruise/bleed easily.   All other systems reviewed and are negative.      Past Medical History:   Diagnosis Date    Anemia     Anxiety     Arthritis     Fibromyalgia     Headache(784.0)     High blood pressure     Hypertension     Obesity     Panic attacks     Shingles      Past Surgical History:   Procedure Laterality Date    CHOLECYSTECTOMY      HERNIA REPAIR      KNEE SURGERY      HERIBERTO-EN-Y PROCEDURE      SHOULDER OPEN ROTATOR CUFF REPAIR      SPINAL FUSION      TUBAL LIGATION       Family History   Problem Relation Age of Onset    Hypertension Mother     COPD Mother     Heart attack Father         late 50s     Arthritis Father     Pulmonary embolism Father     No Known Problems Sister     Hypertension Brother     Alcohol abuse Brother     No Known Problems Maternal Grandmother     No Known Problems Paternal Grandmother     No Known Problems Maternal Aunt     No Known Problems Paternal Aunt     Breast cancer Neg Hx      Social History     Socioeconomic History    Marital status: /Civil Union     Spouse name: Not on file    Number of children: Not on file    Years of education: 12    Highest education level: Not on file   Occupational History    Not on file   Tobacco Use     Smoking status: Former     Current packs/day: 0.00     Average packs/day: 0.5 packs/day for 37.4 years (18.7 ttl pk-yrs)     Types: Cigarettes     Start date: 1980     Quit date: 2017     Years since quittin.0    Smokeless tobacco: Never   Vaping Use    Vaping status: Never Used   Substance and Sexual Activity    Alcohol use: Yes     Alcohol/week: 3.0 standard drinks of alcohol     Types: 3 Cans of beer per week     Comment: 3 per week    Drug use: Never     Comment: Illicit drugs:   no  - As per [place]     Sexual activity: Yes     Partners: Male     Birth control/protection: None   Other Topics Concern    Not on file   Social History Narrative    · Most recent tobacco use screenin2017      · Do you currently or have you served in the [employer]:   No      · Were you activated, into active duty, as a member of the [employer] or as a Reservist:   No      · Exercise level:   Moderate      · Caffeine intake:   Moderate  2-3 cups a day     · Seat belts used routinely:   Yes      · Smoke alarm in home:   Yes      · Advance directive:   Yes      · Live alone or with others:   with others      · Are there stairs in your home:   Yes      · Pets:   Yes     As per [place]      Social Determinants of Health     Financial Resource Strain: Not on file   Food Insecurity: Not on file   Transportation Needs: Not on file   Physical Activity: Not on file   Stress: Not on file   Social Connections: Not on file   Intimate Partner Violence: Not on file   Housing Stability: Not on file         Current Outpatient Medications:     bisoprolol-hydrochlorothiazide (ZIAC) 10-6.25 MG per tablet, Take 1 tablet by mouth daily, Disp: 90 tablet, Rfl: 0    DULoxetine (CYMBALTA) 30 mg delayed release capsule, Take 1 capsule (30 mg total) by mouth daily, Disp: 90 capsule, Rfl: 0    DULoxetine (CYMBALTA) 60 mg delayed release capsule, Take 1 capsule (60 mg total) by mouth daily, Disp: 90 capsule, Rfl: 0    gabapentin  (NEURONTIN) 400 mg capsule, Take 1 capsule (400 mg total) by mouth 3 (three) times a day, Disp: 270 capsule, Rfl: 0    zolpidem (AMBIEN) 5 mg tablet, Take 1 tablet (5 mg total) by mouth daily at bedtime as needed for sleep, Disp: 30 tablet, Rfl: 0    benzonatate (TESSALON) 200 MG capsule, Take 1 capsule (200 mg total) by mouth 3 (three) times a day as needed for cough (Patient not taking: Reported on 6/6/2024), Disp: 20 capsule, Rfl: 0    ferrous sulfate 325 (65 Fe) mg tablet, Take 325 mg by mouth daily with breakfast (Patient not taking: Reported on 6/6/2024), Disp: , Rfl:     senna (SENOKOT) 8.6 MG tablet, Take 1 tablet by mouth 2 (two) times a day (Patient not taking: Reported on 6/6/2024), Disp: , Rfl:     valACYclovir (VALTREX) 1,000 mg tablet, Take 1 tablet (1,000 mg total) by mouth 3 (three) times a day for 7 days (Patient not taking: Reported on 6/6/2024), Disp: 21 tablet, Rfl: 0  No Known Allergies    Objective    There were no vitals taken for this visit.   Physical Exam  Constitutional:       General: She is not in acute distress.     Appearance: She is well-developed.   HENT:      Head: Normocephalic and atraumatic.   Eyes:      General: No scleral icterus.     Conjunctiva/sclera: Conjunctivae normal.   Pulmonary:      Effort: Pulmonary effort is normal. No respiratory distress.   Neurological:      Mental Status: She is alert and oriented to person, place, and time.         Result Review  Labs:  Appointment on 05/17/2024   Component Date Value Ref Range Status    WBC 05/17/2024 5.67  4.31 - 10.16 Thousand/uL Final    RBC 05/17/2024 4.89  3.81 - 5.12 Million/uL Final    Hemoglobin 05/17/2024 14.3  11.5 - 15.4 g/dL Final    Hematocrit 05/17/2024 43.7  34.8 - 46.1 % Final    MCV 05/17/2024 89  82 - 98 fL Final    MCH 05/17/2024 29.2  26.8 - 34.3 pg Final    MCHC 05/17/2024 32.7  31.4 - 37.4 g/dL Final    RDW 05/17/2024 15.2 (H)  11.6 - 15.1 % Final    MPV 05/17/2024 11.2  8.9 - 12.7 fL Final    Platelets  05/17/2024 193  149 - 390 Thousands/uL Final    nRBC 05/17/2024 0  /100 WBCs Final    Segmented % 05/17/2024 59  43 - 75 % Final    Immature Grans % 05/17/2024 0  0 - 2 % Final    Lymphocytes % 05/17/2024 25  14 - 44 % Final    Monocytes % 05/17/2024 9  4 - 12 % Final    Eosinophils Relative 05/17/2024 6  0 - 6 % Final    Basophils Relative 05/17/2024 1  0 - 1 % Final    Absolute Neutrophils 05/17/2024 3.39  1.85 - 7.62 Thousands/µL Final    Absolute Immature Grans 05/17/2024 0.02  0.00 - 0.20 Thousand/uL Final    Absolute Lymphocytes 05/17/2024 1.41  0.60 - 4.47 Thousands/µL Final    Absolute Monocytes 05/17/2024 0.49  0.17 - 1.22 Thousand/µL Final    Eosinophils Absolute 05/17/2024 0.32  0.00 - 0.61 Thousand/µL Final    Basophils Absolute 05/17/2024 0.04  0.00 - 0.10 Thousands/µL Final    Vitamin B-12 05/17/2024 1,342 (H)  180 - 914 pg/mL Final    Folate 05/17/2024 11.1  >5.9 ng/mL Final    The World Health Organization has determined deficient folate concentrations are considered to be <4.0 ng/mL.    Iron Saturation 05/17/2024 27  15 - 50 % Final    TIBC 05/17/2024 304  250 - 450 ug/dL Final    Iron 05/17/2024 81  50 - 212 ug/dL Final    Patients treated with metal-binding drugs (ie. Deferoxamine) may have depressed iron values.    UIBC 05/17/2024 223  155 - 355 ug/dL Final    Ferritin 05/17/2024 229  11 - 307 ng/mL Final       Please note:  This report has been generated by a voice recognition software system. Therefore there may be syntax, spelling, and/or grammatical errors. Please call if you have any questions.    VIRTUAL VISIT DISCLAIMER    Silvia Antonio acknowledges that he has consented to an online visit or consultation. she understands that the online visit is based solely on information provided by her, and that, in the absence of a face-to-face physical evaluation by the physician, the diagnosis she receives is both limited and provisional in terms of accuracy and completeness. This is not intended to  replace a full medical face-to-face evaluation by the physician. Silvia Antonio understands and accepts these terms.

## 2024-06-07 DIAGNOSIS — M79.7 FIBROMYALGIA, PRIMARY: ICD-10-CM

## 2024-06-07 DIAGNOSIS — F41.9 ANXIETY: ICD-10-CM

## 2024-06-07 RX ORDER — DULOXETIN HYDROCHLORIDE 30 MG/1
30 CAPSULE, DELAYED RELEASE ORAL DAILY
Qty: 30 CAPSULE | Refills: 0 | Status: SHIPPED | OUTPATIENT
Start: 2024-06-07

## 2024-06-07 RX ORDER — DULOXETIN HYDROCHLORIDE 60 MG/1
60 CAPSULE, DELAYED RELEASE ORAL DAILY
Qty: 30 CAPSULE | Refills: 0 | Status: SHIPPED | OUTPATIENT
Start: 2024-06-07

## 2024-06-10 PROCEDURE — NC001 PR NO CHARGE: Performed by: SURGERY

## 2024-06-10 NOTE — H&P
ASSESSMENT/PLAN:       63-year-old female with right index, long, and ring trigger fingers     Patient experienced little to no improvement with steroid injections.  We discussed next steps in treatment including repeat injection versus surgical release of the affected fingers.  Risk benefits to both options were reviewed, patient wishes to proceed with surgical release of the right index, long, and ring trigger fingers under local anesthesia.  Surgical procedure including risks and benefits were discussed and informed consent was signed today.  Postoperative protocol and expectations were reviewed and all questions answered.  Patient does work in the inpatient pharmacy, will likely need to take roughly 2 weeks off of work until her sutures are removed and she is reevaluated in the office.  Will plan on starting therapy prior to her first postop visit.  Follow-up 10 to 14 days following surgery for postop evaluation and suture removal  Of note, she is starting iron infusions shortly which will take place over the next 7 weeks, will plan for surgery after this. BMI was also discussed as it pertains to surgical intervention for the hand     The patient verbalized understanding of exam findings and treatment plan. We engaged in the shared decision-making process and treatment options were discussed at length with the patient. Surgical and conservative management discussed today along with risks and benefits.     Diagnoses and all orders for this visit:     Trigger middle finger of right hand  -     Case request operating room: RELEASE TRIGGER FINGER RIGHT INDEX, LONG, RING; Standing  -     Case request operating room: RELEASE TRIGGER FINGER RIGHT INDEX, LONG, RING     Trigger index finger of right hand  -     Case request operating room: RELEASE TRIGGER FINGER RIGHT INDEX, LONG, RING; Standing  -     Case request operating room: RELEASE TRIGGER FINGER RIGHT INDEX, LONG, RING     Trigger finger, right ring finger  -      Case request operating room: RELEASE TRIGGER FINGER RIGHT INDEX, LONG, RING; Standing  -     Case request operating room: RELEASE TRIGGER FINGER RIGHT INDEX, LONG, RING     Class 3 severe obesity due to excess calories with serious comorbidity and body mass index (BMI) of 50.0 to 59.9 in adult (HCC)     Other orders  -     Nursing Communication Warmimg Interventions Implemented; Standing  -     Nursing Communication CHG bath, have staff wash entire body (neck down) per pre-op bathing protocol. Routine, evening prior to, and day of surgery.; Standing  -     Nursing Communication Swab both nares with Povidone-Iodine solution, EXCLUDE if patient has shellfish/Iodine allergy, and replace with nasal alcohol swabstick. Routine, day of surgery, on call to OR; Standing  -     chlorhexidine (PERIDEX) 0.12 % oral rinse 15 mL  -     Void on call to OR; Standing             Trigger Finger Release: The anatomy and physiology of trigger finger was discussed with the patient today in the office.  Edema and increased contact pressure within the flexor tendons at the A1 pulley can cause pain, crepitation, and limitation of function.  Treatment options include resting MP blocking splints to decrease edema, oral anti-inflammatory medications, home or formal therapy exercises, up to 2 steroid injections or surgical release.  While majority of patients do respond to conservative treatment, up to 20% may require surgical release.  The patient has elected release of the trigger finger.  The patient has elected to undergo a release of the A1 pulley (trigger finger).  A small incision will be made over the palmar aspect of the hand, the tendon sheath holding the flexor tendons will be released.  In the postoperative period, light activities are allowed immediately, driving is allowed when narcotic medication has stopped, and the incision may get wet after 2 days.  Heavy activities (lifting more than approximately 10 pounds) will be allowed  after the follow up appointment in 1-2 weeks.  While the pain and discomfort within the wrist typically improves rapidly, some residual discomfort may be present for up to 6 weeks.  The nodule that is typically palpable in the palmar aspect of the hand will not be removed, as this would necessitate removal of a portion of the flexor tendon, however the catching, clicking, and locking should resolve.  Approximate success rate is 98%.The risks and benefits of the procedure were explained to the patient, which include, but are not limited to: Bleeding, infection, recurrence, pain, scar, damage to tendons, damage to nerves, and damage to blood vessels, need for future surgery and complications related to anesthesia.  If bony work is done, risks also include malunion and nonunion.  These risks, along with alternative conservative treatment options, and postoperative protocols were voiced back and understood by the patient.  All questions were answered to the patient's satisfaction.  The patient agrees to comply with a standard postoperative protocol, and is willing to proceed.  Education was provided via written and auditory forms.  There were no barriers to learning. Written handouts regarding wound care, incision and scar care, and general preoperative information, as well as risks and benefits were provided to the patient.     Follow Up:  Return for After Surgery.        To Do Next Visit:  Re-evaluation of current issue     ____________________________________________________________________________________________________________________________________________        CHIEF COMPLAINT:       Chief Complaint   Patient presents with    Follow-up       Last injection only lasted a day or two.         SUBJECTIVE:  Silvia Antonio is a 63 y.o. year old RHD female who presents presents for follow-up evaluation of multiple right hand trigger fingers.  Patient has undergone 2 injections in the past, 1 for each finger, which  have not helped her symptoms significantly.  She continues to note painful clicking and locking of the index, long, and ring fingers intermittently as well as swelling and stiffness in the long finger.  She would like to discuss next steps today.         I have personally reviewed all the relevant PMH, PSH, SH, FH, Medications and allergies.      PAST MEDICAL HISTORY:       Past Medical History:   Diagnosis Date    Anemia      Anxiety      Arthritis      Fibromyalgia      Headache(784.0)      High blood pressure      Hypertension      Obesity      Panic attacks      Shingles           PAST SURGICAL HISTORY:        Past Surgical History:   Procedure Laterality Date    CHOLECYSTECTOMY        HERNIA REPAIR        KNEE SURGERY        HERIBERTO-EN-Y PROCEDURE        SHOULDER OPEN ROTATOR CUFF REPAIR        SPINAL FUSION        TUBAL LIGATION             FAMILY HISTORY:        Family History   Problem Relation Age of Onset    Hypertension Mother      COPD Mother      Heart attack Father           late 50s     Arthritis Father      Pulmonary embolism Father      No Known Problems Sister      Hypertension Brother      Alcohol abuse Brother      No Known Problems Maternal Grandmother      No Known Problems Paternal Grandmother      No Known Problems Maternal Aunt      No Known Problems Paternal Aunt      Breast cancer Neg Hx           SOCIAL HISTORY:  Social History            Tobacco Use    Smoking status: Former       Current packs/day: 0.00       Average packs/day: 0.5 packs/day for 37.4 years (18.7 ttl pk-yrs)       Types: Cigarettes       Start date: 1980       Quit date: 2017       Years since quittin.7    Smokeless tobacco: Never   Vaping Use    Vaping status: Never Used   Substance Use Topics    Alcohol use: Yes       Alcohol/week: 3.0 standard drinks of alcohol       Types: 3 Cans of beer per week       Comment: 3 per week    Drug use: Never       Comment: Illicit drugs:   no  - As per [place]           MEDICATIONS:     Current Outpatient Medications:     bisoprolol-hydrochlorothiazide (ZIAC) 10-6.25 MG per tablet, Take 1 tablet by mouth daily, Disp: 90 tablet, Rfl: 0    DULoxetine (CYMBALTA) 30 mg delayed release capsule, Take 1 capsule (30 mg total) by mouth daily, Disp: 90 capsule, Rfl: 0    DULoxetine (CYMBALTA) 60 mg delayed release capsule, Take 1 capsule (60 mg total) by mouth daily, Disp: 90 capsule, Rfl: 0    ferrous sulfate 325 (65 Fe) mg tablet, Take 325 mg by mouth daily with breakfast, Disp: , Rfl:     gabapentin (NEURONTIN) 400 mg capsule, Take 1 capsule (400 mg total) by mouth 3 (three) times a day, Disp: 270 capsule, Rfl: 0    zolpidem (AMBIEN) 5 mg tablet, Take 1 tablet (5 mg total) by mouth daily at bedtime as needed for sleep, Disp: 30 tablet, Rfl: 0    benzonatate (TESSALON) 200 MG capsule, Take 1 capsule (200 mg total) by mouth 3 (three) times a day as needed for cough, Disp: 20 capsule, Rfl: 0    senna (SENOKOT) 8.6 MG tablet, Take 1 tablet by mouth 2 (two) times a day, Disp: , Rfl:     valACYclovir (VALTREX) 1,000 mg tablet, Take 1 tablet (1,000 mg total) by mouth 3 (three) times a day for 7 days, Disp: 21 tablet, Rfl: 0     ALLERGIES:  No Known Allergies     REVIEW OF SYSTEMS:  Review of Systems   Constitutional:  Negative for chills, fatigue and fever.   HENT:  Negative for hearing loss, nosebleeds and sore throat.    Eyes:  Negative for redness and visual disturbance.   Respiratory:  Negative for cough, shortness of breath and wheezing.    Cardiovascular:  Negative for chest pain, palpitations and leg swelling.   Gastrointestinal:  Negative for abdominal pain, nausea and vomiting.   Endocrine: Negative for polydipsia and polyuria.   Genitourinary:  Negative for difficulty urinating, dysuria and hematuria.   Musculoskeletal:  Positive for arthralgias. Negative for joint swelling and myalgias.   Skin:  Negative for rash and wound.   Neurological:  Negative for speech difficulty, weakness,  numbness and headaches.   Psychiatric/Behavioral:  Negative for decreased concentration and suicidal ideas. The patient is not nervous/anxious.          VITALS:  There were no vitals filed for this visit.     LABS:  HgA1c:         Lab Results   Component Value Date     HGBA1C 5.8 (H) 01/10/2024      BMP:         Lab Results   Component Value Date     CALCIUM 9.2 09/08/2023     K 4.3 09/08/2023     CO2 28 09/08/2023      09/08/2023     BUN 17 09/08/2023     CREATININE 0.91 09/08/2023         _____________________________________________________  PHYSICAL EXAMINATION:  General: well developed and well nourished, alert, oriented times 3, and appears comfortable  Psychiatric: Normal  HEENT: Normocephalic, Atraumatic Trachea Midline, No torticollis  Pulmonary: No audible wheezing or respiratory distress   Cardiovascular: No pitting edema, 2+ radial pulse   Skin: No masses, erythema, lacerations, fluctation, ulcerations  Neurovascular: Sensation Intact to the Median, Ulnar, Radial Nerve, Motor Intact to the Median, Ulnar, Radial Nerve, and Pulses Intact  Musculoskeletal: Normal, except as noted in detailed exam and in HPI.        MUSCULOSKELETAL EXAMINATION:  right index, long, and ring fingers:  Negative palpable nodule over the A1 pulley.  Positive tenderness to palpation over A1 pulley. Negative catching. Positive clicking.

## 2024-06-11 ENCOUNTER — HOSPITAL ENCOUNTER (OUTPATIENT)
Facility: HOSPITAL | Age: 64
Setting detail: OUTPATIENT SURGERY
Discharge: HOME | End: 2024-06-11
Attending: SURGERY | Admitting: SURGERY
Payer: COMMERCIAL

## 2024-06-11 VITALS
BODY MASS INDEX: 51.91 KG/M2 | HEART RATE: 53 BPM | RESPIRATION RATE: 16 BRPM | SYSTOLIC BLOOD PRESSURE: 119 MMHG | HEIGHT: 63 IN | TEMPERATURE: 96.9 F | OXYGEN SATURATION: 96 % | WEIGHT: 293 LBS | DIASTOLIC BLOOD PRESSURE: 60 MMHG

## 2024-06-11 DIAGNOSIS — M65.341 TRIGGER FINGER OF ALL DIGITS OF RIGHT HAND: Primary | ICD-10-CM

## 2024-06-11 DIAGNOSIS — M65.351 TRIGGER FINGER OF ALL DIGITS OF RIGHT HAND: Primary | ICD-10-CM

## 2024-06-11 DIAGNOSIS — M65.321 TRIGGER FINGER OF ALL DIGITS OF RIGHT HAND: Primary | ICD-10-CM

## 2024-06-11 DIAGNOSIS — M65.311 TRIGGER FINGER OF ALL DIGITS OF RIGHT HAND: Primary | ICD-10-CM

## 2024-06-11 DIAGNOSIS — M65.331 TRIGGER FINGER OF ALL DIGITS OF RIGHT HAND: Primary | ICD-10-CM

## 2024-06-11 PROCEDURE — 26055 INCISE FINGER TENDON SHEATH: CPT | Performed by: SURGERY

## 2024-06-11 PROCEDURE — 26055 INCISE FINGER TENDON SHEATH: CPT | Performed by: PHYSICIAN ASSISTANT

## 2024-06-11 RX ORDER — MAGNESIUM HYDROXIDE 1200 MG/15ML
LIQUID ORAL AS NEEDED
Status: DISCONTINUED | OUTPATIENT
Start: 2024-06-11 | End: 2024-06-11 | Stop reason: HOSPADM

## 2024-06-11 RX ORDER — HYDROCODONE BITARTRATE AND ACETAMINOPHEN 5; 325 MG/1; MG/1
1 TABLET ORAL EVERY 6 HOURS PRN
Qty: 10 TABLET | Refills: 0 | Status: SHIPPED | OUTPATIENT
Start: 2024-06-11 | End: 2024-06-19

## 2024-06-11 RX ORDER — HYDROCODONE BITARTRATE AND ACETAMINOPHEN 5; 325 MG/1; MG/1
1 TABLET ORAL EVERY 6 HOURS PRN
Status: DISCONTINUED | OUTPATIENT
Start: 2024-06-11 | End: 2024-06-11 | Stop reason: HOSPADM

## 2024-06-11 NOTE — OP NOTE
OPERATIVE REPORT  PATIENT NAME: Silvia Antonio  :  1960  MRN: [id number]  Pt Location: [facility]    SURGERY DATE: 24    Surgeons and Role:     * Irina Clay MD - Primary     * Luther Mancia PA-C - Assisting    Pre-Op Diagnosis:  Trigger middle finger of right hand [M65.331]  Trigger index finger of right hand [M65.321]  Trigger finger, right ring finger [M65.341]    Post-Op Diagnosis Codes:     * Trigger middle finger of right hand [M65.331]     * Trigger index finger of right hand [M65.321]     * Trigger finger, right ring finger [M65.341]    Procedure(s):  RELEASE TRIGGER FINGER RIGHT INDEX, LONG & RING (Right)    Specimen(s):  No specimens collected during this procedure.    Estimated Blood Loss:   Minimal    Anesthesia Type:   Local    IMPLANTS:  * No implants in log *    PERIOPERATIVE ANTIBIOTICS:    None     Tourniquet Time: 13 min  at 250 mmHg          Operative Indications:  The patient has a history of Trigger Finger  right  index finger, long finger, ring finger that was recalcitrant to conservative management.  The decision was made to bring the patient to the operating room for Trigger Finger Release  right  index finger, long finger, ring finger.  Risks of the procedure were explained which include, but are not limited to bleeding; infection; damage to nerves, arteries,veins, tendons; scar; pain; need for reoperation; failure to give desired result; and risks of anaesthesia.  All questions were answered to satisfaction and they were willing to proceed.         Operative Findings:  Hypertrophy A1 pulleys long, ring and index   Significant interstitial  tear index and ring of fds, debrided     Complications:   None    Procedure and Technique:  After the patient, site, and procedure were identified, the patient was brought into the operating room in a supine position.  Local anaesthesia was provided.  A well padded tourniquet was applied to the extremity, set at 250 mmHg.  The   left upper extremity was then prepped and drapped in a normal, sterile, orthopedic fashion.       A  longitudinal  incision is made in line with the ring finger overlying the A1 pulley.. Dissection was  carried down under loupe magnification. Skin and subcutaneous tissues were sharply incised. The tissue was elevated off the A1 pulley. The A1 pulley was  identified and incised. There was no retinacular cyst noted. The FDS and FDP were noted to have independent glide after release, fraying of the FDS tendon was noted and was debrided.    A  longitudinal  incision is made in line with the long finger overlying the A1 pulley.. Dissection was  carried down under loupe magnification. Skin and subcutaneous tissues were sharply incised. The tissue was elevated off the A1 pulley. The A1 pulley was  identified and incised. There was no retinacular cyst noted. Tenosynovectomy was carried out. The FDS and FDP were noted to have independent glide after tenosynovectomy.    A  longitudinal  incision is made in line with the index finger overlying the A1 pulley.. Dissection was  carried down under loupe magnification. Skin and subcutaneous tissues were sharply incised. The tissue was elevated off the A1 pulley. The A1 pulley was  identified and incised. There was no retinacular cyst noted. Tenosynovectomy was carried out. The FDS and FDP were noted to have independent glide after tenosynovectomy.  Significant interstitial tearing of the ulnar slip of the FDS debrided with a scissor . the patient was able to flex and extend without any triggering.  Ring finger was 2 mm to palm, long finger was 8 millimeters to palm, was within 1.2 cm from the palm .       At the completion of the procedure, hemostasis was obtained with cautery and direct pressure.  The wounds were copiously irrigated with sterile solution.  The wounds were closed with Prolene.  Sterile dressings were applied, including Xeroform, gauze, tweeners, webril, ACE.   Please note, all sponge, needle, and instrument counts were correct prior to closure.  Loupe magnification was utilized.  The patient tolerated the procedure well.     I was present for the entire procedure., A qualified resident physician was not available., and A physician assistant was required during the procedure for retraction, tissue handling, dissection and suturing.    Patient Disposition:  PACU     SIGNATURE: Irina Clay MD  DATE: 06/11/24  TIME: 8:16 AM

## 2024-06-19 ENCOUNTER — OFFICE VISIT (OUTPATIENT)
Dept: OBGYN CLINIC | Facility: CLINIC | Age: 64
End: 2024-06-19

## 2024-06-19 VITALS
BODY MASS INDEX: 51.91 KG/M2 | DIASTOLIC BLOOD PRESSURE: 71 MMHG | HEIGHT: 63 IN | SYSTOLIC BLOOD PRESSURE: 111 MMHG | WEIGHT: 293 LBS | HEART RATE: 57 BPM

## 2024-06-19 DIAGNOSIS — L03.119 CELLULITIS OF HAND: ICD-10-CM

## 2024-06-19 DIAGNOSIS — M65.321 TRIGGER INDEX FINGER OF RIGHT HAND: ICD-10-CM

## 2024-06-19 DIAGNOSIS — M65.331 TRIGGER MIDDLE FINGER OF RIGHT HAND: Primary | ICD-10-CM

## 2024-06-19 DIAGNOSIS — M65.341 TRIGGER FINGER, RIGHT RING FINGER: ICD-10-CM

## 2024-06-19 PROCEDURE — 99024 POSTOP FOLLOW-UP VISIT: CPT | Performed by: PHYSICIAN ASSISTANT

## 2024-06-19 RX ORDER — CEPHALEXIN 500 MG/1
500 CAPSULE ORAL EVERY 8 HOURS SCHEDULED
Qty: 15 CAPSULE | Refills: 0 | Status: SHIPPED | OUTPATIENT
Start: 2024-06-19 | End: 2024-06-24

## 2024-06-19 NOTE — LETTER
June 19, 2024     Patient: Silvia Antonio  YOB: 1960  Date of Visit: 6/19/2024      To Whom it May Concern:    Silvia Antonio is under my professional care. Silvia was seen in my office on 6/19/2024. Silvia may return to work without restrictions on 6/25/24.    If you have any questions or concerns, please don't hesitate to call.         Sincerely,          Marnie Ding PA-C        CC: No Recipients

## 2024-06-19 NOTE — PROGRESS NOTES
Assessment/Plan:  Patient ID: Silvia Antonio 63 y.o. female   Surgery: Release Trigger Finger Right Index, Long & Ring - Right  Date of Surgery: 6/11/2024    Sutures removed today  Index site with some erythema and pain - keflex sent as a precaution   Begin therapy exercises for motion. Therapy script placed if she feels she is unable to make progress on her own   RTW Tuesday, note provided  Patient will call if any issues       Follow Up:  PRN    To Do Next Visit:         CHIEF COMPLAINT:  Chief Complaint   Patient presents with    Post-op     right index, long, and ring trigger fingers         SUBJECTIVE:  Silvia Antonio is a 63 y.o. year old female who presents for follow up after Release Trigger Finger Right Index, Long & Ring - Right. Today patient has some stiffness in the hand but minimal pain. Index finger surgical site is slightly red and sore but she states it is feeling better. No fever or chills.       PHYSICAL EXAMINATION:  General: well developed and well nourished, alert, oriented times 3, and appears comfortable  Psychiatric: Normal    MUSCULOSKELETAL EXAMINATION:  Incision: Clean, dry, intact  Surgery Site: normal, no evidence of infection   Range of Motion: Limited due to stiffness  Neurovascular status: Neuro intact, good cap refill  Activity Restrictions: No restrictions  Done today: Sutures out        STUDIES REVIEWED:  No studies to review       LABS REVIEWED:    HgA1c:   Lab Results   Component Value Date    HGBA1C 5.8 (H) 01/10/2024     BMP:   Lab Results   Component Value Date    CALCIUM 9.2 09/08/2023    K 4.3 09/08/2023    CO2 28 09/08/2023     09/08/2023    BUN 17 09/08/2023    CREATININE 0.91 09/08/2023           PROCEDURES PERFORMED:  Procedures  No Procedures performed today      Marnie Ding

## 2024-06-25 ENCOUNTER — TELEPHONE (OUTPATIENT)
Dept: OBGYN CLINIC | Facility: CLINIC | Age: 64
End: 2024-06-25

## 2024-06-25 NOTE — TELEPHONE ENCOUNTER
Left VM to see how patient was feeling in regards to the trigger fingers. We did send her a few days of keflex due to some erythema at the one site. If no issues no need to return call, but I did provide office number if needed.

## 2024-07-02 ENCOUNTER — OFFICE VISIT (OUTPATIENT)
Dept: FAMILY MEDICINE CLINIC | Facility: CLINIC | Age: 64
End: 2024-07-02
Payer: COMMERCIAL

## 2024-07-02 VITALS
OXYGEN SATURATION: 97 % | WEIGHT: 293 LBS | HEIGHT: 63 IN | TEMPERATURE: 98.7 F | DIASTOLIC BLOOD PRESSURE: 82 MMHG | SYSTOLIC BLOOD PRESSURE: 132 MMHG | HEART RATE: 65 BPM | RESPIRATION RATE: 16 BRPM | BODY MASS INDEX: 51.91 KG/M2

## 2024-07-02 DIAGNOSIS — I10 ESSENTIAL HYPERTENSION: ICD-10-CM

## 2024-07-02 DIAGNOSIS — F41.1 GENERALIZED ANXIETY DISORDER: ICD-10-CM

## 2024-07-02 DIAGNOSIS — Z00.00 ANNUAL PHYSICAL EXAM: Primary | ICD-10-CM

## 2024-07-02 DIAGNOSIS — Z12.31 ENCOUNTER FOR SCREENING MAMMOGRAM FOR MALIGNANT NEOPLASM OF BREAST: ICD-10-CM

## 2024-07-02 DIAGNOSIS — G62.9 NEUROPATHY: ICD-10-CM

## 2024-07-02 DIAGNOSIS — E66.01 CLASS 3 SEVERE OBESITY DUE TO EXCESS CALORIES WITH SERIOUS COMORBIDITY AND BODY MASS INDEX (BMI) OF 50.0 TO 59.9 IN ADULT (HCC): ICD-10-CM

## 2024-07-02 DIAGNOSIS — R73.03 PREDIABETES: ICD-10-CM

## 2024-07-02 DIAGNOSIS — Z86.2 HISTORY OF IRON DEFICIENCY ANEMIA: ICD-10-CM

## 2024-07-02 DIAGNOSIS — M79.7 FIBROMYALGIA, PRIMARY: ICD-10-CM

## 2024-07-02 DIAGNOSIS — G89.29 CHRONIC BILATERAL LOW BACK PAIN WITHOUT SCIATICA: ICD-10-CM

## 2024-07-02 DIAGNOSIS — R09.89 DECREASED DORSALIS PEDIS PULSE: ICD-10-CM

## 2024-07-02 DIAGNOSIS — F51.01 PRIMARY INSOMNIA: ICD-10-CM

## 2024-07-02 DIAGNOSIS — M54.50 CHRONIC BILATERAL LOW BACK PAIN WITHOUT SCIATICA: ICD-10-CM

## 2024-07-02 DIAGNOSIS — E55.9 VITAMIN D DEFICIENCY: ICD-10-CM

## 2024-07-02 DIAGNOSIS — E53.8 B12 DEFICIENCY: ICD-10-CM

## 2024-07-02 DIAGNOSIS — F41.9 ANXIETY: ICD-10-CM

## 2024-07-02 DIAGNOSIS — G89.4 CHRONIC PAIN SYNDROME: ICD-10-CM

## 2024-07-02 PROBLEM — N63.11 BREAST LUMP ON RIGHT SIDE AT 10 O'CLOCK POSITION: Status: RESOLVED | Noted: 2020-09-10 | Resolved: 2024-07-02

## 2024-07-02 PROBLEM — Z01.818 PRE-OP EXAM: Status: RESOLVED | Noted: 2022-03-14 | Resolved: 2024-07-02

## 2024-07-02 PROCEDURE — 99396 PREV VISIT EST AGE 40-64: CPT | Performed by: FAMILY MEDICINE

## 2024-07-02 PROCEDURE — 99215 OFFICE O/P EST HI 40 MIN: CPT | Performed by: FAMILY MEDICINE

## 2024-07-02 RX ORDER — DULOXETIN HYDROCHLORIDE 60 MG/1
60 CAPSULE, DELAYED RELEASE ORAL DAILY
Qty: 90 CAPSULE | Refills: 3 | Status: SHIPPED | OUTPATIENT
Start: 2024-07-02

## 2024-07-02 RX ORDER — DULOXETIN HYDROCHLORIDE 30 MG/1
30 CAPSULE, DELAYED RELEASE ORAL DAILY
Qty: 90 CAPSULE | Refills: 3 | Status: SHIPPED | OUTPATIENT
Start: 2024-07-02

## 2024-07-02 RX ORDER — ZOLPIDEM TARTRATE 5 MG/1
5 TABLET ORAL
Qty: 30 TABLET | Refills: 0 | Status: SHIPPED | OUTPATIENT
Start: 2024-07-02

## 2024-07-02 RX ORDER — BISOPROLOL FUMARATE AND HYDROCHLOROTHIAZIDE 10; 6.25 MG/1; MG/1
1 TABLET ORAL DAILY
Qty: 90 TABLET | Refills: 3 | Status: SHIPPED | OUTPATIENT
Start: 2024-07-02

## 2024-07-02 NOTE — ASSESSMENT & PLAN NOTE
Pt feels this is worsening diminshed sensation stocking distribution will check EMG check TSH check HGA1c

## 2024-07-02 NOTE — PROGRESS NOTES
Subjective: Patient here for annual physical Pt is here for interval visit and evaluation of multiple medical problems, review of medications, labs, Health Maintenance and any recent specialty consults hematology          Patient ID: Silvia Antonio is a 63 y.o. female.    HPI    Past Medical History:   Diagnosis Date   • Anemia    • Anxiety    • Arthritis    • Fibromyalgia    • Headache(784.0)    • High blood pressure    • Hypertension    • Obesity    • Panic attacks    • Shingles        Family History   Problem Relation Age of Onset   • Hypertension Mother    • COPD Mother    • Heart attack Father         late 50s    • Arthritis Father    • Pulmonary embolism Father    • No Known Problems Sister    • Hypertension Brother    • Alcohol abuse Brother    • No Known Problems Maternal Grandmother    • No Known Problems Paternal Grandmother    • No Known Problems Maternal Aunt    • No Known Problems Paternal Aunt    • Breast cancer Neg Hx        Past Surgical History:   Procedure Laterality Date   • CHOLECYSTECTOMY     • HERNIA REPAIR     • KNEE SURGERY     • AR TENDON SHEATH INCISION Right 6/11/2024    Procedure: RELEASE TRIGGER FINGER RIGHT INDEX, LONG & RING;  Surgeon: Irina Clay MD;  Location: [facility];  Service: Orthopedics   • HERIBERTO-EN-Y PROCEDURE     • SHOULDER OPEN ROTATOR CUFF REPAIR     • SPINAL FUSION     • TUBAL LIGATION          reports that she quit smoking about 7 years ago. Her smoking use included cigarettes. She started smoking about 44 years ago. She has a 18.7 pack-year smoking history. She has never used smokeless tobacco. She reports current alcohol use of about 3.0 standard drinks of alcohol per week. She reports that she does not use drugs.      Current Outpatient Medications:   •  bisoprolol-hydrochlorothiazide (ZIAC) 10-6.25 MG per tablet, Take 1 tablet by mouth daily, Disp: 90 tablet, Rfl: 3  •  DULoxetine (CYMBALTA) 30 mg delayed release capsule, Take 1 capsule (30 mg total) by mouth  "daily, Disp: 90 capsule, Rfl: 3  •  DULoxetine (CYMBALTA) 60 mg delayed release capsule, Take 1 capsule (60 mg total) by mouth daily, Disp: 90 capsule, Rfl: 3  •  gabapentin (NEURONTIN) 400 mg capsule, Take 1 capsule (400 mg total) by mouth 3 (three) times a day, Disp: 270 capsule, Rfl: 0  •  zolpidem (AMBIEN) 5 mg tablet, Take 1 tablet (5 mg total) by mouth daily at bedtime as needed for sleep, Disp: 30 tablet, Rfl: 0    The following portions of the patient's history were reviewed and updated as appropriate: allergies, current medications, past family history, past medical history, past social history, past surgical history and problem list.    Review of Systems   Constitutional:  Negative for appetite change, chills, fatigue and fever.   Respiratory:  Negative for cough, chest tightness and shortness of breath.    Cardiovascular:  Negative for chest pain, palpitations and leg swelling.   Gastrointestinal:  Negative for abdominal pain, constipation, diarrhea, nausea and vomiting.   Genitourinary:  Negative for difficulty urinating and frequency.   Musculoskeletal:  Negative for arthralgias, back pain, gait problem and neck pain.   Skin:  Negative for rash.   Neurological:  Positive for numbness (both feet). Negative for dizziness, weakness, light-headedness and headaches.   Hematological:  Does not bruise/bleed easily.   Psychiatric/Behavioral:  Negative for dysphoric mood and sleep disturbance. The patient is not nervous/anxious.          PHQ-2/9 Depression Screening    Little interest or pleasure in doing things: 0 - not at all  Feeling down, depressed, or hopeless: 0 - not at all  PHQ-2 Score: 0  PHQ-2 Interpretation: Negative depression screen             Objective:    /82 (BP Location: Left arm, Patient Position: Sitting, Cuff Size: Standard)   Pulse 65   Temp 98.7 °F (37.1 °C) (Tympanic)   Resp 16   Ht 5' 3\" (1.6 m)   Wt (!) 143 kg (315 lb)   SpO2 97%   BMI 55.80 kg/m²      Physical Exam  Vitals " reviewed.   Constitutional:       General: She is not in acute distress.     Appearance: Normal appearance. She is well-developed. She is obese.   HENT:      Head: Normocephalic.      Right Ear: Tympanic membrane, ear canal and external ear normal.      Left Ear: Tympanic membrane, ear canal and external ear normal.      Nose: Nose normal.      Mouth/Throat:      Pharynx: No oropharyngeal exudate.   Eyes:      General: Lids are normal.      Extraocular Movements: Extraocular movements intact.      Conjunctiva/sclera: Conjunctivae normal.      Pupils: Pupils are equal, round, and reactive to light.   Neck:      Thyroid: No thyromegaly.      Vascular: No carotid bruit.   Cardiovascular:      Rate and Rhythm: Normal rate and regular rhythm.      Heart sounds: Normal heart sounds. No murmur heard.     No friction rub.      Comments: Absent DP pulses   Pulmonary:      Effort: Pulmonary effort is normal. No respiratory distress.      Breath sounds: Normal breath sounds. No stridor. No wheezing or rales.   Chest:   Breasts:     Breasts are symmetrical.      Right: Normal. No swelling, bleeding, inverted nipple, mass, nipple discharge, skin change or tenderness.      Left: Normal. No swelling, bleeding, inverted nipple, mass, nipple discharge, skin change or tenderness.   Abdominal:      General: Bowel sounds are normal. There is no distension.      Palpations: Abdomen is soft. There is no mass.      Tenderness: There is no abdominal tenderness. There is no guarding.      Hernia: No hernia is present.   Musculoskeletal:         General: Normal range of motion.      Cervical back: Full passive range of motion without pain, normal range of motion and neck supple.   Lymphadenopathy:      Cervical: No cervical adenopathy.   Skin:     General: Skin is warm and dry.      Findings: No rash.      Comments: Nl appearing moles   Neurological:      General: No focal deficit present.      Mental Status: She is alert and oriented to  person, place, and time. Mental status is at baseline.      Cranial Nerves: No cranial nerve deficit.      Sensory: Sensory deficit (stocking distributuion) present.      Motor: No abnormal muscle tone.      Coordination: Coordination normal.      Gait: Gait normal.      Deep Tendon Reflexes: Reflexes normal. Babinski sign absent on the right side.   Psychiatric:         Mood and Affect: Mood normal.         Speech: Speech normal.         Behavior: Behavior normal.         Thought Content: Thought content normal.         Judgment: Judgment normal.           Recent Results (from the past 8736 hour(s))   Urinalysis with microscopic    Collection Time: 09/08/23  7:01 AM   Result Value Ref Range    Color, UA Yellow Yellow    Clarity, UA Clear Clear    Specific Gravity, UA 1.025 1.001 - 1.030    pH, UA 5.5 5.0, 5.5, 6.0, 6.5, 7.0, 7.5, 8.0    Leukocytes, UA Negative Negative    Nitrite, UA Negative Negative    Protein, UA Negative Negative, Interference- unable to analyze mg/dl    Glucose, UA Negative Negative mg/dl    Ketones, UA Negative Negative mg/dl    Urobilinogen, UA 1.0 0.2, 1.0 E.U./dl E.U./dl    Bilirubin, UA Negative Negative    Occult Blood, UA Negative Negative    RBC, UA None Seen None Seen, 0-1, 1-2, 2-4, 0-5 /hpf    WBC, UA None Seen None Seen, 0-1, 1-2, 0-5, 2-4 /hpf    Epithelial Cells Occasional None Seen, Occasional /hpf    Bacteria, UA Moderate (A) None Seen, Occasional /hpf   CBC and differential    Collection Time: 09/08/23  7:01 AM   Result Value Ref Range    WBC 6.83 4.31 - 10.16 Thousand/uL    RBC 4.79 3.81 - 5.12 Million/uL    Hemoglobin 11.4 (L) 11.5 - 15.4 g/dL    Hematocrit 38.1 34.8 - 46.1 %    MCV 80 (L) 82 - 98 fL    MCH 23.8 (L) 26.8 - 34.3 pg    MCHC 29.9 (L) 31.4 - 37.4 g/dL    RDW 17.0 (H) 11.6 - 15.1 %    MPV 10.6 8.9 - 12.7 fL    Platelets 278 149 - 390 Thousands/uL    nRBC 0 /100 WBCs    Segmented % 67 43 - 75 %    Immature Grans % 0 0 - 2 %    Lymphocytes % 22 14 - 44 %     Monocytes % 6 4 - 12 %    Eosinophils Relative 4 0 - 6 %    Basophils Relative 1 0 - 1 %    Absolute Neutrophils 4.50 1.85 - 7.62 Thousands/µL    Absolute Immature Grans 0.02 0.00 - 0.20 Thousand/uL    Absolute Lymphocytes 1.53 0.60 - 4.47 Thousands/µL    Absolute Monocytes 0.43 0.17 - 1.22 Thousand/µL    Eosinophils Absolute 0.30 0.00 - 0.61 Thousand/µL    Basophils Absolute 0.05 0.00 - 0.10 Thousands/µL   Comprehensive metabolic panel    Collection Time: 09/08/23  7:01 AM   Result Value Ref Range    Sodium 139 135 - 147 mmol/L    Potassium 4.3 3.5 - 5.3 mmol/L    Chloride 104 96 - 108 mmol/L    CO2 28 21 - 32 mmol/L    ANION GAP 7 mmol/L    BUN 17 5 - 25 mg/dL    Creatinine 0.91 0.60 - 1.30 mg/dL    Glucose, Fasting 117 (H) 65 - 99 mg/dL    Calcium 9.2 8.4 - 10.2 mg/dL    AST 16 13 - 39 U/L    ALT 13 7 - 52 U/L    Alkaline Phosphatase 94 34 - 104 U/L    Total Protein 7.5 6.4 - 8.4 g/dL    Albumin 4.1 3.5 - 5.0 g/dL    Total Bilirubin 0.48 0.20 - 1.00 mg/dL    eGFR 67 ml/min/1.73sq m   Vitamin D 25 hydroxy    Collection Time: 09/08/23  7:01 AM   Result Value Ref Range    Vit D, 25-Hydroxy 17.9 (L) 30.0 - 100.0 ng/mL   TSH, 3rd generation    Collection Time: 09/08/23  7:01 AM   Result Value Ref Range    TSH 3RD GENERATON 2.723 0.450 - 4.500 uIU/mL   Hemoglobin A1C    Collection Time: 09/08/23  7:01 AM   Result Value Ref Range    Hemoglobin A1C 6.1 (H) Normal 4.0-5.6%; PreDiabetic 5.7-6.4%; Diabetic >=6.5%; Glycemic control for adults with diabetes <7.0% %     mg/dl   Lipid panel    Collection Time: 09/08/23  7:01 AM   Result Value Ref Range    Cholesterol 154 See Comment mg/dL    Triglycerides 103 See Comment mg/dL    HDL, Direct 44 (L) >=50 mg/dL    LDL Calculated 89 0 - 100 mg/dL    Non-HDL-Chol (CHOL-HDL) 110 mg/dl   TIBC Panel (incl. Iron, TIBC, % Iron Saturation)    Collection Time: 09/15/23  8:47 AM   Result Value Ref Range    Iron Saturation 11 (L) 15 - 50 %    TIBC 437 250 - 450 ug/dL    Iron 49 (L)  50 - 212 ug/dL    UIBC 388 (H) 155 - 355 ug/dL   Ferritin    Collection Time: 09/15/23  8:47 AM   Result Value Ref Range    Ferritin 7 (L) 11 - 307 ng/mL   Vitamin D 25 hydroxy    Collection Time: 01/10/24  7:06 AM   Result Value Ref Range    Vit D, 25-Hydroxy 41.5 30.0 - 100.0 ng/mL   Hemoglobin A1C    Collection Time: 01/10/24  7:06 AM   Result Value Ref Range    Hemoglobin A1C 5.8 (H) Normal 4.0-5.6%; PreDiabetic 5.7-6.4%; Diabetic >=6.5%; Glycemic control for adults with diabetes <7.0% %     mg/dl   CBC and differential    Collection Time: 01/10/24  7:06 AM   Result Value Ref Range    WBC 8.57 4.31 - 10.16 Thousand/uL    RBC 4.93 3.81 - 5.12 Million/uL    Hemoglobin 13.1 11.5 - 15.4 g/dL    Hematocrit 41.4 34.8 - 46.1 %    MCV 84 82 - 98 fL    MCH 26.6 (L) 26.8 - 34.3 pg    MCHC 31.6 31.4 - 37.4 g/dL    RDW 17.6 (H) 11.6 - 15.1 %    MPV 10.2 8.9 - 12.7 fL    Platelets 254 149 - 390 Thousands/uL    nRBC 0 /100 WBCs    Segmented % 65 43 - 75 %    Immature Grans % 0 0 - 2 %    Lymphocytes % 23 14 - 44 %    Monocytes % 6 4 - 12 %    Eosinophils Relative 5 0 - 6 %    Basophils Relative 1 0 - 1 %    Absolute Neutrophils 5.60 1.85 - 7.62 Thousands/µL    Absolute Immature Grans 0.02 0.00 - 0.20 Thousand/uL    Absolute Lymphocytes 1.97 0.60 - 4.47 Thousands/µL    Absolute Monocytes 0.52 0.17 - 1.22 Thousand/µL    Eosinophils Absolute 0.42 0.00 - 0.61 Thousand/µL    Basophils Absolute 0.04 0.00 - 0.10 Thousands/µL   TIBC Panel (incl. Iron, TIBC, % Iron Saturation)    Collection Time: 01/10/24  7:06 AM   Result Value Ref Range    Iron Saturation 12 (L) 15 - 50 %    TIBC 425 250 - 450 ug/dL    Iron 51 50 - 212 ug/dL    UIBC 374 (H) 155 - 355 ug/dL   Ferritin    Collection Time: 01/10/24  7:06 AM   Result Value Ref Range    Ferritin 14 11 - 307 ng/mL   CBC and differential    Collection Time: 03/14/24 12:50 PM   Result Value Ref Range    WBC 7.68 4.31 - 10.16 Thousand/uL    RBC 4.75 3.81 - 5.12 Million/uL     Hemoglobin 13.1 11.5 - 15.4 g/dL    Hematocrit 40.9 34.8 - 46.1 %    MCV 86 82 - 98 fL    MCH 27.6 26.8 - 34.3 pg    MCHC 32.0 31.4 - 37.4 g/dL    RDW 15.3 (H) 11.6 - 15.1 %    MPV 10.4 8.9 - 12.7 fL    Platelets 247 149 - 390 Thousands/uL    nRBC 0 /100 WBCs    Segmented % 56 43 - 75 %    Immature Grans % 0 0 - 2 %    Lymphocytes % 32 14 - 44 %    Monocytes % 7 4 - 12 %    Eosinophils Relative 4 0 - 6 %    Basophils Relative 1 0 - 1 %    Absolute Neutrophils 4.36 1.85 - 7.62 Thousands/µL    Absolute Immature Grans 0.02 0.00 - 0.20 Thousand/uL    Absolute Lymphocytes 2.43 0.60 - 4.47 Thousands/µL    Absolute Monocytes 0.53 0.17 - 1.22 Thousand/µL    Eosinophils Absolute 0.28 0.00 - 0.61 Thousand/µL    Basophils Absolute 0.06 0.00 - 0.10 Thousands/µL   Vitamin B12    Collection Time: 03/14/24 12:50 PM   Result Value Ref Range    Vitamin B-12 1,281 (H) 180 - 914 pg/mL   Folate    Collection Time: 03/14/24 12:50 PM   Result Value Ref Range    Folate >22.3 >5.9 ng/mL   TIBC Panel (incl. Iron, TIBC, % Iron Saturation)    Collection Time: 03/14/24 12:50 PM   Result Value Ref Range    Iron Saturation 9 (L) 15 - 50 %    TIBC 426 250 - 450 ug/dL    Iron 37 (L) 50 - 212 ug/dL    UIBC 389 (H) 155 - 355 ug/dL   Ferritin    Collection Time: 03/14/24 12:50 PM   Result Value Ref Range    Ferritin 11 11 - 307 ng/mL   CBC and differential    Collection Time: 05/17/24  6:58 AM   Result Value Ref Range    WBC 5.67 4.31 - 10.16 Thousand/uL    RBC 4.89 3.81 - 5.12 Million/uL    Hemoglobin 14.3 11.5 - 15.4 g/dL    Hematocrit 43.7 34.8 - 46.1 %    MCV 89 82 - 98 fL    MCH 29.2 26.8 - 34.3 pg    MCHC 32.7 31.4 - 37.4 g/dL    RDW 15.2 (H) 11.6 - 15.1 %    MPV 11.2 8.9 - 12.7 fL    Platelets 193 149 - 390 Thousands/uL    nRBC 0 /100 WBCs    Segmented % 59 43 - 75 %    Immature Grans % 0 0 - 2 %    Lymphocytes % 25 14 - 44 %    Monocytes % 9 4 - 12 %    Eosinophils Relative 6 0 - 6 %    Basophils Relative 1 0 - 1 %    Absolute Neutrophils  3.39 1.85 - 7.62 Thousands/µL    Absolute Immature Grans 0.02 0.00 - 0.20 Thousand/uL    Absolute Lymphocytes 1.41 0.60 - 4.47 Thousands/µL    Absolute Monocytes 0.49 0.17 - 1.22 Thousand/µL    Eosinophils Absolute 0.32 0.00 - 0.61 Thousand/µL    Basophils Absolute 0.04 0.00 - 0.10 Thousands/µL   Vitamin B12    Collection Time: 05/17/24  6:58 AM   Result Value Ref Range    Vitamin B-12 1,342 (H) 180 - 914 pg/mL   Folate    Collection Time: 05/17/24  6:58 AM   Result Value Ref Range    Folate 11.1 >5.9 ng/mL   TIBC Panel (incl. Iron, TIBC, % Iron Saturation)    Collection Time: 05/17/24  6:58 AM   Result Value Ref Range    Iron Saturation 27 15 - 50 %    TIBC 304 250 - 450 ug/dL    Iron 81 50 - 212 ug/dL    UIBC 223 155 - 355 ug/dL   Ferritin    Collection Time: 05/17/24  6:58 AM   Result Value Ref Range    Ferritin 229 11 - 307 ng/mL       Laboratory Results: I have personally reviewed the pertinent laboratory results/reports     Radiology/Other Diagnostic Testing Results: I have personally reviewed pertinent reports.      No results found.     Assessment/Plan:  1. Annual physical exam  Assessment & Plan:  Check routine labs    Orders:  -     Comprehensive metabolic panel; Future; Expected date: 07/02/2024  -     Lipid panel; Future; Expected date: 07/02/2024  -     TSH, 3rd generation; Future  -     Urinalysis with microscopic  2. Fibromyalgia, primary  Assessment & Plan:  On gabapentin and cymbalta  Orders:  -     DULoxetine (CYMBALTA) 60 mg delayed release capsule; Take 1 capsule (60 mg total) by mouth daily  3. Anxiety  -     DULoxetine (CYMBALTA) 30 mg delayed release capsule; Take 1 capsule (30 mg total) by mouth daily  4. Essential hypertension  Assessment & Plan:  Well controlled on current therapy continue with current medications and will reassess next visit    Orders:  -     bisoprolol-hydrochlorothiazide (ZIAC) 10-6.25 MG per tablet; Take 1 tablet by mouth daily  5. Primary insomnia  Assessment &  "Plan:  Ok on zolpidem  Orders:  -     zolpidem (AMBIEN) 5 mg tablet; Take 1 tablet (5 mg total) by mouth daily at bedtime as needed for sleep  6. B12 deficiency  Assessment & Plan:  Pt seeing hematology  now on  oral only   7. Chronic bilateral low back pain without sciatica  Assessment & Plan:  On chronic gabapentin  8. Chronic pain syndrome  Assessment & Plan:  Cymbalta and gabapentin  9. Class 3 severe obesity due to excess calories with serious comorbidity and body mass index (BMI) of 50.0 to 59.9 in adult (HCC)  Assessment & Plan:  Discussion on diet and exercise guidelines for weight loss and  health reviewed with pt     10. Generalized anxiety disorder  Assessment & Plan:  Ok on cymbalta  11. History of iron deficiency anemia  Assessment & Plan:  Now resolved post iron infusion  12. Prediabetes  Assessment & Plan:  CHECK HGA1C  Orders:  -     Hemoglobin A1C; Future; Expected date: 07/02/2024  13. Vitamin D deficiency  Assessment & Plan:  On supplement    14. Encounter for screening mammogram for malignant neoplasm of breast  -     Mammo screening bilateral w 3d & cad; Future  15. Neuropathy  Assessment & Plan:  Pt feels this is worsening diminshed sensation stocking distribution will check EMG check TSH check HGA1c  Orders:  -     EMG 2 limb lower extremity; Future  16. Decreased dorsalis pedis pulse  Assessment & Plan:  Bilateral check  dopplers   Orders:  -     VAS ARTERIAL DUPLEX- LOWER LIMB BILATERAL; Future; Expected date: 07/02/2024                   Read package inserts for all medications before starting a new medications, call me if you have any questions.    Patient was given opportunity to ask questions and all questions were answered.    Disclaimer: Portions of the record may have been created with voice recognition software. Occasional wrong word or \"sound a like\" substitutions may have occurred due to the inherent limitations of voice recognition software. Read the chart carefully and recognize, " using context, where substitutions have occurred. I have used the Epic copy/forward function to compose this note. I have reviewed my current note to ensure it reflects the current patient status, exam, assessment and plan.

## 2024-07-08 PROBLEM — K91.2 POSTSURGICAL MALABSORPTION: Status: ACTIVE | Noted: 2024-07-08

## 2024-07-08 PROBLEM — Z48.815 ENCOUNTER FOR SURGICAL AFTERCARE FOLLOWING SURGERY OF DIGESTIVE SYSTEM: Status: ACTIVE | Noted: 2024-07-08

## 2024-07-08 NOTE — ASSESSMENT & PLAN NOTE
-s/p OPEN Yoanna-En-Y Gastric Bypass in 1993 at [facility].  Patient is struggling with weight regain and ready to get back on track.  They will work on diet and lifestyle changes - especially 30/60 rule, avoid mindless snacking, increase protein and fiber, avoid sugary drinks, avoid meal skipping, track/log, and exercise. Recommend consult with surgical RD, LCSW, and MWM.     UGI and EGD to r/o GGF and evaluate anatomy.     Initial: 311lbs  Current: 310.2lbs  Victor Hugo: 170lbs  Current BMI is Body mass index is 55.39 kg/m².    Tolerating a regular diet-yes  Eating at least 60 grams of protein per day-no  Following 30/60 minute rule with liquids-no  Drinking at least 64 ounces of fluid per day-yes  Drinking carbonated beverages-yes beer  Sufficient exercise-no limited walking  Using NSAIDs regularly-no  Using nicotine-no  Using alcohol-yes 2 beers weekly. Advised about the risks of alcohol s/p bariatric surgery and recommend avoiding all alcohol

## 2024-07-08 NOTE — ASSESSMENT & PLAN NOTE
-At risk for malabsorption of vitamins/minerals secondary to malabsorption and restriction of intake from bariatric surgery  -NOT Currently taking adequate postop bariatric surgery vitamin supplementation: sublingual B12 3,000mcg, Vitamin D/Calcium unsure - start calcium citrate 500mg TID    -Iron/folate/B12 monitored/managed by Heme - recent labs in May looked good    -Obtain Metabolic panel  -Patient received education about the importance of adhering to a lifelong supplementation regimen to avoid vitamin/mineral deficiencies

## 2024-07-12 ENCOUNTER — OFFICE VISIT (OUTPATIENT)
Dept: BARIATRICS | Facility: CLINIC | Age: 64
End: 2024-07-12
Payer: COMMERCIAL

## 2024-07-12 VITALS
HEART RATE: 59 BPM | BODY MASS INDEX: 51.91 KG/M2 | HEIGHT: 63 IN | SYSTOLIC BLOOD PRESSURE: 126 MMHG | WEIGHT: 293 LBS | DIASTOLIC BLOOD PRESSURE: 80 MMHG

## 2024-07-12 DIAGNOSIS — I10 ESSENTIAL HYPERTENSION: ICD-10-CM

## 2024-07-12 DIAGNOSIS — R63.5 WEIGHT GAIN FOLLOWING GASTRIC BYPASS SURGERY: ICD-10-CM

## 2024-07-12 DIAGNOSIS — Z98.84 WEIGHT GAIN FOLLOWING GASTRIC BYPASS SURGERY: ICD-10-CM

## 2024-07-12 DIAGNOSIS — Z48.815 ENCOUNTER FOR SURGICAL AFTERCARE FOLLOWING SURGERY OF DIGESTIVE SYSTEM: Primary | ICD-10-CM

## 2024-07-12 DIAGNOSIS — K91.2 POSTSURGICAL MALABSORPTION: ICD-10-CM

## 2024-07-12 PROCEDURE — 99204 OFFICE O/P NEW MOD 45 MIN: CPT | Performed by: PHYSICIAN ASSISTANT

## 2024-07-12 RX ORDER — CHOLECALCIFEROL (VITAMIN D3) 25 MCG
1000 TABLET ORAL DAILY
COMMUNITY

## 2024-07-12 NOTE — PROGRESS NOTES
Assessment/Plan:    Encounter for surgical aftercare following surgery of digestive system  -s/p OPEN Yoanna-En-Y Gastric Bypass in 1993 at [facility].  Patient is struggling with weight regain and ready to get back on track.  They will work on diet and lifestyle changes - especially 30/60 rule, avoid mindless snacking, increase protein and fiber, avoid sugary drinks, avoid meal skipping, track/log, and exercise. Recommend consult with surgical RD, LCSW, and MWM.     UGI and EGD to r/o GGF and evaluate anatomy.     Initial: 311lbs  Current: 310.2lbs  Victor Hugo: 170lbs  Current BMI is Body mass index is 55.39 kg/m².    Tolerating a regular diet-yes  Eating at least 60 grams of protein per day-no  Following 30/60 minute rule with liquids-no  Drinking at least 64 ounces of fluid per day-yes  Drinking carbonated beverages-yes beer  Sufficient exercise-no limited walking  Using NSAIDs regularly-no  Using nicotine-no  Using alcohol-yes 2 beers weekly. Advised about the risks of alcohol s/p bariatric surgery and recommend avoiding all alcohol      Postsurgical malabsorption  -At risk for malabsorption of vitamins/minerals secondary to malabsorption and restriction of intake from bariatric surgery  -NOT Currently taking adequate postop bariatric surgery vitamin supplementation: sublingual B12 3,000mcg, Vitamin D/Calcium unsure - start calcium citrate 500mg TID    -Iron/folate/B12 monitored/managed by Heme - recent labs in May looked good    -Obtain Metabolic panel  -Patient received education about the importance of adhering to a lifelong supplementation regimen to avoid vitamin/mineral deficiencies       Essential hypertension  -on Ziac  -Continue monitoring and management with prescribing provider       Diagnoses and all orders for this visit:    Encounter for surgical aftercare following surgery of digestive system    Postsurgical malabsorption  -     Zinc; Future  -     Vitamin D 25 hydroxy; Future  -     Vitamin  B1, whole blood; Future  -     Vitamin A; Future  -     PTH, intact; Future    Essential hypertension    Weight gain following gastric bypass surgery  -     FL UPPER GI UGI; Future    Other orders  -     Cyanocobalamin (VITAMIN B 12 PO); Take by mouth  -     cholecalciferol (VITAMIN D3) 1,000 units tablet; Take 1,000 Units by mouth daily  -     Magnesium Carbonate, Antacid, (MAGNESIUM CARBONATE PO); Take by mouth          Subjective:      Patient ID: Silvia Antonio is a 63 y.o. female.    -s/p OPEN Yoanna-En-Y Gastric Bypass in 1993 at [facility]. Presents to the office today to establish care and for weight regain. Tolerating regular diet; denies N/V, dysphagia, reflux. She lost 140lbs s/p surgery and kept this weight off for about 10 years until she underwent back surgery and several other ortho surgeries and has unfortunately regained all of her weight back. She wishes to pursue MWM.  No hx of pancreatitis and has daily BM's.  Feels good restriction with small portions.    Has 2 [employer] (male and female). Works in [employer]. Lives with her  - very high stress with him. No kids.    Diet Recall:   B - 32oz iced coffee  Snack - varies - special K bar or oatmeal or yogurt or cookies  L - skips   D - chicken or sausage or pork and fried potatoes or corn on cob or baked potato  HS - sweets and cookies or cookies or chips    Fluids - 32oz coffee, rare vitamin water, 2 beers a week, 64oz water    The following portions of the patient's history were reviewed and updated as appropriate: allergies, current medications, past family history, past medical history, past social history, past surgical history and problem list.    Review of Systems   Constitutional:  Positive for unexpected weight change (weight regain). Negative for chills and fever.   HENT:  Negative for trouble swallowing.    Respiratory:  Negative for cough and shortness of breath.    Cardiovascular:  Negative for  "chest pain and palpitations.   Gastrointestinal:  Negative for abdominal pain, constipation, diarrhea, nausea and vomiting.   Neurological:  Negative for dizziness.   Psychiatric/Behavioral:          Denies anxiety and depression         Objective:      /80 (BP Location: Right arm, Patient Position: Sitting, Cuff Size: Large)   Pulse 59   Ht 5' 2.75\" (1.594 m)   Wt (!) 141 kg (310 lb 3.2 oz)   BMI 55.39 kg/m²     Colonoscopy-Completed       Physical Exam  Vitals reviewed.   Constitutional:       General: She is not in acute distress.     Appearance: She is well-developed.   HENT:      Head: Normocephalic and atraumatic.   Eyes:      General: No scleral icterus.  Cardiovascular:      Rate and Rhythm: Normal rate and regular rhythm.   Pulmonary:      Effort: Pulmonary effort is normal. No respiratory distress.   Abdominal:      General: There is no distension.      Palpations: Abdomen is soft.   Skin:     General: Skin is warm and dry.   Neurological:      Mental Status: She is alert.   Psychiatric:         Mood and Affect: Mood normal.         Behavior: Behavior normal.           BARRIERS: none identified    GOALS:   Continued/Maintain healthy weight loss with good nutrition intakes.  Adequate hydration with at least 64oz. fluid intake.  Normal vitamin and mineral levels.  Exercise as tolerated.    Follow-up in 1 year. We kindly ask that your arrive 15 minutes before your scheduled appointment time with your provider to allow our staff to room you, get your vital signs and update your chart.    Follow diet as discussed.      Get lab work done in the next 2 weeks.  You have been given a lab slip today.  Please call the office if you need a replacement.  It is recommended to check with your insurance BEFORE getting labs done to make sure they are covered by your policy.  Also, please check with your PCP and other providers before getting labs to avoid duplicate labs. Make sure to HOLD any multivitamins that " may contain biotin and any biotin supplements FOR 5 DAYS before any labs since it can affect the results.    Follow vitamin and mineral recommendations as reviewed with you.    Call our office if you have any problems with abdominal pain especially associated with fever, chills, nausea, vomiting or any other concerns.    All  Post-bariatric surgery patients should be aware that very small quantities of any alcohol can cause impairment and it is very possible not to feel the effect. The effect can be in the system for several hours.  It is also a stomach irritant.     It is advised to AVOID alcohol, Nonsteroidal antiinflammatory drugs (NSAIDS) and nicotine of all forms . Any of these can cause stomach irritation/pain.

## 2024-07-13 ENCOUNTER — LAB (OUTPATIENT)
Dept: LAB | Facility: HOSPITAL | Age: 64
End: 2024-07-13
Payer: COMMERCIAL

## 2024-07-13 DIAGNOSIS — Z00.00 ANNUAL PHYSICAL EXAM: ICD-10-CM

## 2024-07-13 DIAGNOSIS — R73.03 PREDIABETES: ICD-10-CM

## 2024-07-13 DIAGNOSIS — K91.2 POSTSURGICAL MALABSORPTION: ICD-10-CM

## 2024-07-13 LAB
25(OH)D3 SERPL-MCNC: 30.7 NG/ML (ref 30–100)
ALBUMIN SERPL BCG-MCNC: 4.2 G/DL (ref 3.5–5)
ALP SERPL-CCNC: 76 U/L (ref 34–104)
ALT SERPL W P-5'-P-CCNC: 11 U/L (ref 7–52)
ANION GAP SERPL CALCULATED.3IONS-SCNC: 8 MMOL/L (ref 4–13)
AST SERPL W P-5'-P-CCNC: 16 U/L (ref 13–39)
BACTERIA UR QL AUTO: ABNORMAL /HPF
BILIRUB SERPL-MCNC: 0.61 MG/DL (ref 0.2–1)
BILIRUB UR QL STRIP: NEGATIVE
BUN SERPL-MCNC: 15 MG/DL (ref 5–25)
CALCIUM SERPL-MCNC: 9.6 MG/DL (ref 8.4–10.2)
CHLORIDE SERPL-SCNC: 102 MMOL/L (ref 96–108)
CHOLEST SERPL-MCNC: 172 MG/DL
CLARITY UR: CLEAR
CO2 SERPL-SCNC: 29 MMOL/L (ref 21–32)
COLOR UR: YELLOW
CREAT SERPL-MCNC: 0.87 MG/DL (ref 0.6–1.3)
EST. AVERAGE GLUCOSE BLD GHB EST-MCNC: 111 MG/DL
GFR SERPL CREATININE-BSD FRML MDRD: 71 ML/MIN/1.73SQ M
GLUCOSE P FAST SERPL-MCNC: 97 MG/DL (ref 65–99)
GLUCOSE UR STRIP-MCNC: NEGATIVE MG/DL
HBA1C MFR BLD: 5.5 %
HDLC SERPL-MCNC: 45 MG/DL
HGB UR QL STRIP.AUTO: NEGATIVE
KETONES UR STRIP-MCNC: NEGATIVE MG/DL
LDLC SERPL CALC-MCNC: 102 MG/DL (ref 0–100)
LEUKOCYTE ESTERASE UR QL STRIP: NEGATIVE
NITRITE UR QL STRIP: NEGATIVE
NON-SQ EPI CELLS URNS QL MICRO: ABNORMAL /HPF
NONHDLC SERPL-MCNC: 127 MG/DL
PH UR STRIP.AUTO: 8.5 [PH]
POTASSIUM SERPL-SCNC: 4.2 MMOL/L (ref 3.5–5.3)
PROT SERPL-MCNC: 7.5 G/DL (ref 6.4–8.4)
PROT UR STRIP-MCNC: NEGATIVE MG/DL
PTH-INTACT SERPL-MCNC: 118.3 PG/ML (ref 12–88)
RBC #/AREA URNS AUTO: ABNORMAL /HPF
SODIUM SERPL-SCNC: 139 MMOL/L (ref 135–147)
SP GR UR STRIP.AUTO: 1.01 (ref 1–1.03)
TRIGL SERPL-MCNC: 123 MG/DL
TSH SERPL DL<=0.05 MIU/L-ACNC: 3.15 UIU/ML (ref 0.45–4.5)
UROBILINOGEN UR QL STRIP.AUTO: 4 E.U./DL
WBC #/AREA URNS AUTO: ABNORMAL /HPF

## 2024-07-13 PROCEDURE — 84630 ASSAY OF ZINC: CPT

## 2024-07-13 PROCEDURE — 80061 LIPID PANEL: CPT

## 2024-07-13 PROCEDURE — 83036 HEMOGLOBIN GLYCOSYLATED A1C: CPT

## 2024-07-13 PROCEDURE — 81001 URINALYSIS AUTO W/SCOPE: CPT | Performed by: FAMILY MEDICINE

## 2024-07-13 PROCEDURE — 36415 COLL VENOUS BLD VENIPUNCTURE: CPT

## 2024-07-13 PROCEDURE — 84425 ASSAY OF VITAMIN B-1: CPT

## 2024-07-13 PROCEDURE — 83970 ASSAY OF PARATHORMONE: CPT

## 2024-07-13 PROCEDURE — 84443 ASSAY THYROID STIM HORMONE: CPT

## 2024-07-13 PROCEDURE — 82306 VITAMIN D 25 HYDROXY: CPT

## 2024-07-13 PROCEDURE — 84590 ASSAY OF VITAMIN A: CPT

## 2024-07-13 PROCEDURE — 80053 COMPREHEN METABOLIC PANEL: CPT

## 2024-07-17 DIAGNOSIS — R79.89 HIGH SERUM PARATHYROID HORMONE (PTH): ICD-10-CM

## 2024-07-17 DIAGNOSIS — K91.2 POSTSURGICAL MALABSORPTION: Primary | ICD-10-CM

## 2024-07-17 LAB
VIT A SERPL-MCNC: 32.9 UG/DL (ref 22–69.5)
VIT B1 BLD-SCNC: 122.4 NMOL/L (ref 66.5–200)
ZINC SERPL-MCNC: 71 UG/DL (ref 44–115)

## 2024-07-17 NOTE — RESULT ENCOUNTER NOTE
LMOM stated Annie ARCINIEGA has reviewed recent labs. Annie's detailed message was sent to pt using pts [employer].

## 2024-07-17 NOTE — RESULT ENCOUNTER NOTE
"Please let Silvia know about her labs thank you:    -Your parathyroid hormone (PTH) level is elevated, which likely means that you are not getting enough calcium.   I recommend that you increase your calcium citrate to 4681-8646 mg per day (taken 600 mg at a time, three times per day or 500 mg at a time, four times per day). It is very important that you separate each dose by at least 2 hours and at least 2 hours apart from iron containing supplements. Make sure you ate taking \"calcium citrate\" and not \"calcium carbonate\" or any other form of calcium. Your levels should be rechecked in 4 months    -Your vitamin D is low normal:  Please add an additional 2,000 IU of Vitamin D3 per day in addition to the 3,000IU of Vitamin D you are currently taking in your Bariatric MVI.  Vitamin D is best absorbed with food, so take it with your largest meal of the day. It can be found inexpensively over the counter at your pharmacy or online.    Remember to also take 1500 mg calcium citrate per day total (taken 500 mg at a time, three times per day). It is very important that you separate each dose by at least 2 hours and take calcium at least 2 hours apart from MVI and iron.    "

## 2024-07-19 ENCOUNTER — HOSPITAL ENCOUNTER (OUTPATIENT)
Dept: RADIOLOGY | Facility: HOSPITAL | Age: 64
Discharge: HOME | End: 2024-07-19
Payer: COMMERCIAL

## 2024-07-19 DIAGNOSIS — M79.7 FIBROMYALGIA, PRIMARY: ICD-10-CM

## 2024-07-19 DIAGNOSIS — Z98.84 WEIGHT GAIN FOLLOWING GASTRIC BYPASS SURGERY: ICD-10-CM

## 2024-07-19 DIAGNOSIS — R63.5 WEIGHT GAIN FOLLOWING GASTRIC BYPASS SURGERY: ICD-10-CM

## 2024-07-19 PROCEDURE — 74240 X-RAY XM UPR GI TRC 1CNTRST: CPT

## 2024-07-19 RX ORDER — GABAPENTIN 400 MG/1
400 CAPSULE ORAL 3 TIMES DAILY
Qty: 300 CAPSULE | Refills: 1 | Status: SHIPPED | OUTPATIENT
Start: 2024-07-19

## 2024-07-22 NOTE — RESULT ENCOUNTER NOTE
Please let Silvia know about her UGI results - gastric pouch mildly larger than expected but no evidence of fistula. Mild reflux observed. Continue with EGD and consult with MWM as discussed. Thank you

## 2024-07-31 ENCOUNTER — OFFICE VISIT (OUTPATIENT)
Dept: OBGYN CLINIC | Facility: CLINIC | Age: 64
End: 2024-07-31

## 2024-07-31 VITALS — BODY MASS INDEX: 51.91 KG/M2 | WEIGHT: 293 LBS | HEIGHT: 63 IN

## 2024-07-31 DIAGNOSIS — M65.331 TRIGGER MIDDLE FINGER OF RIGHT HAND: ICD-10-CM

## 2024-07-31 DIAGNOSIS — Z98.890 S/P TRIGGER FINGER RELEASE: ICD-10-CM

## 2024-07-31 DIAGNOSIS — M65.341 TRIGGER FINGER, RIGHT RING FINGER: ICD-10-CM

## 2024-07-31 DIAGNOSIS — M65.321 TRIGGER INDEX FINGER OF RIGHT HAND: Primary | ICD-10-CM

## 2024-07-31 PROCEDURE — 99024 POSTOP FOLLOW-UP VISIT: CPT | Performed by: SURGERY

## 2024-07-31 NOTE — PROGRESS NOTES
Assessment/Plan:  Patient ID: Silvia Antonio 64 y.o. female   Surgery: Release Trigger Finger Right Index, Long & Ring - Right  Date of Surgery: 6/11/2024    Slivia has developed dense scar tissue at the index trigger finger release site, other sites are soft  Discussed that this was the worst of the three trigger fingers and she did have what seemed to be some cellulitis in this area after surgery, all of which can contribute to scar tissue formation   Discussed that I do think skilled OT will help her with scar tissue and stiffness  Order placed today     Follow Up:  6  week(s)    To Do Next Visit:         CHIEF COMPLAINT:  Chief Complaint   Patient presents with    Right Hand - Locking, Pain         SUBJECTIVE:  Silvia Antonio is a 64 y.o. year old female who presents for follow up after Release Trigger Finger Right Index, Long & Ring - Right. Today patient has residual swelling and scar tissue formation at the index finger trigger release site. She notes some stiffness in the finger and hard scar tissue that is bothersome at times. No fever or chills.       PHYSICAL EXAMINATION:  General: well developed and well nourished, alert, oriented times 3, and appears comfortable  Psychiatric: Normal    MUSCULOSKELETAL EXAMINATION:  Incision: Clean, dry, intact and healed  Surgery Site: normal, no evidence of infection , there is dense scar tissue at the index finger site, remaining sites are soft and supple  Range of Motion:  able to make near full composite fist, index finger is slightly more limited  Neurovascular status: Neuro intact, good cap refill  Activity Restrictions: No restrictions           STUDIES REVIEWED:  No studies to review      LABS REVIEWED:    HgA1c:   Lab Results   Component Value Date    HGBA1C 5.5 07/13/2024     BMP:   Lab Results   Component Value Date    CALCIUM 9.6 07/13/2024    K 4.2 07/13/2024    CO2 29 07/13/2024     07/13/2024    BUN 15 07/13/2024    CREATININE 0.87 07/13/2024            PROCEDURES PERFORMED:  Procedures  No Procedures performed today

## 2024-08-06 DIAGNOSIS — F51.01 PRIMARY INSOMNIA: ICD-10-CM

## 2024-08-06 RX ORDER — ZOLPIDEM TARTRATE 5 MG/1
5 TABLET ORAL
Qty: 30 TABLET | Refills: 0 | Status: SHIPPED | OUTPATIENT
Start: 2024-08-06

## 2024-08-12 ENCOUNTER — CLINICAL SUPPORT (OUTPATIENT)
Dept: BARIATRICS | Facility: CLINIC | Age: 64
End: 2024-08-12
Payer: COMMERCIAL

## 2024-08-12 ENCOUNTER — OFFICE VISIT (OUTPATIENT)
Dept: BARIATRICS | Facility: CLINIC | Age: 64
End: 2024-08-12
Payer: COMMERCIAL

## 2024-08-12 VITALS — BODY MASS INDEX: 55.17 KG/M2 | WEIGHT: 293 LBS

## 2024-08-12 VITALS — WEIGHT: 293 LBS | HEIGHT: 63 IN | BODY MASS INDEX: 51.91 KG/M2

## 2024-08-12 VITALS
DIASTOLIC BLOOD PRESSURE: 80 MMHG | WEIGHT: 293 LBS | SYSTOLIC BLOOD PRESSURE: 120 MMHG | HEIGHT: 63 IN | BODY MASS INDEX: 51.91 KG/M2 | HEART RATE: 53 BPM

## 2024-08-12 DIAGNOSIS — K91.2 POSTSURGICAL MALABSORPTION: Primary | ICD-10-CM

## 2024-08-12 DIAGNOSIS — Z48.815 ENCOUNTER FOR SURGICAL AFTERCARE FOLLOWING SURGERY OF DIGESTIVE SYSTEM: Primary | ICD-10-CM

## 2024-08-12 DIAGNOSIS — K91.2 POSTSURGICAL MALABSORPTION: ICD-10-CM

## 2024-08-12 DIAGNOSIS — E66.01 CLASS 3 SEVERE OBESITY DUE TO EXCESS CALORIES WITH SERIOUS COMORBIDITY AND BODY MASS INDEX (BMI) OF 50.0 TO 59.9 IN ADULT (HCC): Primary | ICD-10-CM

## 2024-08-12 DIAGNOSIS — E66.01 MORBID OBESITY WITH BODY MASS INDEX OF 50.0-59.9 IN ADULT (HCC): ICD-10-CM

## 2024-08-12 PROCEDURE — 99204 OFFICE O/P NEW MOD 45 MIN: CPT | Performed by: INTERNAL MEDICINE

## 2024-08-12 PROCEDURE — RECHECK

## 2024-08-12 PROCEDURE — S9470 NUTRITIONAL COUNSELING, DIET: HCPCS

## 2024-08-12 RX ORDER — TIRZEPATIDE 2.5 MG/.5ML
2.5 INJECTION, SOLUTION SUBCUTANEOUS WEEKLY
Qty: 2 ML | Refills: 0 | Status: SHIPPED | OUTPATIENT
Start: 2024-08-12 | End: 2024-10-07

## 2024-08-12 NOTE — PROGRESS NOTES
Patient presents for post op transfer check in, current weight 309lbs.    Eating behaviors/food choices: Patient reports struggles with emotional, mindless eating, trying to get back on track with healthy meals. She reports eating mindful portioned meals during the day but after dinner she picks and nibbles until bed time. She has decreased the amount of snack foods in the house, tried to replace them with protein focused, lower calorie options. Patient may be calorie loading in the evening to compensate for what she didn't get during the day, may skip lunch due to being busy. Encouraged patient to plan out meals, take a mindful minute at each meal to tune into mindset and make well thought out decisions. She does tend to eat quickly when she's hungry, suggested using baby utensils to take smaller bights and to chew well.     Mental Health/Wellness:  Patient reports stress and anxiety with her  and family, some work stress. Things are getting better with her  but there is a lot going on with her family. She does recognizes stress eating in response, may be bored or overtired at night which contributes to the snacking. She is on Cymbalta to manage anxiety, discussed RNY and XR meds, given information sheet to share with her prescriber to discuss further. Patient was in therapy in the past but not currently, discussed the benefits of therapy in managing stress that the eating may be managing. Discussed the importance of reflecting on mindset and energy level when making food choices, encouraged to take mindful minutes so she can make informed decisions. Information about [facility] Any[facility] provided for her to consider, price list for follow up appointments with DERIK and RD also provided.    Next Appointment:  with Dr. Landers on 11/18

## 2024-08-12 NOTE — PROGRESS NOTES
Bariatric Follow Up Nutrition Note    Type of surgery  Gastric bypass: open  Surgery Date:   31 years  post-op  Surgeon: [facility]     Nutrition Assessment   Silvia Antonio  64 y.o.  female    Today's weight:  309#  Body mass index is 55.17 kg/m².    Weight on Day of Weight Loss Surgery: 311#  Weight in (lb) to have BMI = 25: 139#  Pre-Op Excess Wt: 172#  Victor Hugo: 170# maintained for about 10 years  A lot of regain since back sx 2 years ago    Est needs:  1300-1800cals to lose 1-2# per week, suggested an average of 1500cals  75-95gm (based on IBW at BMI 25)    Review of History and Medications   Past Medical History:   Diagnosis Date    Anemia     Anxiety     Arthritis     Fibromyalgia     Headache(784.0)     High blood pressure     Hypertension     Obesity     Panic attacks     Shingles      Past Surgical History:   Procedure Laterality Date    CHOLECYSTECTOMY      HERNIA REPAIR      KNEE SURGERY      NV TENDON SHEATH INCISION Right 2024    Procedure: RELEASE TRIGGER FINGER RIGHT INDEX, LONG & RING;  Surgeon: Irina Clay MD;  Location: [facility];  Service: Orthopedics    HERIBERTO-EN-Y PROCEDURE      SHOULDER OPEN ROTATOR CUFF REPAIR      SPINAL FUSION      TUBAL LIGATION       Social History     Socioeconomic History    Marital status: /Civil Union     Spouse name: Not on file    Number of children: Not on file    Years of education: 12    Highest education level: Not on file   Occupational History    Not on file   Tobacco Use    Smoking status: Former     Current packs/day: 0.00     Average packs/day: 0.5 packs/day for 37.4 years (18.7 ttl pk-yrs)     Types: Cigarettes     Start date: 1980     Quit date: 2017     Years since quittin.2    Smokeless tobacco: Never   Vaping Use    Vaping status: Never Used   Substance and Sexual Activity    Alcohol use: Yes     Alcohol/week: 3.0 standard drinks of alcohol     Types: 3 Cans of beer per week     Comment: 3 per week    Drug use:  Never     Comment: Illicit drugs:   no  - As per Dawson     Sexual activity: Yes     Partners: Male     Birth control/protection: None   Other Topics Concern    Not on file   Social History Narrative    · Most recent tobacco use screenin2017      · Do you currently or have you served in the [employer]:   No      · Were you activated, into active duty, as a member of the [employer] or as a Reservist:   No      · Exercise level:   Moderate      · Caffeine intake:   Moderate  2-3 cups a day     · Seat belts used routinely:   Yes      · Smoke alarm in home:   Yes      · Advance directive:   Yes      · Live alone or with others:   with others      · Are there stairs in your home:   Yes      · Pets:   Yes     As per Dawson      Social Determinants of Health     Financial Resource Strain: Not on file   Food Insecurity: Not on file   Transportation Needs: Not on file   Physical Activity: Not on file   Stress: Not on file   Social Connections: Not on file   Intimate Partner Violence: Not on file   Housing Stability: Not on file       Current Outpatient Medications:     bisoprolol-hydrochlorothiazide (ZIAC) 10-6.25 MG per tablet, Take 1 tablet by mouth daily, Disp: 90 tablet, Rfl: 3    cholecalciferol (VITAMIN D3) 1,000 units tablet, Take 1,000 Units by mouth daily, Disp: , Rfl:     Cyanocobalamin (VITAMIN B 12 PO), Take by mouth, Disp: , Rfl:     DULoxetine (CYMBALTA) 30 mg delayed release capsule, Take 1 capsule (30 mg total) by mouth daily, Disp: 90 capsule, Rfl: 3    DULoxetine (CYMBALTA) 60 mg delayed release capsule, Take 1 capsule (60 mg total) by mouth daily, Disp: 90 capsule, Rfl: 3    gabapentin (NEURONTIN) 400 mg capsule, Take 1 capsule (400 mg total) by mouth 3 (three) times a day, Disp: 300 capsule, Rfl: 1    Magnesium Carbonate, Antacid, (MAGNESIUM CARBONATE PO), Take by mouth, Disp: , Rfl:     zolpidem (AMBIEN) 5 mg tablet, Take 1 tablet (5 mg total) by mouth daily at bedtime as needed for  sleep, Disp: 30 tablet, Rfl: 0    Food Intake and Lifestyle Assessment   Food Intake Assessment completed via usual diet recall  Works at the [employer]  Wake:  5:15am  2 cups (16oz)of coffee at home  iced coffee with 42gm protein shake (Elite)--sips on until 10:30am  Breakfast: 8:30- scoop of eggs + 2-3 slices whipple OR waffles with syrup OR packs of oatmeal (2 packs)  Snack: drinking a lot fluids (water)   Lunch: skips 2 days per week OR Tuesday is pizza (1 slice) OR turkey wrap made at home OR pickled eggs with red beets  Snack: on the days skips lunch will grab skinny pop or bar bell protein bars (200 cals, 16-20gm pro) or special K bar  Dinner: potato (more sweet potatoes lately), veggie, grilled pork/chicken OR yesterday was chicken sausage,corn on cob, etc uses the air fryer a lot lately  Snack: this is a problem time--cookies, cake, dessert, ice cream. Lately hasn't been buying, and doing the skinny cow product or 100 belinda bars or chips--often grazes on different things    Beverage intake: water and coffee/tea, 2 beers on the weekend, trying vodka (lemon vodka with water)  Diet texture/stage: regular  Protein supplement: Elite Fairlife  Estimated protein intake per day: 60-80gm  Estimated fluid intake per day: 64oz at least   Meals eaten away from home: 2 times per week   Typical meal pattern: 2-3 meals per day and grazes on snacks per day  Eating Behaviors: Consumption of high calorie/ high fat foods, Consumption of high calorie beverages, Large portion sizes, Frequent snacking/ grazing, Mindless eating, Emotional eating, Craves sweet foods, and Craves salty foods    Food allergies or intolerances: regular ice cream, steak, rice  Cultural or Jainism considerations: -    Vitamins:  Barimelts calcium-1 TID, advised to double check to see if [id number]   Gave sample of BariPal  Started 5000IU vit d, advised 3000iu in multi so can do the 5000iu QOD  5000mcg liquid B12    Physical Assessment  Nutrition  "Related Findings  Dumping with regular ice cream     Physical Activity  Types of exercise: None, has a treadmill and vibration plate  Current physical limitations: -    Psychosocial Assessment   Support systems: spouse friend(s) relative(s)  Socioeconomic factors: lives with  and 2 dogs, then the other half of the house she rents out to [facility] and her now boyfriend this is very stressful.     Nutrition Diagnosis  Diagnosis: Overweight / Obesity (NC-3.3)  Related to: Physical inactivity and Excessive energy intake  As Evidenced by: BMI >25     Interventions and Teaching   Patient educated on post-op nutrition guidelines.       Patient educated and handouts provided.  Surgical changes to stomach / GI  Capacity of post-surgery stomach  Diet progression  Adequate hydration  Sugar and fat restriction to decrease \"dumping syndrome\"  Fat restriction to decrease steatorrhea  Expected weight loss  Weight loss plateaus/ possibility of weight regain  Exercise  Suggestions for pre-op diet  Nutrition considerations after surgery  Protein supplements  Meal planning and preparation  Appropriate carbohydrate, protein, and fat intake, and food/fluid choices to maximize safe weight loss, nutrient intake, and tolerance   Dietary and lifestyle changes  Possible problems with poor eating habits  Intuitive eating  Techniques for self monitoring and keeping daily food journal  Potential for food intolerance after surgery, and ways to deal with them including: lactose intolerance, nausea, reflux, vomiting, diarrhea, food intolerance, appetite changes, gas  Vitamin / Mineral supplementation of Multivitamin with minerals, Calcium, Vitamin B12, Iron, and Vitamin D    Education provided to: patient    Barriers to learning: No barriers identified    Readiness to change: preparation    Comprehension: verbalizes understanding     Expected Compliance: good    Pt is over 30 years s/p RYGB with weight regain, back to her start weight.  She " believes that she kept the weight off and was stable for the first 10 years and then slowly it started increasing and has had a lot of gain since her back sx about 2 years ago. Reviewed her diet recall and her restriction still sounds to be acceptable, but she appears to snack often and admits to emotional eating. Did some menu planning with pt suggesting 3 meals + 2 planned snacks per day.  Provided with snack list and reviewed post-op guidelines to get back on track.  Pt met with Dr Landers and Jennifer ordered.      Goals  Food journal via Baritastic   Exercise 30 minutes 5 times per week--start with walking as tolerated  Eat 3 meals per day + 1 planned snacks per day  Eliminate mindless snacking--limit to 2 planned snacks per day and choose from list provided.    Start bariatric vitamin provided  F/U with RD PRN     Time Spent:   30 Minutes

## 2024-08-12 NOTE — ASSESSMENT & PLAN NOTE
Patient has been referred to medical weight management for 140 pound weight regain status post open Yoanna-en-Y gastric bypass in 1993.  She has visited with the surgical dietitian today who has provided her the following nutrition prescription. [id number] average 1500 , 70-gm of protein  She is encouraged to track using Baritastic.  Patient reports her walking is unsteady as she has had back surgery and has some balance issues for which she underwent physical therapy for 6 months and could not continue as the insurance visits were not reimbursed.  Encouraged her to continue walking as tolerated.  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  She has a treadmill at home and wants to get into a routine.  Recommended to 15-minute walks 2 times a day.  STOP-BANG 4/8; 7+ hours sleep she uses magnesium glycinate and Ambien-could consider home sleep study.  Given weight loss goals we will recommend Zepbound 2.5 mg.  Prescription has been submitted.  Discussed oral medication options which could be used in combination therapy for slower weight loss or weight loss plateaus.  Metformin would be first choice given synergistic mechanism of action.  Patient is currently on Cymbalta 90 mg so would not want to use Wellbutrin which is in the same drug class.  Could consider adding naltrexone to Cymbalta.  Phentermine would be a last option given its stimulant properties.  No contraindications to Topamax.

## 2024-08-12 NOTE — PROGRESS NOTES
Assessment/Plan     Silvia Antonio  is a 64 y.o. female  referred  by bariatric surgery team on -- to Medical Weight Management  for treatment of post-op weight regain.     Brief Summary: From chart review   -s/p OPEN Yoanna-En-Y Gastric Bypass in 1993 at [facility].   Initial: 311lbs  Current: 310.2lbs  Cheikh: 170lbs maintained cheikh weight for about 10 years  Weight regain:+140 lbs  Target Weight :170 lbs  BMI 55 kg/m2 - Above 40- Obesity Class III    Class 3 severe obesity due to excess calories with serious comorbidity and body mass index (BMI) of 50.0 to 59.9 in adult ([facility])  Patient has been referred to medical weight management for 140 pound weight regain status post open Yoanna-en-Y gastric bypass in 1993.  She has visited with the surgical dietitian today who has provided her the following nutrition prescription. [id number] average 1500 , 70-gm of protein  She is encouraged to track using Baritastic.  Patient reports her walking is unsteady as she has had back surgery and has some balance issues for which she underwent physical therapy for 6 months and could not continue as the insurance visits were not reimbursed.  Encouraged her to continue walking as tolerated.  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  She has a treadmill at home and wants to get into a routine.  Recommended to 15-minute walks 2 times a day.  STOP-BANG 4/8; 7+ hours sleep she uses magnesium glycinate and Ambien-could consider home sleep study.  Given weight loss goals we will recommend Zepbound 2.5 mg.  Prescription has been submitted.  Discussed oral medication options which could be used in combination therapy for slower weight loss or weight loss plateaus.  Metformin would be first choice given synergistic mechanism of action.  Patient is currently on Cymbalta 90 mg so would not want to use Wellbutrin which is in the same drug class.  Could consider adding naltrexone to Cymbalta.   Phentermine would be a last option given its stimulant properties.  No contraindications to Topamax.  -Patient has a heart rate of 50 likely should get bisoprolol adjusted with PCP.  -Also discussed weight gaining potential of Neurontin  -Discussed with patient multiple etiology of obesity such as genetics excess calories physical inactivity menopause weight promoting medication such as Neurontin and potential for sleep apnea undiagnosed.        -Also counseled that weight regain may occur on stopping medication and it therefore may need to be continued as a weight maintenance medication long term if well tolerated. Patient informed to consider all the  factors outlined below before making an informed decision regarding initiating anti obesity pharmacotherapy.   -Counseled the patient that all AOM's are contraindicated in pregnancy and lactation and should be discontinued if patient were to become pregnant.  -In addition, please follow general recommendations below.          - Discussed at length and the role of weight loss medications and medication options   - Explained the importance of making lifestyle changes in addition to starting any anti-obesity medications if the  patient chooses.  -  Initial weight loss goal of 5-10% weight loss for improved health  - Weight loss can improve patient's co-morbid conditions and/or prevent weight-related complications.  - Weight is not at goal and patient has been unable to achieve a meaningful weight loss above 5% using various programs and tools for more than 6 months      General Lifestyle recommendations:    Nutrition   Do not skip meals. Avoid sugary beverages. At least 64oz of water daily. Counseled the patient on healthy eating with My plate method for macronutrient balance. Informed patient of the importance of focusing on protein goals and non starchy fiber rich vegetables for satiety effect and to help support a calorie deficit.  Limit processed food, refined  "sugars and grain.  Behavioral/Stress   Food log via misty or provided paper log (misty options include www.Better Financepal.com, sparkpeople.com, loseit.com, calorieking.com, [employer]). Encouraged mindful eating. Be sure to set aside time to eat, eat slowly, and savor your food. Consider meditation apps and/or taking a few minutes of mindfulness every AM. Weigh daily or atleast 2-3 times/ week  Physical Activity   Increase physical activity by 10 minutes daily. Gradually increase physical activity to a goal of 5 days per week for 30 minutes of MODERATE intensity ( should be able to pass the \"talk test\" but should not be able to sing. Target 150-300 minutes  PLUS 2 days per week of FULL BODY resistance training. Progression will be addressed at follow up visits. Encouraged contemplation regarding establishing a daily physical activity routine  Sleep   Encourage sleep hygiene and importance of having adequate sleep duration at least > 6 hours to support response in weight loss efforts    Handouts provided and reviewed:  THRIVE program at [facility]  MyPlate and food quality  Calorie goal and sample menu  Food log resources, phone misty or paper journal  Antiobesity medications options         Silvia was seen today for follow-up.    Diagnoses and all orders for this visit:    Class 3 severe obesity due to excess calories with serious comorbidity and body mass index (BMI) of 50.0 to 59.9 in adult ([facility])  -     tirzepatide (Zepbound) 2.5 mg/0.5 mL auto-injector; Inject 0.5 mL (2.5 mg total) under the skin once a week    Postsurgical malabsorption            Zepbound Instructions:    - Begin Zepbound 2.5 mg subcutaneously once a week. Dose changes may occur after 4 doses if medication is tolerated. You will be assessed prior to each dose change to make sure you are tolerating the medication well.  - Please message me when you have 2 pens left from the prescription so there are no lapses in treatment.  - Visit Zepbound.com for " further information/injection instructions.   - Take precautions to avoid dehydration. Aim for 64-80 oz of fluids/day  -Stop eating before you feel full  -Instructed  to administer a missed dose as soon as possible within 4 days. If more than 4 days have passed, skip the missed dose, administer the next dose on the regularly scheduled day, and resume the regular once-weekly dosing schedule  -Please eat small frequent meals to help reduce nausea. Avoid excessively fatty meals fried foods. Lemon water and saltine crackers may help with this.   - Side effects of Zepbound discussed: nausea, vomiting, diarrhea, and constipation. If you experience fever, nausea/vomiting, and pain radiating to your back this may be a sign of pancreatitis. Please go to the emergency room if this occurs.  - Consider OTC bowel regimen including stool softeners, fiber supplements to regularize bowel movements while on medication. MiraLAX can be used if needed  - - If on oral birth control a 2nd method of birth control is recommended during the 1st 8 weeks of therapy and for 4 weeks after any dosage change.   - Patient understands the side effects of the medication and proper administration. Patient agrees with the treatment plan and all questions were answered.    Subcutaneous injection:  Inject subQ into the thigh, abdomen, or upper arm; rotate injection sites.  Administer at any time of day, with or without meals.  Administer separately from insulin (do not mix the products); may inject both medications in the same body region but not adjacent to each other.  The day of the weekly administration can be changed as long as the time between the 2 doses is at least 3 days (72 hours)  Administer a missed dose as soon as possible within 4 days (96 hours) of missed dose; if more than 4 days have passed, skip the missed dose and administer next dose on regularly scheduled day and resume regular once weekly dosing schedule           Total time spent  reviewing chart, interviewing patient, examining patient, discussing plan, answering all questions, and documentin min, with >50% face-to-face time spent counseling patient on nonsurgical interventions for the treatment of excess weight. Discussed in detail nonsurgical options including intensive lifestyle intervention program, very low-calorie diet program and conservative program.  Discussed the role of weight loss medications.  Counseled patient on diet behavior and exercise modification for weight loss.                HPI     Wt Readings from Last 20 Encounters:   24 (!) 140 kg (309 lb)   24 (!) 140 kg (309 lb)   24 (!) 140 kg (309 lb)   24 (!) 141 kg (310 lb 3.2 oz)   24 (!) 141 kg (310 lb 3.2 oz)   24 (!) 143 kg (315 lb)   24 (!) 140 kg (308 lb)   24 (!) 142 kg (313 lb)   24 (!) 142 kg (313 lb)   24 (!) 142 kg (313 lb)   24 136 kg (300 lb)   24 136 kg (300 lb)   24 136 kg (300 lb)   23 (!) 138 kg (304 lb)   23 (!) 140 kg (308 lb)   10/19/22 132 kg (292 lb)   22 129 kg (285 lb)   22 133 kg (293 lb)   22 133 kg (293 lb)   22 133 kg (293 lb)           Lifestyle questionnaire     From chart review,   Met with surgical RD today [id number] average 1500 , 70-gm of protein   Diet recall:  B - 32oz iced coffee  Snack - varies - special K bar or oatmeal or yogurt or cookies  L - skips   D - chicken or sausage or pork and fried potatoes or corn on cob or baked potato  HS - sweets and cookies or cookies or chips  Eating out vs cooking at home-1-2 times a week    Beverages  Water-- 64    Caffeine/tea--32     SSB- no    Alcohol: stopped beer  Smoking: no  Drug use: no    Physical Activity --cleaning at home, walking in the hospital. Walking is unsteady she has back surgery. Has treadmill at home , wants to get into a routine.   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   Psycho social- Lives with her  - very high stress with him. Has 2 [employer] (male and female).         Food behaviors\"head\" hunger/cravings, stress/emotional eating, boredom eating, and snacking/grazing- sweet cravings in the evening   Gyneac (Menopausal status/periods/contraception)- MP  Previous Weight loss medications/Weight loss attempts:       Colonoscopy: UTD  Mammogram: UTD      Objective         The following portions of the patient's history were reviewed and updated as appropriate: allergies, current medications, past family history, past medical history, past social history, past surgical history, and problem list.      /80   Pulse (!) 53   Ht 5' 2.75\" (1.594 m)   Wt (!) 140 kg (309 lb)   BMI 55.17 kg/m²         Review of Systems   Constitutional:  Negative for fatigue.   HENT:  Negative for sore throat.    Respiratory:  Negative for cough and shortness of breath.    Cardiovascular:  Negative for chest pain, palpitations and leg swelling.   Gastrointestinal:  Negative for abdominal pain, constipation, diarrhea and nausea.   Genitourinary:  Negative for dysuria.   Musculoskeletal:  Negative for arthralgias and back pain.   Skin:  Negative for rash.   Neurological:  Negative for headaches.   Psychiatric/Behavioral:  Negative for dysphoric mood. The patient is not nervous/anxious.        Physical Exam  Vitals and nursing note reviewed.   Constitutional:       Appearance: Normal appearance.   HENT:      Head: Normocephalic.   Neck:      Thyroid: No thyroid mass, thyromegaly or thyroid tenderness.   Cardiovascular:      Rate and Rhythm: Normal rate and regular rhythm.      Pulses: Normal pulses.      Heart sounds: Normal heart sounds.   Pulmonary:      Effort: Pulmonary effort is normal.      Breath sounds: Normal breath sounds.   Abdominal:      General: Bowel sounds are normal.      Palpations: Abdomen is soft.   Musculoskeletal:      Cervical back: Normal range of " motion and neck supple. No tenderness.      Right lower leg: No edema.      Left lower leg: No edema.   Skin:     General: Skin is warm and dry.   Neurological:      General: No focal deficit present.      Mental Status: She is alert and oriented to person, place, and time.   Psychiatric:         Mood and Affect: Mood normal.         Behavior: Behavior normal.         Thought Content: Thought content normal.         Judgment: Judgment normal.            Current meds       Current Outpatient Medications:     bisoprolol-hydrochlorothiazide (ZIAC) 10-6.25 MG per tablet, Take 1 tablet by mouth daily, Disp: 90 tablet, Rfl: 3    Cholecalciferol (Vitamin D3) 125 MCG (5000 UT) CHEW, Chew 1,000 Units daily, Disp: , Rfl:     Cyanocobalamin (VITAMIN B 12 PO), Take by mouth, Disp: , Rfl:     DULoxetine (CYMBALTA) 30 mg delayed release capsule, Take 1 capsule (30 mg total) by mouth daily, Disp: 90 capsule, Rfl: 3    DULoxetine (CYMBALTA) 60 mg delayed release capsule, Take 1 capsule (60 mg total) by mouth daily, Disp: 90 capsule, Rfl: 3    gabapentin (NEURONTIN) 400 mg capsule, Take 1 capsule (400 mg total) by mouth 3 (three) times a day, Disp: 300 capsule, Rfl: 1    MAGNESIUM GLYCINATE PO, Take 1 tablet by mouth in the morning 500 MG, Disp: , Rfl:     tirzepatide (Zepbound) 2.5 mg/0.5 mL auto-injector, Inject 0.5 mL (2.5 mg total) under the skin once a week, Disp: 2 mL, Rfl: 0    zolpidem (AMBIEN) 5 mg tablet, Take 1 tablet (5 mg total) by mouth daily at bedtime as needed for sleep, Disp: 30 tablet, Rfl: 0    Magnesium Carbonate, Antacid, (MAGNESIUM CARBONATE PO), Take by mouth (Patient not taking: Reported on 8/12/2024), Disp: , Rfl:          Medication considerations/contraindications     -Patient denies personal history of pancreatitis. Patient also denies personal and family history of medullary thyroid cancer and multiple endocrine neoplasia type 2 (MEN 2 tumor). -Patient denies any history of kidney stones, seizures, or  glaucoma, diabetic retinopathy, gall bladder disease, hyperthyroidism.  -Denies Hx of CAD, PAD, palpitations, arrhythmia.   -Denies uncontrolled anxiety or depression, suicidal behavior or thinking , insomnia or sleep disturbance.         Labs     Most recent labs reviewed   Lab Results   Component Value Date    SODIUM 139 07/13/2024    K 4.2 07/13/2024     07/13/2024    CO2 29 07/13/2024    AGAP 8 07/13/2024    BUN 15 07/13/2024    CREATININE 0.87 07/13/2024    GLUC 121 (H) 04/18/2022    GLUF 97 07/13/2024    CALCIUM 9.6 07/13/2024    AST 16 07/13/2024    ALT 11 07/13/2024    ALKPHOS 76 07/13/2024    TP 7.5 07/13/2024    TBILI 0.61 07/13/2024    EGFR 71 07/13/2024     Lab Results   Component Value Date    HGBA1C 5.5 07/13/2024     Lab Results   Component Value Date    ISJ8LMVMFSLO 3.149 07/13/2024     Lab Results   Component Value Date    CHOLESTEROL 172 07/13/2024     Lab Results   Component Value Date    HDL 45 (L) 07/13/2024     Lab Results   Component Value Date    TRIG 123 07/13/2024     Lab Results   Component Value Date    LDLCALC 102 (H) 07/13/2024

## 2024-08-19 ENCOUNTER — TELEPHONE (OUTPATIENT)
Age: 64
End: 2024-08-19

## 2024-08-19 NOTE — TELEPHONE ENCOUNTER
Patient would like her Prior-Auth for medication Zepbound be submitted. [facility] told patient she needs a Prior-Auth for medication.

## 2024-08-20 ENCOUNTER — TELEPHONE (OUTPATIENT)
Dept: BARIATRICS | Facility: CLINIC | Age: 64
End: 2024-08-20

## 2024-08-20 NOTE — TELEPHONE ENCOUNTER
VICKIE TORIBIO (Key: [id number]) - [id number]  Zepbound 2.5MG/0.5ML pen-injectors  status: PA Response - ApprovedCreated: August 12th, 2024 [id number]: August 20th, 2024

## 2024-08-20 NOTE — TELEPHONE ENCOUNTER
VICKIE TORIBIO (Key: [id number]) - [id number]  Zepbound 2.5MG/0.5ML pen-injectors  status: PA RequestCreated: August 12th, 2024 [id number]: August 20th, 2024

## 2024-08-23 ENCOUNTER — HOSPITAL ENCOUNTER (OUTPATIENT)
Dept: VASCULAR ULTRASOUND | Facility: HOSPITAL | Age: 64
Discharge: HOME | End: 2024-08-23
Attending: FAMILY MEDICINE
Payer: COMMERCIAL

## 2024-08-23 DIAGNOSIS — R09.89 DECREASED DORSALIS PEDIS PULSE: ICD-10-CM

## 2024-08-23 PROCEDURE — 93925 LOWER EXTREMITY STUDY: CPT | Performed by: SURGERY

## 2024-08-23 PROCEDURE — 93923 UPR/LXTR ART STDY 3+ LVLS: CPT

## 2024-08-23 PROCEDURE — 93922 UPR/L XTREMITY ART 2 LEVELS: CPT | Performed by: SURGERY

## 2024-08-23 PROCEDURE — 93925 LOWER EXTREMITY STUDY: CPT

## 2024-09-04 ENCOUNTER — TELEPHONE (OUTPATIENT)
Dept: FAMILY MEDICINE CLINIC | Facility: CLINIC | Age: 64
End: 2024-09-04

## 2024-09-04 NOTE — TELEPHONE ENCOUNTER
It was just a recommendation by the vascular tech because they checked both arms  and they found BP different if she  doesn't want to  do it  it is ok

## 2024-09-04 NOTE — TELEPHONE ENCOUNTER
----- Message from Flaquita Bourne MD sent at 8/29/2024  1:43 PM EDT -----  Mild disease  right and mild to moderate disease left  (atherosclerosis) keep exercising  which helps the blood flow if gets pain with walking needs to see vascular   Vascular lab states blood pressure was different on each arm so they want to check for  atherosclerosis in arm

## 2024-09-04 NOTE — TELEPHONE ENCOUNTER
Silvia is still very confused (regarding the messages she's receiving) because her BP was always completely normal in our office.  She doesn't know where this is coming from & was never told anything different about her BP.  She said she doesn't want to keep going for tests if there's a different issue going on.

## 2024-09-09 DIAGNOSIS — F51.01 PRIMARY INSOMNIA: ICD-10-CM

## 2024-09-09 RX ORDER — ZOLPIDEM TARTRATE 5 MG/1
5 TABLET ORAL
Qty: 30 TABLET | Refills: 0 | Status: SHIPPED | OUTPATIENT
Start: 2024-09-09

## 2024-09-13 DIAGNOSIS — E66.01 CLASS 3 SEVERE OBESITY DUE TO EXCESS CALORIES WITH SERIOUS COMORBIDITY AND BODY MASS INDEX (BMI) OF 50.0 TO 59.9 IN ADULT (HCC): Primary | ICD-10-CM

## 2024-09-13 RX ORDER — TIRZEPATIDE 5 MG/.5ML
5 INJECTION, SOLUTION SUBCUTANEOUS WEEKLY
Qty: 2 ML | Refills: 1 | Status: SHIPPED | OUTPATIENT
Start: 2024-09-13 | End: 2024-09-16 | Stop reason: SDUPTHER

## 2024-09-16 DIAGNOSIS — E66.01 CLASS 3 SEVERE OBESITY DUE TO EXCESS CALORIES WITH SERIOUS COMORBIDITY AND BODY MASS INDEX (BMI) OF 50.0 TO 59.9 IN ADULT (HCC): ICD-10-CM

## 2024-09-17 RX ORDER — TIRZEPATIDE 5 MG/.5ML
5 INJECTION, SOLUTION SUBCUTANEOUS WEEKLY
Qty: 2 ML | Refills: 1 | Status: SHIPPED | OUTPATIENT
Start: 2024-09-17 | End: 2024-11-12

## 2024-10-03 DIAGNOSIS — M79.7 FIBROMYALGIA, PRIMARY: ICD-10-CM

## 2024-10-03 DIAGNOSIS — I10 ESSENTIAL HYPERTENSION: ICD-10-CM

## 2024-10-03 DIAGNOSIS — F41.9 ANXIETY: ICD-10-CM

## 2024-10-03 RX ORDER — DULOXETIN HYDROCHLORIDE 60 MG/1
60 CAPSULE, DELAYED RELEASE ORAL DAILY
Qty: 90 CAPSULE | Refills: 0 | Status: SHIPPED | OUTPATIENT
Start: 2024-10-03

## 2024-10-03 RX ORDER — BISOPROLOL FUMARATE AND HYDROCHLOROTHIAZIDE 10; 6.25 MG/1; MG/1
1 TABLET ORAL DAILY
Qty: 90 TABLET | Refills: 0 | Status: SHIPPED | OUTPATIENT
Start: 2024-10-03

## 2024-10-03 RX ORDER — DULOXETIN HYDROCHLORIDE 30 MG/1
30 CAPSULE, DELAYED RELEASE ORAL DAILY
Qty: 90 CAPSULE | Refills: 0 | Status: SHIPPED | OUTPATIENT
Start: 2024-10-03

## 2024-10-09 DIAGNOSIS — E66.01 CLASS 3 SEVERE OBESITY DUE TO EXCESS CALORIES WITH SERIOUS COMORBIDITY AND BODY MASS INDEX (BMI) OF 50.0 TO 59.9 IN ADULT (HCC): ICD-10-CM

## 2024-10-09 DIAGNOSIS — E66.813 CLASS 3 SEVERE OBESITY DUE TO EXCESS CALORIES WITH SERIOUS COMORBIDITY AND BODY MASS INDEX (BMI) OF 50.0 TO 59.9 IN ADULT (HCC): ICD-10-CM

## 2024-10-10 RX ORDER — TIRZEPATIDE 5 MG/.5ML
5 INJECTION, SOLUTION SUBCUTANEOUS WEEKLY
Qty: 2 ML | Refills: 0 | Status: SHIPPED | OUTPATIENT
Start: 2024-10-10 | End: 2024-12-05

## 2024-10-16 DIAGNOSIS — M79.7 FIBROMYALGIA, PRIMARY: ICD-10-CM

## 2024-10-16 DIAGNOSIS — F51.01 PRIMARY INSOMNIA: ICD-10-CM

## 2024-10-16 RX ORDER — ZOLPIDEM TARTRATE 5 MG/1
5 TABLET ORAL
Qty: 30 TABLET | Refills: 0 | Status: SHIPPED | OUTPATIENT
Start: 2024-10-16

## 2024-10-16 RX ORDER — GABAPENTIN 400 MG/1
400 CAPSULE ORAL 3 TIMES DAILY
Qty: 300 CAPSULE | Refills: 0 | Status: SHIPPED | OUTPATIENT
Start: 2024-10-16

## 2024-10-28 ENCOUNTER — HOSPITAL ENCOUNTER (OUTPATIENT)
Dept: RADIOLOGY | Age: 64
Discharge: HOME | End: 2024-10-28
Payer: COMMERCIAL

## 2024-10-28 VITALS — BODY MASS INDEX: 49.96 KG/M2 | HEIGHT: 63 IN | WEIGHT: 282 LBS

## 2024-10-28 DIAGNOSIS — Z12.31 ENCOUNTER FOR SCREENING MAMMOGRAM FOR MALIGNANT NEOPLASM OF BREAST: ICD-10-CM

## 2024-10-28 PROCEDURE — 77063 BREAST TOMOSYNTHESIS BI: CPT

## 2024-10-28 PROCEDURE — 77067 SCR MAMMO BI INCL CAD: CPT

## 2024-10-31 ENCOUNTER — APPOINTMENT (OUTPATIENT)
Dept: LAB | Facility: HOSPITAL | Age: 64
End: 2024-10-31
Attending: PATHOLOGY

## 2024-10-31 DIAGNOSIS — Z00.6 ENCOUNTER FOR EXAMINATION FOR NORMAL COMPARISON OR CONTROL IN CLINICAL RESEARCH PROGRAM: ICD-10-CM

## 2024-10-31 PROCEDURE — 36415 COLL VENOUS BLD VENIPUNCTURE: CPT

## 2024-11-11 DIAGNOSIS — E66.01 CLASS 3 SEVERE OBESITY DUE TO EXCESS CALORIES WITH SERIOUS COMORBIDITY AND BODY MASS INDEX (BMI) OF 50.0 TO 59.9 IN ADULT (HCC): Primary | ICD-10-CM

## 2024-11-11 DIAGNOSIS — E66.813 CLASS 3 SEVERE OBESITY DUE TO EXCESS CALORIES WITH SERIOUS COMORBIDITY AND BODY MASS INDEX (BMI) OF 50.0 TO 59.9 IN ADULT (HCC): Primary | ICD-10-CM

## 2024-11-11 RX ORDER — TIRZEPATIDE 7.5 MG/.5ML
7.5 INJECTION, SOLUTION SUBCUTANEOUS WEEKLY
Qty: 2 ML | Refills: 2 | Status: SHIPPED | OUTPATIENT
Start: 2024-11-11 | End: 2024-11-12 | Stop reason: SDUPTHER

## 2024-11-12 ENCOUNTER — TELEPHONE (OUTPATIENT)
Dept: BARIATRICS | Facility: CLINIC | Age: 64
End: 2024-11-12

## 2024-11-12 DIAGNOSIS — E66.01 CLASS 3 SEVERE OBESITY DUE TO EXCESS CALORIES WITH SERIOUS COMORBIDITY AND BODY MASS INDEX (BMI) OF 50.0 TO 59.9 IN ADULT (HCC): ICD-10-CM

## 2024-11-12 DIAGNOSIS — E66.813 CLASS 3 SEVERE OBESITY DUE TO EXCESS CALORIES WITH SERIOUS COMORBIDITY AND BODY MASS INDEX (BMI) OF 50.0 TO 59.9 IN ADULT (HCC): ICD-10-CM

## 2024-11-12 RX ORDER — TIRZEPATIDE 7.5 MG/.5ML
7.5 INJECTION, SOLUTION SUBCUTANEOUS WEEKLY
Qty: 2 ML | Refills: 2 | Status: SHIPPED | OUTPATIENT
Start: 2024-11-12 | End: 2025-02-04

## 2024-11-13 LAB
APOB+LDLR+PCSK9 GENE MUT ANL BLD/T: NOT DETECTED
BRCA1+BRCA2 DEL+DUP + FULL MUT ANL BLD/T: NOT DETECTED
MLH1+MSH2+MSH6+PMS2 GN DEL+DUP+FUL M: NOT DETECTED

## 2024-11-16 ENCOUNTER — APPOINTMENT (OUTPATIENT)
Dept: LAB | Facility: HOSPITAL | Age: 64
End: 2024-11-16
Payer: COMMERCIAL

## 2024-11-16 DIAGNOSIS — R79.89 HIGH SERUM PARATHYROID HORMONE (PTH): ICD-10-CM

## 2024-11-16 DIAGNOSIS — K91.2 POSTSURGICAL MALABSORPTION: ICD-10-CM

## 2024-11-16 LAB
25(OH)D3 SERPL-MCNC: 62.5 NG/ML (ref 30–100)
PTH-INTACT SERPL-MCNC: 90 PG/ML (ref 12–88)

## 2024-11-16 PROCEDURE — 82306 VITAMIN D 25 HYDROXY: CPT

## 2024-11-16 PROCEDURE — 83970 ASSAY OF PARATHORMONE: CPT

## 2024-11-16 PROCEDURE — 36415 COLL VENOUS BLD VENIPUNCTURE: CPT

## 2024-11-18 ENCOUNTER — OFFICE VISIT (OUTPATIENT)
Dept: BARIATRICS | Facility: CLINIC | Age: 64
End: 2024-11-18
Payer: COMMERCIAL

## 2024-11-18 VITALS
DIASTOLIC BLOOD PRESSURE: 80 MMHG | HEART RATE: 86 BPM | HEIGHT: 63 IN | OXYGEN SATURATION: 98 % | WEIGHT: 277.6 LBS | SYSTOLIC BLOOD PRESSURE: 138 MMHG | BODY MASS INDEX: 49.19 KG/M2

## 2024-11-18 DIAGNOSIS — E66.01 CLASS 3 SEVERE OBESITY DUE TO EXCESS CALORIES WITH SERIOUS COMORBIDITY AND BODY MASS INDEX (BMI) OF 45.0 TO 49.9 IN ADULT (HCC): Primary | ICD-10-CM

## 2024-11-18 DIAGNOSIS — E66.813 CLASS 3 SEVERE OBESITY DUE TO EXCESS CALORIES WITH SERIOUS COMORBIDITY AND BODY MASS INDEX (BMI) OF 45.0 TO 49.9 IN ADULT (HCC): Primary | ICD-10-CM

## 2024-11-18 PROCEDURE — 99215 OFFICE O/P EST HI 40 MIN: CPT | Performed by: INTERNAL MEDICINE

## 2024-11-18 RX ORDER — TIRZEPATIDE 5 MG/.5ML
5 INJECTION, SOLUTION SUBCUTANEOUS WEEKLY
Qty: 2 ML | Refills: 1 | Status: SHIPPED | OUTPATIENT
Start: 2024-11-18 | End: 2025-01-13

## 2024-11-18 NOTE — ASSESSMENT & PLAN NOTE
Patient reports that she is doing really well on the Zepbound.  She has finished a month of the 2.5 mg and has finished 2 months of the 5 mg.  A prescription for the 7.5 mg was sent in however she was not able to find the medication as it is on backorder.  She would like to stay on the 5 mg for that reason.  A prescription has been resubmitted along with a refill.  Congratulated patient on losing 33 pounds on Zepbound along with lifestyle changes-roughly 2.5 lbs / week.  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  Patient states that her  has a new [employer] at home.  Encouraged her to incorporate 2 to 3 days of strength training.  She has been tracking using Baritastic.  She reports that she may not be getting enough calories and is getting around 1200 belinda or less although she always is able to hit her protein targets.  I discussed that it is perhaps beneficial that we are continuing the 5 mg dose in order to ensure adequate nutrition.  She has been meal prepping.  She has stopped snacking in the evening and her cravings have completely disappeared.  Alcohol has also considerably decreased.  STOP- BANG-4/8;   7 hours

## 2024-11-18 NOTE — PROGRESS NOTES
Program: [facility] Program    Assessment/Plan     Silvia Antonio  is a 64 y.o. female with  returns to follow up  for treatment of excess body weight and associated risk factors/co-morbidities.     Brief Summary: From chart review   -s/p OPEN Yoanna-En-Y Gastric Bypass in 1993 at [facility].     Initial: 311lbs  Weight on 8/12/2024: 310.2lbs  Cheikh: 170lbs maintained cheikh weight for about 10 years  Weight regain:+140 lbs  Target Weight :170 lbs  BMI 55 kg/m2 - Above 40- Obesity Class III      Weight on 11/18/2024: 277 lbs  BMI on 11/18/2024: 49 kg/m2 Above 40- Obesity Class III  Difference: -33 lbs    Class 3 severe obesity due to excess calories with serious comorbidity and body mass index (BMI) of 45.0 to 49.9 in adult (HCC)  Patient reports that she is doing really well on the Zepbound.  She has finished a month of the 2.5 mg and has finished 2 months of the 5 mg.  A prescription for the 7.5 mg was sent in however she was not able to find the medication as it is on backorder.  She would like to stay on the 5 mg for that reason.  A prescription has been resubmitted along with a refill.  Congratulated patient on losing 33 pounds on Zepbound along with lifestyle changes-roughly 2.5 lbs / week.  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  Patient states that her  has a new Total Gym at home.  Encouraged her to incorporate 2 to 3 days of strength training.  She has been tracking using Baritastic.  She reports that she may not be getting enough calories and is getting around 1200 belinda or less although she always is able to hit her protein targets.  I discussed that it is perhaps beneficial that we are continuing the 5 mg dose in order to ensure adequate nutrition.  She has been meal prepping.  She has stopped snacking in the evening and her cravings have completely disappeared.  Alcohol has also considerably  "decreased.  STOP- BANG-;   7 hours         Most recent notes and records were reviewed.         Return visit:  2-3 months     Nutrition   Do not skip meals. Avoid sugary beverages. At least 64oz of water daily.    Behavioral/Stress   Food log via misty or provided paper log (misty options include www.[email or web address], sparkpeople.com, loseit.com, calorieking.com, [employer]). Encouraged mindful eating    Physical Activity  Increase physical activity by 10 minutes daily. Gradually increase physical activity to a goal of 5 days per week for 30 minutes of MODERATE intensity ( ( should be able to pass the \"talk test\" but should not be able to sing.  target 150-300 minutes  PLUS 2-3 days per week of FULL BODY resistance training. Progression will be addressed at follow up visits. Encouraged planning regarding establishing physical activity routine    Sleep  Provided sleep hygiene counseling and importance of having adequate sleep duration          Silvia was seen today for follow-up.    Diagnoses and all orders for this visit:    Class 3 severe obesity due to excess calories with serious comorbidity and body mass index (BMI) of 45.0 to 49.9 in adult (HCC)  -     tirzepatide (Zepbound) 5 mg/0.5 mL auto-injector; Inject 0.5 mL (5 mg total) under the skin once a week                Total time spent reviewing chart, interviewing patient, examining patient, discussing plan, answering all questions, and documentin minutes with >50% face-to-face time with the patient.            Weight trajectory     Wt Readings from Last 20 Encounters:   24 126 kg (277 lb 9.6 oz)   10/28/24 128 kg (282 lb)   24 (!) 140 kg (309 lb)   24 (!) 140 kg (309 lb)   24 (!) 140 kg (309 lb)   24 (!) 141 kg (310 lb 3.2 oz)   24 (!) 141 kg (310 lb 3.2 oz)   24 (!) 143 kg (315 lb)   24 (!) 140 kg (308 lb)   24 (!) 142 kg (313 lb)   24 (!) 142 kg (313 lb)   24 (!) 142 kg (313 lb) " "  02/14/24 136 kg (300 lb)   01/31/24 136 kg (300 lb)   01/12/24 136 kg (300 lb)   08/09/23 (!) 138 kg (304 lb)   06/30/23 (!) 140 kg (308 lb)   10/19/22 132 kg (292 lb)   03/14/22 129 kg (285 lb)   03/07/22 133 kg (293 lb)           Lifestyle questionnaire     Met with surgical RD today [id number] average 1500 , 70-gm of protein   Diet recall:  B - 32oz iced coffee with protein shake scrambled eggs whipple sometimes yogurt or oatmeal  Snack - varies - special K bar or oatmeal or yogurt or cookies  L -turkey chicken cottage  cheese bowls  D - chicken or sausage or pork sweet potato salad or vegetable occasional pizza  HS - stopped snacking   Eating out vs cooking at home-1-2 times a week    Beverages  Water-- 64    Caffeine/tea--32     SSB- no    Alcohol: stopped beer  Smoking: no  Drug use: no    Physical Activity --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 Walking is unsteady she has back surgery. Has treadmill at home , wants to get into a routine. Total gym in garage   Sleep -- STOP- BANG-4/8;   7 hours    Occupation-Works in [employer].   Psycho social- Lives with her  - very high stress with him. Has 2 Urdu Wire Pointers (male and female).         Anti obesity Medications/ Medical---    Weight loss medication and dose: Zepbound  Date initiated: August 2024        Colonoscopy: UTD  Mammogram: UTD                        Objective         The following portions of the patient's history were reviewed and updated as appropriate: allergies, current medications, past family history, past medical history, past social history, past surgical history, and problem list.      /80 (BP Location: Right arm, Patient Position: Sitting, Cuff Size: Large)   Pulse 86   Ht 5' 3\" (1.6 m)   Wt 126 kg (277 lb 9.6 oz)   SpO2 98%   BMI 49.17 kg/m²             Review of Systems   Constitutional:  Negative for fatigue.   HENT:  Negative for " sore throat.    Respiratory:  Negative for cough and shortness of breath.    Cardiovascular:  Negative for chest pain, palpitations and leg swelling.   Gastrointestinal:  Negative for abdominal pain, constipation, diarrhea and nausea.   Genitourinary:  Negative for dysuria.   Musculoskeletal:  Negative for arthralgias and back pain.   Skin:  Negative for rash.   Neurological:  Negative for headaches.   Psychiatric/Behavioral:  Negative for dysphoric mood. The patient is not nervous/anxious.          Physical Exam  Vitals and nursing note reviewed.   Constitutional:       Appearance: Normal appearance.   HENT:      Head: Normocephalic.   Pulmonary:      Effort: Pulmonary effort is normal.   Neurological:      General: No focal deficit present.      Mental Status: She is alert and oriented to person, place, and time.   Psychiatric:         Mood and Affect: Mood normal.         Behavior: Behavior normal.         Thought Content: Thought content normal.         Judgment: Judgment normal.          Current medications       Current Outpatient Medications:     bisoprolol-hydrochlorothiazide (ZIAC) 10-6.25 MG per tablet, Take 1 tablet by mouth daily, Disp: 90 tablet, Rfl: 0    Cholecalciferol (Vitamin D3) 125 MCG (5000 UT) CHEW, Chew 1,000 Units daily, Disp: , Rfl:     Cyanocobalamin (VITAMIN B 12 PO), Take by mouth, Disp: , Rfl:     DULoxetine (CYMBALTA) 30 mg delayed release capsule, Take 1 capsule (30 mg total) by mouth daily, Disp: 90 capsule, Rfl: 0    DULoxetine (CYMBALTA) 60 mg delayed release capsule, Take 1 capsule (60 mg total) by mouth daily, Disp: 90 capsule, Rfl: 0    gabapentin (NEURONTIN) 400 mg capsule, Take 1 capsule (400 mg total) by mouth 3 (three) times a day, Disp: 300 capsule, Rfl: 0    MAGNESIUM GLYCINATE PO, Take 1 tablet by mouth in the morning 500 MG, Disp: , Rfl:     tirzepatide (Zepbound) 5 mg/0.5 mL auto-injector, Inject 0.5 mL (5 mg total) under the skin once a week, Disp: 2 mL, Rfl: 1     "tirzepatide (Zepbound) 7.5 mg/0.5 mL auto-injector, Inject 0.5 mL (7.5 mg total) under the skin once a week, Disp: 2 mL, Rfl: 2    zolpidem (AMBIEN) 5 mg tablet, Take 1 tablet (5 mg total) by mouth daily at bedtime as needed for sleep, Disp: 30 tablet, Rfl: 0    Magnesium Carbonate, Antacid, (MAGNESIUM CARBONATE PO), Take by mouth (Patient not taking: Reported on 8/12/2024), Disp: , Rfl:          Medication considerations/contraindications     -Patient denies personal history of pancreatitis. Patient also denies personal and family history of medullary thyroid cancer, and multiple endocrine neoplasia type 2 (MEN 2 tumor). -Patient denies any history of kidney stones, seizures, or glaucoma, diabetic retinopathy, gall bladder disease, gastroparesis, hyperthyroidism.  -Denies Hx of CAD, PAD, palpitations, arrhythmia, uncontrolled hypertension  -Denies uncontrolled anxiety or depression, suicidal behavior or thinking , insomnia or sleep disturbance.         Labs     Most recent labs reviewed   Lab Results   Component Value Date    SODIUM 139 07/13/2024    K 4.2 07/13/2024     07/13/2024    CO2 29 07/13/2024    AGAP 8 07/13/2024    BUN 15 07/13/2024    CREATININE 0.87 07/13/2024    GLUC 121 (H) 04/18/2022    GLUF 97 07/13/2024    CALCIUM 9.6 07/13/2024    AST 16 07/13/2024    ALT 11 07/13/2024    ALKPHOS 76 07/13/2024    TP 7.5 07/13/2024    TBILI 0.61 07/13/2024    EGFR 71 07/13/2024     Lab Results   Component Value Date    HGBA1C 5.5 07/13/2024     Lab Results   Component Value Date    AIB9DOXPEMFG 3.149 07/13/2024     Lab Results   Component Value Date    CHOLESTEROL 172 07/13/2024     Lab Results   Component Value Date    HDL 45 (L) 07/13/2024     Lab Results   Component Value Date    TRIG 123 07/13/2024     Lab Results   Component Value Date    LDLCALC 102 (H) 07/13/2024     No results found for: \"VITD\"  No components found for: \"FASTINS\"   "

## 2024-11-19 ENCOUNTER — RESULTS FOLLOW-UP (OUTPATIENT)
Dept: BARIATRICS | Facility: CLINIC | Age: 64
End: 2024-11-19

## 2024-11-19 DIAGNOSIS — K91.2 POSTSURGICAL MALABSORPTION: Primary | ICD-10-CM

## 2024-11-19 DIAGNOSIS — R79.89 HIGH SERUM PARATHYROID HORMONE (PTH): ICD-10-CM

## 2024-11-19 NOTE — RESULT ENCOUNTER NOTE
Please let Silvia know about her labs, thank you:    -PTH improving but still elevated - how much calcium is she taking? She should be taking 18-2000mg per day for now divided into 600mg doses and continue with her current vitamin D regimen - repeat in 3 months - if still elevated we will send her to Endo

## 2024-11-20 DIAGNOSIS — F51.01 PRIMARY INSOMNIA: ICD-10-CM

## 2024-11-21 RX ORDER — ZOLPIDEM TARTRATE 5 MG/1
5 TABLET ORAL
Qty: 30 TABLET | Refills: 0 | Status: SHIPPED | OUTPATIENT
Start: 2024-11-21

## 2024-12-20 DIAGNOSIS — F51.01 PRIMARY INSOMNIA: ICD-10-CM

## 2024-12-20 RX ORDER — ZOLPIDEM TARTRATE 5 MG/1
5 TABLET ORAL
Qty: 30 TABLET | Refills: 0 | Status: SHIPPED | OUTPATIENT
Start: 2024-12-20

## 2024-12-24 ENCOUNTER — HOSPITAL ENCOUNTER (OUTPATIENT)
Dept: MAMMOGRAPHY | Facility: CLINIC | Age: 64
Discharge: HOME | End: 2024-12-24
Payer: COMMERCIAL

## 2024-12-24 ENCOUNTER — RESULTS FOLLOW-UP (OUTPATIENT)
Dept: FAMILY MEDICINE CLINIC | Facility: CLINIC | Age: 64
End: 2024-12-24

## 2024-12-24 ENCOUNTER — HOSPITAL ENCOUNTER (OUTPATIENT)
Dept: ULTRASOUND IMAGING | Facility: CLINIC | Age: 64
Discharge: HOME | End: 2024-12-24
Payer: COMMERCIAL

## 2024-12-24 VITALS — WEIGHT: 267 LBS | HEIGHT: 63 IN | BODY MASS INDEX: 47.31 KG/M2

## 2024-12-24 DIAGNOSIS — R92.8 ABNORMAL MAMMOGRAM: Primary | ICD-10-CM

## 2024-12-24 DIAGNOSIS — R92.8 ABNORMAL MAMMOGRAM: ICD-10-CM

## 2024-12-24 PROCEDURE — 76642 ULTRASOUND BREAST LIMITED: CPT

## 2024-12-24 PROCEDURE — G0279 TOMOSYNTHESIS, MAMMO: HCPCS

## 2024-12-24 PROCEDURE — 77066 DX MAMMO INCL CAD BI: CPT

## 2025-01-07 DIAGNOSIS — E66.813 CLASS 3 SEVERE OBESITY DUE TO EXCESS CALORIES WITH SERIOUS COMORBIDITY AND BODY MASS INDEX (BMI) OF 45.0 TO 49.9 IN ADULT (HCC): ICD-10-CM

## 2025-01-07 DIAGNOSIS — E66.01 CLASS 3 SEVERE OBESITY DUE TO EXCESS CALORIES WITH SERIOUS COMORBIDITY AND BODY MASS INDEX (BMI) OF 45.0 TO 49.9 IN ADULT (HCC): ICD-10-CM

## 2025-01-07 RX ORDER — TIRZEPATIDE 5 MG/.5ML
5 INJECTION, SOLUTION SUBCUTANEOUS WEEKLY
Qty: 2 ML | Refills: 0 | Status: SHIPPED | OUTPATIENT
Start: 2025-01-07 | End: 2025-03-04

## 2025-01-17 DIAGNOSIS — F41.9 ANXIETY: ICD-10-CM

## 2025-01-17 DIAGNOSIS — M79.7 FIBROMYALGIA, PRIMARY: ICD-10-CM

## 2025-01-17 DIAGNOSIS — I10 ESSENTIAL HYPERTENSION: ICD-10-CM

## 2025-01-17 DIAGNOSIS — F51.01 PRIMARY INSOMNIA: ICD-10-CM

## 2025-01-17 RX ORDER — GABAPENTIN 400 MG/1
400 CAPSULE ORAL 3 TIMES DAILY
Qty: 300 CAPSULE | Refills: 0 | Status: SHIPPED | OUTPATIENT
Start: 2025-01-17

## 2025-01-17 RX ORDER — BISOPROLOL FUMARATE AND HYDROCHLOROTHIAZIDE 10; 6.25 MG/1; MG/1
1 TABLET ORAL DAILY
Qty: 30 TABLET | Refills: 0 | Status: SHIPPED | OUTPATIENT
Start: 2025-01-17

## 2025-01-17 RX ORDER — DULOXETIN HYDROCHLORIDE 60 MG/1
60 CAPSULE, DELAYED RELEASE ORAL DAILY
Qty: 90 CAPSULE | Refills: 0 | Status: SHIPPED | OUTPATIENT
Start: 2025-01-17

## 2025-01-17 RX ORDER — ZOLPIDEM TARTRATE 5 MG/1
5 TABLET ORAL
Qty: 30 TABLET | Refills: 0 | Status: SHIPPED | OUTPATIENT
Start: 2025-01-17

## 2025-01-17 RX ORDER — DULOXETIN HYDROCHLORIDE 30 MG/1
30 CAPSULE, DELAYED RELEASE ORAL DAILY
Qty: 90 CAPSULE | Refills: 0 | Status: SHIPPED | OUTPATIENT
Start: 2025-01-17

## 2025-02-07 DIAGNOSIS — E66.813 CLASS 3 SEVERE OBESITY DUE TO EXCESS CALORIES WITH SERIOUS COMORBIDITY AND BODY MASS INDEX (BMI) OF 45.0 TO 49.9 IN ADULT (HCC): ICD-10-CM

## 2025-02-07 DIAGNOSIS — E66.01 CLASS 3 SEVERE OBESITY DUE TO EXCESS CALORIES WITH SERIOUS COMORBIDITY AND BODY MASS INDEX (BMI) OF 45.0 TO 49.9 IN ADULT (HCC): ICD-10-CM

## 2025-02-07 RX ORDER — TIRZEPATIDE 5 MG/.5ML
5 INJECTION, SOLUTION SUBCUTANEOUS WEEKLY
Qty: 2 ML | Refills: 0 | Status: SHIPPED | OUTPATIENT
Start: 2025-02-07 | End: 2025-02-10

## 2025-02-10 ENCOUNTER — OFFICE VISIT (OUTPATIENT)
Dept: BARIATRICS | Facility: CLINIC | Age: 65
End: 2025-02-10
Payer: COMMERCIAL

## 2025-02-10 VITALS
HEART RATE: 87 BPM | OXYGEN SATURATION: 99 % | WEIGHT: 259.4 LBS | DIASTOLIC BLOOD PRESSURE: 80 MMHG | BODY MASS INDEX: 45.96 KG/M2 | SYSTOLIC BLOOD PRESSURE: 132 MMHG | HEIGHT: 63 IN

## 2025-02-10 DIAGNOSIS — E66.813 CLASS 3 SEVERE OBESITY DUE TO EXCESS CALORIES WITH SERIOUS COMORBIDITY AND BODY MASS INDEX (BMI) OF 45.0 TO 49.9 IN ADULT (HCC): Primary | ICD-10-CM

## 2025-02-10 DIAGNOSIS — E78.5 HYPERLIPIDEMIA, UNSPECIFIED HYPERLIPIDEMIA TYPE: ICD-10-CM

## 2025-02-10 DIAGNOSIS — E66.01 CLASS 3 SEVERE OBESITY DUE TO EXCESS CALORIES WITH SERIOUS COMORBIDITY AND BODY MASS INDEX (BMI) OF 45.0 TO 49.9 IN ADULT (HCC): Primary | ICD-10-CM

## 2025-02-10 DIAGNOSIS — R73.03 PRE-DIABETES: ICD-10-CM

## 2025-02-10 DIAGNOSIS — E03.9 HYPOTHYROIDISM, UNSPECIFIED TYPE: ICD-10-CM

## 2025-02-10 PROCEDURE — 99215 OFFICE O/P EST HI 40 MIN: CPT | Performed by: INTERNAL MEDICINE

## 2025-02-10 RX ORDER — TIRZEPATIDE 7.5 MG/.5ML
7.5 INJECTION, SOLUTION SUBCUTANEOUS WEEKLY
Qty: 2 ML | Refills: 0 | Status: SHIPPED | OUTPATIENT
Start: 2025-02-10 | End: 2025-04-07

## 2025-02-10 RX ORDER — TIRZEPATIDE 10 MG/.5ML
10 INJECTION, SOLUTION SUBCUTANEOUS WEEKLY
Qty: 2 ML | Refills: 2 | Status: SHIPPED | OUTPATIENT
Start: 2025-04-04 | End: 2025-06-27

## 2025-02-10 NOTE — ASSESSMENT & PLAN NOTE
Antiobesity Medications/Medical --  Currently on 5 mg of Zepbound  Has lost 51 pounds since initiation-16.4% of her body weight-which is close to clinical trial data  Increase the dose to 7.5 mg and postdated prescription provided for 10 mg of Zepbound    Discussed oral medication options which could be used in combination therapy for slower weight loss or weight loss plateaus.  Metformin would be first choice given synergistic mechanism of action.    Patient is currently on Cymbalta 90 mg so would not want to use Wellbutrin which is in the same drug class.  Could consider adding naltrexone to Cymbalta.  Phentermine would be a last option given its stimulant properties.  No contraindications to Topamax.  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

## 2025-02-10 NOTE — PROGRESS NOTES
Program: Conservative Program    Assessment/Plan     Silvia Antonio  is a 64 y.o. female with  returns to follow up  for treatment of excess body weight and associated risk factors/co-morbidities.     Brief Summary: From chart review   -s/p OPEN Yoanna-En-Y Gastric Bypass in 1993 at [facility].     Initial: 311lbs  Weight on 8/12/2024: 310.2lbs  Cheikh: 170lbs maintained cheikh weight for about 10 years  Weight regain:+140 lbs  Target Weight :170 lbs  BMI 55 kg/m2 - Above 40- Obesity Class III      Weight on 11/18/2024: 277 lbs  BMI on 11/18/2024: 49 kg/m2 Above 40- Obesity Class III  Difference: -33 lbs    Weight on 2/10/2025 :118 kg (259 lb 6.4 oz) (-18)  BMI on  2/10/2025 : Body mass index is 45.95 kg/m². Above 40- Obesity Class III  Difference: -51 lbs           Class 3 severe obesity due to excess calories with serious comorbidity and body mass index (BMI) of 45.0 to 49.9 in adult (HCC)  Antiobesity Medications/Medical --  Currently on 5 mg of Zepbound  Has lost 51 pounds since initiation-16.4% of her body weight-which is close to clinical trial data  Increase the dose to 7.5 mg and postdated prescription provided for 10 mg of Zepbound    Discussed oral medication options which could be used in combination therapy for slower weight loss or weight loss plateaus.  Metformin would be first choice given synergistic mechanism of action.    Patient is currently on Cymbalta 90 mg so would not want to use Wellbutrin which is in the same drug class.  Could consider adding naltrexone to Cymbalta.  Phentermine would be a last option given its stimulant properties.  No contraindications to Topamax.  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         Return visit:  2-3 months     Nutrition   Do not skip meals. Avoid sugary beverages. At least 64oz of water daily.    Behavioral/Stress   Food log via misty or provided paper log (misty options include www.myfitnesspal.com, sparkpeople.com, loseit.com, calorieking.com, [employer]). Encouraged mindful eating    Physical Activity  Increase physical activity by 10 minutes daily. Gradually increase physical activity to a goal of 5 days per week for 30 minutes of MODERATE intensity ( ( should be able to pass the \"talk test\" but should not be able to sing.  target 150-300 minutes  PLUS 2-3 days per week of FULL BODY resistance training. Progression will be addressed at follow up visits. Encouraged planning regarding establishing physical activity routine    Sleep  Provided sleep hygiene counseling and importance of having adequate sleep duration          Silvia was seen today for follow-up.    Diagnoses and all orders for this visit:    Class 3 severe obesity due to excess calories with serious comorbidity and body mass index (BMI) of 45.0 to 49.9 in adult (HCC)  -     tirzepatide (Zepbound) 7.5 mg/0.5 mL auto-injector; Inject 0.5 mL (7.5 mg total) under the skin once a week  -     tirzepatide (Zepbound) 10 mg/0.5 mL auto-injector; Inject 0.5 mL (10 mg total) under the skin once a week Do not start before April 4, 2025.  -     Comprehensive metabolic panel; Future  -     Hemoglobin A1C; Future    Pre-diabetes  -     Hemoglobin A1C; Future  -     Insulin, fasting; Future    Hyperlipidemia, unspecified hyperlipidemia type  -     Lipid panel; Future    Hypothyroidism, unspecified type  -     T3, free; Future  -     TSH, 3rd generation with Free T4 reflex; Future                  Total time spent " reviewing chart, interviewing patient, examining patient, discussing plan, answering all questions, and documentin minutes with >50% face-to-face time with the patient.            Weight trajectory     Wt Readings from Last 20 Encounters:   02/10/25 118 kg (259 lb 6.4 oz)   24 121 kg (267 lb)   24 126 kg (277 lb 9.6 oz)   10/28/24 128 kg (282 lb)   24 (!) 140 kg (309 lb)   24 (!) 140 kg (309 lb)   24 (!) 140 kg (309 lb)   24 (!) 141 kg (310 lb 3.2 oz)   24 (!) 141 kg (310 lb 3.2 oz)   24 (!) 143 kg (315 lb)   24 (!) 140 kg (308 lb)   24 (!) 142 kg (313 lb)   24 (!) 142 kg (313 lb)   24 (!) 142 kg (313 lb)   24 136 kg (300 lb)   24 136 kg (300 lb)   24 136 kg (300 lb)   23 (!) 138 kg (304 lb)   23 (!) 140 kg (308 lb)   10/19/22 132 kg (292 lb)           Lifestyle questionnaire     Met with surgical RD today [id number] average 1500 , 70-gm of protein   Diet recall:  B - 32oz iced coffee with protein shake scrambled eggs whipple sometimes yogurt or oatmeal  Snack - varies - special K bar or oatmeal or yogurt or cookies  L -turkey chicken cottage  cheese bowls  D - chicken or sausage or pork sweet potato salad or vegetable occasional pizza  HS - stopped snacking   Eating out vs cooking at home-1-2 times a week    Beverages  Water-- 64    Caffeine/tea--32     SSB- no    Alcohol: stopped beer  Smoking: no  Drug use: no    Physical Activity --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 Walking is unsteady she has back surgery. Has treadmill at home , wants to get into a routine. Total gym in garage   Sleep -- STOP- BANG-;   7 hours    Occupation-Works in [employer].   Psycho social- Lives with her  - very high stress with him. Has 2 [employer] Wire Pointers (male and female).         Anti obesity Medications/ Medical---    Weight loss  "medication and dose: Zepbound  Date initiated: August 2024        Colonoscopy: UTD  Mammogram: UTD                        Objective         The following portions of the patient's history were reviewed and updated as appropriate: allergies, current medications, past family history, past medical history, past social history, past surgical history, and problem list.      /80   Pulse 87   Ht 5' 3\" (1.6 m)   Wt 118 kg (259 lb 6.4 oz)   SpO2 99%   BMI 45.95 kg/m²             Review of Systems   Constitutional:  Negative for fatigue.   HENT:  Negative for sore throat.    Respiratory:  Negative for cough and shortness of breath.    Cardiovascular:  Negative for chest pain, palpitations and leg swelling.   Gastrointestinal:  Negative for abdominal pain, constipation, diarrhea and nausea.   Genitourinary:  Negative for dysuria.   Musculoskeletal:  Negative for arthralgias and back pain.   Skin:  Negative for rash.   Neurological:  Negative for headaches.   Psychiatric/Behavioral:  Negative for dysphoric mood. The patient is not nervous/anxious.          Physical Exam  Vitals and nursing note reviewed.   Constitutional:       Appearance: Normal appearance.   HENT:      Head: Normocephalic.   Pulmonary:      Effort: Pulmonary effort is normal.   Neurological:      General: No focal deficit present.      Mental Status: She is alert and oriented to person, place, and time.   Psychiatric:         Mood and Affect: Mood normal.         Behavior: Behavior normal.         Thought Content: Thought content normal.         Judgment: Judgment normal.          Current medications       Current Outpatient Medications:     bisoprolol-hydrochlorothiazide (ZIAC) 10-6.25 MG per tablet, Take 1 tablet by mouth daily, Disp: 30 tablet, Rfl: 0    Cholecalciferol (Vitamin D3) 125 MCG (5000 UT) CHEW, Chew 1,000 Units daily, Disp: , Rfl:     Cyanocobalamin (VITAMIN B 12 PO), Take by mouth, Disp: , Rfl:     DULoxetine (CYMBALTA) 30 mg " delayed release capsule, Take 1 capsule (30 mg total) by mouth daily, Disp: 90 capsule, Rfl: 0    DULoxetine (CYMBALTA) 60 mg delayed release capsule, Take 1 capsule (60 mg total) by mouth daily, Disp: 90 capsule, Rfl: 0    gabapentin (NEURONTIN) 400 mg capsule, Take 1 capsule (400 mg total) by mouth 3 (three) times a day, Disp: 300 capsule, Rfl: 0    MAGNESIUM GLYCINATE PO, Take 1 tablet by mouth in the morning 500 MG, Disp: , Rfl:     [START ON 4/4/2025] tirzepatide (Zepbound) 10 mg/0.5 mL auto-injector, Inject 0.5 mL (10 mg total) under the skin once a week Do not start before April 4, 2025., Disp: 2 mL, Rfl: 2    tirzepatide (Zepbound) 7.5 mg/0.5 mL auto-injector, Inject 0.5 mL (7.5 mg total) under the skin once a week, Disp: 2 mL, Rfl: 0    zolpidem (AMBIEN) 5 mg tablet, Take 1 tablet (5 mg total) by mouth daily at bedtime as needed for sleep, Disp: 30 tablet, Rfl: 0    Magnesium Carbonate, Antacid, (MAGNESIUM CARBONATE PO), Take by mouth (Patient not taking: Reported on 8/12/2024), Disp: , Rfl:          Medication considerations/contraindications     -Patient denies personal history of pancreatitis. Patient also denies personal and family history of medullary thyroid cancer, and multiple endocrine neoplasia type 2 (MEN 2 tumor). -Patient denies any history of kidney stones, seizures, or glaucoma, diabetic retinopathy, gall bladder disease, gastroparesis, hyperthyroidism.  -Denies Hx of CAD, PAD, palpitations, arrhythmia, uncontrolled hypertension  -Denies uncontrolled anxiety or depression, suicidal behavior or thinking , insomnia or sleep disturbance.         Labs     Most recent labs reviewed   Lab Results   Component Value Date    SODIUM 139 07/13/2024    K 4.2 07/13/2024     07/13/2024    CO2 29 07/13/2024    AGAP 8 07/13/2024    BUN 15 07/13/2024    CREATININE 0.87 07/13/2024    GLUC 121 (H) 04/18/2022    GLUF 97 07/13/2024    CALCIUM 9.6 07/13/2024    AST 16 07/13/2024    ALT 11 07/13/2024     "ALKPHOS 76 07/13/2024    TP 7.5 07/13/2024    TBILI 0.61 07/13/2024    EGFR 71 07/13/2024     Lab Results   Component Value Date    HGBA1C 5.5 07/13/2024     Lab Results   Component Value Date    MIZ6TAHABKRG 3.149 07/13/2024     Lab Results   Component Value Date    CHOLESTEROL 172 07/13/2024     Lab Results   Component Value Date    HDL 45 (L) 07/13/2024     Lab Results   Component Value Date    TRIG 123 07/13/2024     Lab Results   Component Value Date    LDLCALC 102 (H) 07/13/2024     No results found for: \"VITD\"  No components found for: \"FASTINS\"   "

## 2025-02-13 ENCOUNTER — OFFICE VISIT (OUTPATIENT)
Dept: FAMILY MEDICINE CLINIC | Facility: CLINIC | Age: 65
End: 2025-02-13
Payer: COMMERCIAL

## 2025-02-13 VITALS
OXYGEN SATURATION: 95 % | WEIGHT: 263.2 LBS | RESPIRATION RATE: 18 BRPM | HEIGHT: 63 IN | SYSTOLIC BLOOD PRESSURE: 130 MMHG | TEMPERATURE: 97.4 F | DIASTOLIC BLOOD PRESSURE: 76 MMHG | BODY MASS INDEX: 46.64 KG/M2 | HEART RATE: 64 BPM

## 2025-02-13 DIAGNOSIS — F41.1 GENERALIZED ANXIETY DISORDER: ICD-10-CM

## 2025-02-13 DIAGNOSIS — M79.7 FIBROMYALGIA, PRIMARY: ICD-10-CM

## 2025-02-13 DIAGNOSIS — E66.813 CLASS 3 SEVERE OBESITY DUE TO EXCESS CALORIES WITH SERIOUS COMORBIDITY AND BODY MASS INDEX (BMI) OF 45.0 TO 49.9 IN ADULT (HCC): ICD-10-CM

## 2025-02-13 DIAGNOSIS — I10 ESSENTIAL HYPERTENSION: Primary | ICD-10-CM

## 2025-02-13 DIAGNOSIS — G25.81 RESTLESS LEG SYNDROME: ICD-10-CM

## 2025-02-13 DIAGNOSIS — F51.01 PRIMARY INSOMNIA: ICD-10-CM

## 2025-02-13 DIAGNOSIS — E66.01 CLASS 3 SEVERE OBESITY DUE TO EXCESS CALORIES WITH SERIOUS COMORBIDITY AND BODY MASS INDEX (BMI) OF 45.0 TO 49.9 IN ADULT (HCC): ICD-10-CM

## 2025-02-13 PROCEDURE — 99214 OFFICE O/P EST MOD 30 MIN: CPT | Performed by: FAMILY MEDICINE

## 2025-02-13 RX ORDER — TRAZODONE HYDROCHLORIDE 50 MG/1
50 TABLET, FILM COATED ORAL
Qty: 30 TABLET | Refills: 1 | Status: SHIPPED | OUTPATIENT
Start: 2025-02-13

## 2025-02-13 RX ORDER — BISOPROLOL FUMARATE AND HYDROCHLOROTHIAZIDE 10; 6.25 MG/1; MG/1
1 TABLET ORAL DAILY
Qty: 90 TABLET | Refills: 1 | Status: SHIPPED | OUTPATIENT
Start: 2025-02-13

## 2025-02-13 NOTE — ASSESSMENT & PLAN NOTE
Patient is stable with current anti-hypertensive medicine and continue to follow a low sodium diet and take current medication. All questions about this condition were answered today.   Orders:  •  bisoprolol-hydrochlorothiazide (ZIAC) 10-6.25 MG per tablet; Take 1 tablet by mouth daily

## 2025-02-13 NOTE — ASSESSMENT & PLAN NOTE
Discussed with patient use of sedative  medications and good  sleep hygiene to maximize treatment for this problem. Pt  to use sedatives only prior to sleep and cautioned them about usage at any other time. All patient questions about this problem answered today.   Orders:  •  traZODone (DESYREL) 50 mg tablet; Take 1 tablet (50 mg total) by mouth daily at bedtime

## 2025-02-13 NOTE — PROGRESS NOTES
Name: Silvia Antonio      : 1960      MRN: [id number]  Encounter Provider: Rick Davies MD  Encounter Date: 2025   Encounter department: [facility]  :  Assessment & Plan  Essential hypertension  Patient is stable with current anti-hypertensive medicine and continue to follow a low sodium diet and take current medication. All questions about this condition were answered today.   Orders:  •  bisoprolol-hydrochlorothiazide (ZIAC) 10-6.25 MG per tablet; Take 1 tablet by mouth daily    Generalized anxiety disorder  Patient to continue utilizing medical therapy as well as counseling sources as applicable to condition. If suicidal thought or fear of suicide attempt, to call 911 and seek help immediately. Medications and therapy reviewed today and all patient  questions answered today.        Fibromyalgia, primary  Patient is stable  and will continue present plan of care and reassess at next routine visit. All questions about this problem from patient were answered today.        Class 3 severe obesity due to excess calories with serious comorbidity and body mass index (BMI) of 45.0 to 49.9 in adult (HCC)  Patient to increase exercise and partake of a diet with less calories to promote  weight loss        Primary insomnia  Discussed with patient use of sedative  medications and good  sleep hygiene to maximize treatment for this problem. Pt  to use sedatives only prior to sleep and cautioned them about usage at any other time. All patient questions about this problem answered today.   Orders:  •  traZODone (DESYREL) 50 mg tablet; Take 1 tablet (50 mg total) by mouth daily at bedtime    Restless leg syndrome  Patient is stable  and will continue present plan of care and reassess at next routine visit. All questions about this problem from patient were answered today.               History of Present Illness   Hypertension  This is a chronic problem. The current episode started more  "than 1 year ago. The problem has been gradually improving since onset. The problem is controlled. Associated symptoms include anxiety and malaise/fatigue. Pertinent negatives include no blurred vision, chest pain, headaches, neck pain, orthopnea, palpitations, peripheral edema, PND, shortness of breath or sweats. Agents associated with hypertension include no associated agents and NSAIDs. There are no associated agents and NSAIDs to hypertension. Risk factors for coronary artery disease include family history, obesity and stress. There are no compliance problems.      Review of Systems   Constitutional:  Positive for malaise/fatigue.   Eyes:  Negative for blurred vision.   Respiratory:  Negative for shortness of breath.    Cardiovascular:  Negative for chest pain, palpitations, orthopnea and PND.   Musculoskeletal:  Negative for neck pain.   Neurological:  Negative for headaches.       Objective   /76 (BP Location: Left arm, Patient Position: Sitting, Cuff Size: Large)   Pulse 64   Temp (!) 97.4 °F (36.3 °C) (Temporal)   Resp 18   Ht 5' 3\" (1.6 m)   Wt 119 kg (263 lb 3.2 oz)   SpO2 95%   BMI 46.62 kg/m²      Physical Exam    "

## 2025-03-14 ENCOUNTER — OFFICE VISIT (OUTPATIENT)
Dept: FAMILY MEDICINE CLINIC | Facility: CLINIC | Age: 65
End: 2025-03-14
Payer: COMMERCIAL

## 2025-03-14 VITALS
BODY MASS INDEX: 45.71 KG/M2 | DIASTOLIC BLOOD PRESSURE: 76 MMHG | HEIGHT: 63 IN | TEMPERATURE: 98.2 F | HEART RATE: 61 BPM | OXYGEN SATURATION: 97 % | SYSTOLIC BLOOD PRESSURE: 130 MMHG | WEIGHT: 258 LBS

## 2025-03-14 DIAGNOSIS — E66.01 CLASS 3 SEVERE OBESITY DUE TO EXCESS CALORIES WITH SERIOUS COMORBIDITY AND BODY MASS INDEX (BMI) OF 45.0 TO 49.9 IN ADULT (HCC): ICD-10-CM

## 2025-03-14 DIAGNOSIS — I10 ESSENTIAL HYPERTENSION: ICD-10-CM

## 2025-03-14 DIAGNOSIS — G89.4 CHRONIC PAIN SYNDROME: Primary | ICD-10-CM

## 2025-03-14 DIAGNOSIS — E66.813 CLASS 3 SEVERE OBESITY DUE TO EXCESS CALORIES WITH SERIOUS COMORBIDITY AND BODY MASS INDEX (BMI) OF 45.0 TO 49.9 IN ADULT (HCC): ICD-10-CM

## 2025-03-14 DIAGNOSIS — F51.01 PRIMARY INSOMNIA: ICD-10-CM

## 2025-03-14 DIAGNOSIS — M79.7 FIBROMYALGIA, PRIMARY: ICD-10-CM

## 2025-03-14 DIAGNOSIS — F41.1 GENERALIZED ANXIETY DISORDER: ICD-10-CM

## 2025-03-14 PROCEDURE — 99214 OFFICE O/P EST MOD 30 MIN: CPT | Performed by: FAMILY MEDICINE

## 2025-03-14 RX ORDER — ZOLPIDEM TARTRATE 5 MG/1
5 TABLET ORAL
Qty: 90 TABLET | Refills: 1 | Status: SHIPPED | OUTPATIENT
Start: 2025-03-14

## 2025-03-14 NOTE — ASSESSMENT & PLAN NOTE
Discussed with patient use of sedative  medications and good  sleep hygiene to maximize treatment for this problem. Pt  to use sedatives only prior to sleep and cautioned them about usage at any other time. All patient questions about this problem answered today.   Orders:    zolpidem (AMBIEN) 5 mg tablet; Take 1 tablet (5 mg total) by mouth daily at bedtime as needed for sleep

## 2025-03-14 NOTE — PROGRESS NOTES
Name: Silvia Antonio      : 1960      MRN: [id number]  Encounter Provider: Rick Davies MD  Encounter Date: 3/14/2025   Encounter department: [facility]  :  Assessment & Plan  Chronic pain syndrome  Patient is stable  and will continue present plan of care and reassess at next routine visit. All questions about this problem from patient were answered today.        Class 3 severe obesity due to excess calories with serious comorbidity and body mass index (BMI) of 45.0 to 49.9 in adult (HCC)  Patient to increase exercise and partake of a diet with less calories to promote  weight loss        Essential hypertension  Patient is stable with current anti-hypertensive medicine and continue to follow a low sodium diet and take current medication. All questions about this condition were answered today.        Fibromyalgia, primary  Patient is stable  and will continue present plan of care and reassess at next routine visit. All questions about this problem from patient were answered today.        Generalized anxiety disorder  Patient to continue utilizing medical therapy as well as counseling sources as applicable to condition. If suicidal thought or fear of suicide attempt, to call 911 and seek help immediately. Medications and therapy reviewed today and all patient  questions answered today.        Primary insomnia  Discussed with patient use of sedative  medications and good  sleep hygiene to maximize treatment for this problem. Pt  to use sedatives only prior to sleep and cautioned them about usage at any other time. All patient questions about this problem answered today.   Orders:    zolpidem (AMBIEN) 5 mg tablet; Take 1 tablet (5 mg total) by mouth daily at bedtime as needed for sleep           History of Present Illness   This is a 64-year-old female seen and examined for recheck on multiple medical problems.  Patient was having some problems insomnia and her last visit and we  "started her on some trazodone which she said was not very good she said she was very sedated and had a big hangover in the morning she would have to go back to the Ambien and we will refill that for her.  She is also for recheck on her blood pressure and has been doing very well and we will continue with her current medical regimen also she states that she also has neuropathy and that she is going to be set up for an EMG and is coming up in May so we will watch for the results of that.  Will check her back at her next routine checkup which is already scheduled and her labs are already ordered.      Review of Systems   Constitutional:  Negative for activity change, appetite change, fatigue and fever.   HENT:  Negative for congestion, ear pain, postnasal drip, rhinorrhea, sinus pressure, sinus pain, sneezing and sore throat.    Eyes:  Negative for pain and redness.   Respiratory:  Negative for apnea, cough, chest tightness, shortness of breath and wheezing.    Cardiovascular:  Negative for chest pain, palpitations and leg swelling.   Gastrointestinal:  Negative for abdominal pain, constipation, diarrhea, nausea and vomiting.   Endocrine: Negative for cold intolerance and heat intolerance.   Genitourinary:  Negative for difficulty urinating, dysuria, frequency, hematuria and urgency.   Musculoskeletal:  Negative for arthralgias, back pain, gait problem and myalgias.   Skin:  Negative for rash.   Neurological:  Negative for dizziness, speech difficulty, weakness, numbness and headaches.   Hematological:  Does not bruise/bleed easily.   Psychiatric/Behavioral:  Positive for sleep disturbance. Negative for agitation, confusion and hallucinations.        Objective   /76 (BP Location: Left arm, Patient Position: Sitting, Cuff Size: Large)   Pulse 61   Temp 98.2 °F (36.8 °C) (Temporal)   Ht 5' 3\" (1.6 m)   Wt 117 kg (258 lb)   SpO2 97%   BMI 45.70 kg/m²      Physical Exam  Constitutional:       Appearance: Normal " appearance. She is not ill-appearing.   HENT:      Head: Normocephalic and atraumatic.      Right Ear: Tympanic membrane normal.      Left Ear: Tympanic membrane normal.      Nose: Nose normal.      Mouth/Throat:      Mouth: Mucous membranes are moist.   Eyes:      Extraocular Movements: Extraocular movements intact.      Conjunctiva/sclera: Conjunctivae normal.      Pupils: Pupils are equal, round, and reactive to light.   Cardiovascular:      Rate and Rhythm: Normal rate and regular rhythm.   Pulmonary:      Effort: Pulmonary effort is normal. No respiratory distress.      Breath sounds: Normal breath sounds. No wheezing.   Abdominal:      General: Bowel sounds are normal.      Palpations: Abdomen is soft.      Tenderness: There is no abdominal tenderness.   Musculoskeletal:         General: No tenderness. Normal range of motion.      Cervical back: Normal range of motion and neck supple.      Right lower leg: No edema.      Left lower leg: No edema.   Skin:     General: Skin is warm and dry.   Neurological:      Mental Status: She is alert and oriented to person, place, and time.   Psychiatric:         Mood and Affect: Mood normal.         Behavior: Behavior normal.         Thought Content: Thought content normal.         Judgment: Judgment normal.

## 2025-04-11 DIAGNOSIS — E66.813 CLASS 3 SEVERE OBESITY DUE TO EXCESS CALORIES WITH SERIOUS COMORBIDITY AND BODY MASS INDEX (BMI) OF 45.0 TO 49.9 IN ADULT: ICD-10-CM

## 2025-04-11 RX ORDER — TIRZEPATIDE 10 MG/.5ML
10 INJECTION, SOLUTION SUBCUTANEOUS WEEKLY
Qty: 2 ML | Refills: 0 | Status: SHIPPED | OUTPATIENT
Start: 2025-04-11 | End: 2025-07-04

## 2025-04-23 DIAGNOSIS — F41.9 ANXIETY: ICD-10-CM

## 2025-04-23 DIAGNOSIS — M79.7 FIBROMYALGIA, PRIMARY: ICD-10-CM

## 2025-04-23 RX ORDER — DULOXETIN HYDROCHLORIDE 30 MG/1
30 CAPSULE, DELAYED RELEASE ORAL DAILY
Qty: 90 CAPSULE | Refills: 1 | Status: SHIPPED | OUTPATIENT
Start: 2025-04-23

## 2025-04-23 RX ORDER — GABAPENTIN 400 MG/1
400 CAPSULE ORAL 3 TIMES DAILY
Qty: 300 CAPSULE | Refills: 0 | Status: SHIPPED | OUTPATIENT
Start: 2025-04-23

## 2025-04-23 RX ORDER — DULOXETIN HYDROCHLORIDE 60 MG/1
60 CAPSULE, DELAYED RELEASE ORAL DAILY
Qty: 90 CAPSULE | Refills: 1 | Status: SHIPPED | OUTPATIENT
Start: 2025-04-23

## 2025-05-03 ENCOUNTER — APPOINTMENT (OUTPATIENT)
Dept: LAB | Facility: HOSPITAL | Age: 65
End: 2025-05-03
Attending: PREVENTIVE MEDICINE
Payer: COMMERCIAL

## 2025-05-03 ENCOUNTER — APPOINTMENT (OUTPATIENT)
Dept: LAB | Facility: HOSPITAL | Age: 65
End: 2025-05-03
Payer: COMMERCIAL

## 2025-05-03 ENCOUNTER — RESULTS FOLLOW-UP (OUTPATIENT)
Dept: BARIATRICS | Facility: CLINIC | Age: 65
End: 2025-05-03

## 2025-05-03 DIAGNOSIS — E66.813 CLASS 3 SEVERE OBESITY DUE TO EXCESS CALORIES WITH SERIOUS COMORBIDITY AND BODY MASS INDEX (BMI) OF 45.0 TO 49.9 IN ADULT: ICD-10-CM

## 2025-05-03 DIAGNOSIS — E61.1 HYPOFERREMIA: ICD-10-CM

## 2025-05-03 DIAGNOSIS — K91.2 POSTGASTRECTOMY MALABSORPTION: ICD-10-CM

## 2025-05-03 DIAGNOSIS — G25.81 RESTLESS LEG SYNDROME: ICD-10-CM

## 2025-05-03 DIAGNOSIS — E53.8 B12 DEFICIENCY: ICD-10-CM

## 2025-05-03 DIAGNOSIS — K91.2 POSTSURGICAL MALABSORPTION: ICD-10-CM

## 2025-05-03 DIAGNOSIS — E03.9 HYPOTHYROIDISM, UNSPECIFIED TYPE: ICD-10-CM

## 2025-05-03 DIAGNOSIS — E78.5 HYPERLIPIDEMIA, UNSPECIFIED HYPERLIPIDEMIA TYPE: ICD-10-CM

## 2025-05-03 DIAGNOSIS — R73.03 PRE-DIABETES: ICD-10-CM

## 2025-05-03 DIAGNOSIS — Z00.8 HEALTH EXAMINATION IN POPULATION SURVEY: ICD-10-CM

## 2025-05-03 DIAGNOSIS — Z90.3 POSTGASTRECTOMY MALABSORPTION: ICD-10-CM

## 2025-05-03 DIAGNOSIS — R79.89 HIGH SERUM PARATHYROID HORMONE (PTH): ICD-10-CM

## 2025-05-03 LAB
25(OH)D3 SERPL-MCNC: 62.4 NG/ML (ref 30–100)
ALBUMIN SERPL BCG-MCNC: 4.2 G/DL (ref 3.5–5)
ALP SERPL-CCNC: 67 U/L (ref 34–104)
ALT SERPL W P-5'-P-CCNC: 14 U/L (ref 7–52)
ANION GAP SERPL CALCULATED.3IONS-SCNC: 7 MMOL/L (ref 4–13)
AST SERPL W P-5'-P-CCNC: 20 U/L (ref 13–39)
BASOPHILS # BLD AUTO: 0.04 THOUSANDS/ÂΜL (ref 0–0.1)
BASOPHILS NFR BLD AUTO: 1 % (ref 0–1)
BILIRUB SERPL-MCNC: 0.53 MG/DL (ref 0.2–1)
BUN SERPL-MCNC: 30 MG/DL (ref 5–25)
CALCIUM SERPL-MCNC: 9.8 MG/DL (ref 8.4–10.2)
CHLORIDE SERPL-SCNC: 101 MMOL/L (ref 96–108)
CHOLEST SERPL-MCNC: 166 MG/DL (ref ?–200)
CO2 SERPL-SCNC: 32 MMOL/L (ref 21–32)
CREAT SERPL-MCNC: 0.73 MG/DL (ref 0.6–1.3)
EOSINOPHIL # BLD AUTO: 0.16 THOUSAND/ÂΜL (ref 0–0.61)
EOSINOPHIL NFR BLD AUTO: 3 % (ref 0–6)
ERYTHROCYTE [DISTWIDTH] IN BLOOD BY AUTOMATED COUNT: 13 % (ref 11.6–15.1)
EST. AVERAGE GLUCOSE BLD GHB EST-MCNC: 103 MG/DL
FERRITIN SERPL-MCNC: 214 NG/ML (ref 30–307)
FOLATE SERPL-MCNC: 4.3 NG/ML
GFR SERPL CREATININE-BSD FRML MDRD: 87 ML/MIN/1.73SQ M
GLUCOSE P FAST SERPL-MCNC: 92 MG/DL (ref 65–99)
HBA1C MFR BLD: 5.2 %
HCT VFR BLD AUTO: 42 % (ref 34.8–46.1)
HDLC SERPL-MCNC: 44 MG/DL
HGB BLD-MCNC: 14.1 G/DL (ref 11.5–15.4)
IMM GRANULOCYTES # BLD AUTO: 0.01 THOUSAND/UL (ref 0–0.2)
IMM GRANULOCYTES NFR BLD AUTO: 0 % (ref 0–2)
INSULIN SERPL-ACNC: 3.81 UIU/ML (ref 1.9–23)
IRON SATN MFR SERPL: 22 % (ref 15–50)
IRON SERPL-MCNC: 72 UG/DL (ref 50–212)
LDLC SERPL CALC-MCNC: 96 MG/DL (ref 0–100)
LYMPHOCYTES # BLD AUTO: 1.43 THOUSANDS/ÂΜL (ref 0.6–4.47)
LYMPHOCYTES NFR BLD AUTO: 26 % (ref 14–44)
MCH RBC QN AUTO: 30.5 PG (ref 26.8–34.3)
MCHC RBC AUTO-ENTMCNC: 33.6 G/DL (ref 31.4–37.4)
MCV RBC AUTO: 91 FL (ref 82–98)
MONOCYTES # BLD AUTO: 0.39 THOUSAND/ÂΜL (ref 0.17–1.22)
MONOCYTES NFR BLD AUTO: 7 % (ref 4–12)
NEUTROPHILS # BLD AUTO: 3.46 THOUSANDS/ÂΜL (ref 1.85–7.62)
NEUTS SEG NFR BLD AUTO: 63 % (ref 43–75)
NONHDLC SERPL-MCNC: 122 MG/DL
NRBC BLD AUTO-RTO: 0 /100 WBCS
PLATELET # BLD AUTO: 221 THOUSANDS/UL (ref 149–390)
PMV BLD AUTO: 10.6 FL (ref 8.9–12.7)
POTASSIUM SERPL-SCNC: 3.8 MMOL/L (ref 3.5–5.3)
PROT SERPL-MCNC: 7.4 G/DL (ref 6.4–8.4)
PTH-INTACT SERPL-MCNC: 55.4 PG/ML (ref 12–88)
RBC # BLD AUTO: 4.63 MILLION/UL (ref 3.81–5.12)
SODIUM SERPL-SCNC: 140 MMOL/L (ref 135–147)
T3FREE SERPL-MCNC: 3 PG/ML (ref 2.5–3.9)
TIBC SERPL-MCNC: 334.6 UG/DL (ref 250–450)
TRANSFERRIN SERPL-MCNC: 239 MG/DL (ref 203–362)
TRIGL SERPL-MCNC: 130 MG/DL (ref ?–150)
TSH SERPL DL<=0.05 MIU/L-ACNC: 2.05 UIU/ML (ref 0.45–4.5)
UIBC SERPL-MCNC: 263 UG/DL (ref 155–355)
VIT B12 SERPL-MCNC: 763 PG/ML (ref 180–914)
WBC # BLD AUTO: 5.49 THOUSAND/UL (ref 4.31–10.16)

## 2025-05-03 PROCEDURE — 83550 IRON BINDING TEST: CPT

## 2025-05-03 PROCEDURE — 82306 VITAMIN D 25 HYDROXY: CPT

## 2025-05-03 PROCEDURE — 83525 ASSAY OF INSULIN: CPT

## 2025-05-03 PROCEDURE — 84443 ASSAY THYROID STIM HORMONE: CPT

## 2025-05-03 PROCEDURE — 84481 FREE ASSAY (FT-3): CPT

## 2025-05-03 PROCEDURE — 85025 COMPLETE CBC W/AUTO DIFF WBC: CPT

## 2025-05-03 PROCEDURE — 36415 COLL VENOUS BLD VENIPUNCTURE: CPT

## 2025-05-03 PROCEDURE — 83036 HEMOGLOBIN GLYCOSYLATED A1C: CPT

## 2025-05-03 PROCEDURE — 82728 ASSAY OF FERRITIN: CPT

## 2025-05-03 PROCEDURE — 82607 VITAMIN B-12: CPT

## 2025-05-03 PROCEDURE — 80053 COMPREHEN METABOLIC PANEL: CPT

## 2025-05-03 PROCEDURE — 80061 LIPID PANEL: CPT

## 2025-05-03 PROCEDURE — 82746 ASSAY OF FOLIC ACID SERUM: CPT

## 2025-05-03 PROCEDURE — 83540 ASSAY OF IRON: CPT

## 2025-05-03 PROCEDURE — 83970 ASSAY OF PARATHORMONE: CPT

## 2025-05-05 ENCOUNTER — RESULTS FOLLOW-UP (OUTPATIENT)
Dept: BARIATRICS | Facility: CLINIC | Age: 65
End: 2025-05-05

## 2025-05-05 ENCOUNTER — OFFICE VISIT (OUTPATIENT)
Dept: BARIATRICS | Facility: CLINIC | Age: 65
End: 2025-05-05
Payer: COMMERCIAL

## 2025-05-05 VITALS
HEART RATE: 66 BPM | DIASTOLIC BLOOD PRESSURE: 80 MMHG | HEIGHT: 63 IN | SYSTOLIC BLOOD PRESSURE: 138 MMHG | WEIGHT: 244 LBS | OXYGEN SATURATION: 98 % | BODY MASS INDEX: 43.23 KG/M2

## 2025-05-05 DIAGNOSIS — K91.2 POSTSURGICAL MALABSORPTION: Primary | ICD-10-CM

## 2025-05-05 DIAGNOSIS — E53.8 FOLATE DEFICIENCY: ICD-10-CM

## 2025-05-05 DIAGNOSIS — E66.813 CLASS 3 SEVERE OBESITY DUE TO EXCESS CALORIES WITH SERIOUS COMORBIDITY AND BODY MASS INDEX (BMI) OF 45.0 TO 49.9 IN ADULT: ICD-10-CM

## 2025-05-05 PROCEDURE — 99215 OFFICE O/P EST HI 40 MIN: CPT | Performed by: INTERNAL MEDICINE

## 2025-05-05 RX ORDER — TIRZEPATIDE 10 MG/.5ML
10 INJECTION, SOLUTION SUBCUTANEOUS WEEKLY
Qty: 2 ML | Refills: 2 | Status: SHIPPED | OUTPATIENT
Start: 2025-05-05 | End: 2025-07-28

## 2025-05-05 RX ORDER — FOLIC ACID 0.4 MG
400 TABLET ORAL DAILY
Qty: 90 TABLET | Refills: 0 | Status: SHIPPED | OUTPATIENT
Start: 2025-05-05

## 2025-05-05 NOTE — RESULT ENCOUNTER NOTE
Please let Silvia know about her labs, thank you:    Folate is low - I am sending folic Acid Rx - repeat labs in 3 months thanks

## 2025-05-05 NOTE — ASSESSMENT & PLAN NOTE
Antiobesity Medications/Medical --  Continue 10 mg of Zepbound-patient has just started her first box.  Patient has lost 23.7% of her body weight-66 pounds  Labs indicate elevated BUN suggestive of dehydration.  We discussed how the Zepbound could affect her thirst as well.  Patient feels like she is having a harder time drinking fluids also secondary to gastric bypass and a sensation of bloating.  We discussed small frequent meals  Next office visit consider adding metformin- Metformin would be first choice given synergistic mechanism of action.   Discussed oral medication options:    Patient is currently on Cymbalta 90 mg if we consider Wellbutrin which is in a similar drug class-it would have to be a lower dose.  Could consider adding naltrexone to Cymbalta.  Phentermine would be a last option given its stimulant properties.  No contraindications to Topamax.  -Also discussed weight gaining potential of Neurontin        Nutrition:    Per surgical RD 4580-8484 /day   70 gm of protein-patient reports she is staying protein focused    Physical Activity:   Discussed progression with resistance bands;  Gets steps in at work    Sleep: -  STOP- BANG-4/8;   7 hours      Food Behaviors/Stress/pyschosocial:    Patient feels like she is having a harder time drinking fluids also secondary to gastric bypass and a sensation of bloating.  We discussed small frequent meals

## 2025-05-05 NOTE — PROGRESS NOTES
Program: [facility]    Assessment/Plan     Silvia Antonio  is a 64 y.o. female with  returns to follow up  for treatment of excess body weight and associated risk factors/co-morbidities.     Brief Summary: From chart review   -s/p OPEN Yoanna-En-Y Gastric Bypass in 1993 at [facility].     Initial: 311lbs  Weight on 8/12/2024: 310.2lbs  Cheikh: 170lbs maintained cheikh weight for about 10 years  Weight regain:+140 lbs  Target Weight :170 lbs  BMI 55 kg/m2 - Above 40- Obesity Class III      Weight on 11/18/2024: 277 lbs  BMI on 11/18/2024: 49 kg/m2 Above 40- Obesity Class III  Difference: -33 lbs    Weight on 2/10/2025 :118 kg (259 lb 6.4 oz) (-18)  BMI on  2/10/2025 : Body mass index is 45.95 kg/m². Above 40- Obesity Class III  Difference: -51 lbs    Weight on 5/5/2025 :111 kg (244 lb)(-15)  BMI on  5/5/2025 : Body mass index is 43.22 kg/m². Above 40- Obesity Class III  Difference: -66 lbs                    Class 3 severe obesity due to excess calories with serious comorbidity and body mass index (BMI) of 45.0 to 49.9 in adult (HCC)  Antiobesity Medications/Medical --  Continue 10 mg of Zepbound-patient has just started her first box.  Patient has lost 23.7% of her body weight-66 pounds  Labs indicate elevated BUN suggestive of dehydration.  We discussed how the Zepbound could affect her thirst as well.  Patient feels like she is having a harder time drinking fluids also secondary to gastric bypass and a sensation of bloating.  We discussed small frequent meals  Next office visit consider adding metformin- Metformin would be first choice given synergistic mechanism of action.   Discussed oral medication options:    Patient is currently on Cymbalta 90 mg if we consider Wellbutrin which is in a similar drug class-it would have to be a lower dose.  Could consider adding naltrexone to Cymbalta.  Phentermine would be a last option given its stimulant properties.  No contraindications to Topamax.  -Also  "discussed weight gaining potential of Neurontin        Nutrition:    Per surgical RD [id number] /day   70 gm of protein-patient reports she is staying protein focused    Physical Activity:   Discussed progression with resistance bands;  Gets steps in at work    Sleep: -  STOP- BANG-;   7 hours      Food Behaviors/Stress/pyschosocial:    Patient feels like she is having a harder time drinking fluids also secondary to gastric bypass and a sensation of bloating.  We discussed small frequent meals         Most recent notes and records were reviewed.         Return visit:  2-3 months     Nutrition   Do not skip meals. Avoid sugary beverages. At least 64oz of water daily.    Behavioral/Stress   Food log via misty or provided paper log (misty options include www.Clear Books.com, sparkpeople.com, loseit.com, calorieking.com, Smart Education). Encouraged mindful eating    Physical Activity  Increase physical activity by 10 minutes daily. Gradually increase physical activity to a goal of 5 days per week for 30 minutes of MODERATE intensity ( ( should be able to pass the \"talk test\" but should not be able to sing.  target 150-300 minutes  PLUS 2-3 days per week of FULL BODY resistance training. Progression will be addressed at follow up visits. Encouraged planning regarding establishing physical activity routine    Sleep  Provided sleep hygiene counseling and importance of having adequate sleep duration          Silvia was seen today for follow-up.    Diagnoses and all orders for this visit:    Class 3 severe obesity due to excess calories with serious comorbidity and body mass index (BMI) of 45.0 to 49.9 in adult  -     tirzepatide (Zepbound) 10 mg/0.5 mL auto-injector; Inject 0.5 mL (10 mg total) under the skin once a week                    Total time spent reviewing chart, interviewing patient, examining patient, discussing plan, answering all questions, and documentin minutes with >50% face-to-face time with the " patient.            Weight trajectory     Wt Readings from Last 20 Encounters:   05/05/25 111 kg (244 lb)   03/14/25 117 kg (258 lb)   02/13/25 119 kg (263 lb 3.2 oz)   02/10/25 118 kg (259 lb 6.4 oz)   12/24/24 121 kg (267 lb)   11/18/24 126 kg (277 lb 9.6 oz)   10/28/24 128 kg (282 lb)   08/12/24 (!) 140 kg (309 lb)   08/12/24 (!) 140 kg (309 lb)   08/12/24 (!) 140 kg (309 lb)   07/31/24 (!) 141 kg (310 lb 3.2 oz)   07/12/24 (!) 141 kg (310 lb 3.2 oz)   07/02/24 (!) 143 kg (315 lb)   06/19/24 (!) 140 kg (308 lb)   06/11/24 (!) 142 kg (313 lb)   03/14/24 (!) 142 kg (313 lb)   03/05/24 (!) 142 kg (313 lb)   02/14/24 136 kg (300 lb)   01/31/24 136 kg (300 lb)   01/12/24 136 kg (300 lb)           Lifestyle questionnaire     Met with surgical RD today [id number] average 1500 , 70-gm of protein   Diet recall:  B - 32oz iced coffee with protein shake scrambled eggs whipple sometimes yogurt or oatmeal  Snack - varies - special K bar or oatmeal or yogurt or cookies  L -turkey chicken cottage  cheese bowls  D - chicken or sausage or pork sweet potato salad or vegetable occasional pizza  HS - stopped snacking   Eating out vs cooking at home-1-2 times a week    Beverages  Water-- 64    Caffeine/tea--32     SSB- no    Alcohol: stopped beer  Smoking: no  Drug use: no    Physical Activity --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 Walking is unsteady she has back surgery. Has treadmill at home , wants to get into a routine. Total gym in [place]   Sleep -- STOP- BANG-4/8;   7 hours    Occupation-Works [employer].   Psycho social- Lives with her  - very high stress with him. Has 2 [employer] (male and female).         Anti obesity Medications/ Medical---    Weight loss medication and dose: Zepbound  Date initiated: August 2024        Colonoscopy: UTD  Mammogram: UTD                        Objective         The following portions of  "the patient's history were reviewed and updated as appropriate: allergies, current medications, past family history, past medical history, past social history, past surgical history, and problem list.      /80   Pulse 66   Ht 5' 3\" (1.6 m)   Wt 111 kg (244 lb)   SpO2 98%   BMI 43.22 kg/m²             Review of Systems   Constitutional:  Negative for fatigue.   HENT:  Negative for sore throat.    Respiratory:  Negative for cough and shortness of breath.    Cardiovascular:  Negative for chest pain, palpitations and leg swelling.   Gastrointestinal:  Negative for abdominal pain, constipation, diarrhea and nausea.   Genitourinary:  Negative for dysuria.   Musculoskeletal:  Negative for arthralgias and back pain.   Skin:  Negative for rash.   Neurological:  Negative for headaches.   Psychiatric/Behavioral:  Negative for dysphoric mood. The patient is not nervous/anxious.          Physical Exam  Vitals and nursing note reviewed.   Constitutional:       Appearance: Normal appearance.   HENT:      Head: Normocephalic.   Pulmonary:      Effort: Pulmonary effort is normal.   Neurological:      General: No focal deficit present.      Mental Status: She is alert and oriented to person, place, and time.   Psychiatric:         Mood and Affect: Mood normal.         Behavior: Behavior normal.         Thought Content: Thought content normal.         Judgment: Judgment normal.          Current medications       Current Outpatient Medications:     bisoprolol-hydrochlorothiazide (ZIAC) 10-6.25 MG per tablet, Take 1 tablet by mouth daily, Disp: 90 tablet, Rfl: 1    Cholecalciferol (Vitamin D3) 125 MCG (5000 UT) CHEW, Chew 1,000 Units daily, Disp: , Rfl:     Cyanocobalamin (VITAMIN B 12 PO), Take by mouth, Disp: , Rfl:     DULoxetine (CYMBALTA) 30 mg delayed release capsule, Take 1 capsule (30 mg total) by mouth daily, Disp: 90 capsule, Rfl: 1    DULoxetine (CYMBALTA) 60 mg delayed release capsule, Take 1 capsule (60 mg total) " by mouth daily, Disp: 90 capsule, Rfl: 1    folic acid (FOLVITE) 400 mcg tablet, Take 1 tablet (400 mcg total) by mouth daily, Disp: 90 tablet, Rfl: 0    gabapentin (NEURONTIN) 400 mg capsule, Take 1 capsule (400 mg total) by mouth 3 (three) times a day, Disp: 300 capsule, Rfl: 0    MAGNESIUM GLYCINATE PO, Take 1 tablet by mouth in the morning 500 MG, Disp: , Rfl:     tirzepatide (Zepbound) 10 mg/0.5 mL auto-injector, Inject 0.5 mL (10 mg total) under the skin once a week, Disp: 2 mL, Rfl: 2    zolpidem (AMBIEN) 5 mg tablet, Take 1 tablet (5 mg total) by mouth daily at bedtime as needed for sleep, Disp: 90 tablet, Rfl: 1         Medication considerations/contraindications     -Patient denies personal history of pancreatitis. Patient also denies personal and family history of medullary thyroid cancer, and multiple endocrine neoplasia type 2 (MEN 2 tumor). -Patient denies any history of kidney stones, seizures, or glaucoma, diabetic retinopathy, gall bladder disease, gastroparesis, hyperthyroidism.  -Denies Hx of CAD, PAD, palpitations, arrhythmia, uncontrolled hypertension  -Denies uncontrolled anxiety or depression, suicidal behavior or thinking , insomnia or sleep disturbance.         Labs     Most recent labs reviewed   Lab Results   Component Value Date    SODIUM 140 05/03/2025    K 3.8 05/03/2025     05/03/2025    CO2 32 05/03/2025    AGAP 7 05/03/2025    BUN 30 (H) 05/03/2025    CREATININE 0.73 05/03/2025    GLUC 121 (H) 04/18/2022    GLUF 92 05/03/2025    CALCIUM 9.8 05/03/2025    AST 20 05/03/2025    ALT 14 05/03/2025    ALKPHOS 67 05/03/2025    TP 7.4 05/03/2025    TBILI 0.53 05/03/2025    EGFR 87 05/03/2025     Lab Results   Component Value Date    HGBA1C 5.2 05/03/2025     Lab Results   Component Value Date    LOH5HZKJTEYA 2.049 05/03/2025     Lab Results   Component Value Date    CHOLESTEROL 166 05/03/2025     Lab Results   Component Value Date    HDL 44 (L) 05/03/2025     Lab Results   Component  "Value Date    TRIG 130 05/03/2025     Lab Results   Component Value Date    LDLCALC 96 05/03/2025     No results found for: \"VITD\"  No components found for: \"FASTINS\"   "

## 2025-05-14 ENCOUNTER — OFFICE VISIT (OUTPATIENT)
Dept: OBGYN CLINIC | Facility: CLINIC | Age: 65
End: 2025-05-14
Payer: COMMERCIAL

## 2025-05-14 ENCOUNTER — HOSPITAL ENCOUNTER (OUTPATIENT)
Dept: RADIOLOGY | Facility: HOSPITAL | Age: 65
Discharge: HOME | End: 2025-05-14
Attending: ORTHOPAEDIC SURGERY
Payer: COMMERCIAL

## 2025-05-14 VITALS — BODY MASS INDEX: 43.94 KG/M2 | WEIGHT: 248 LBS | HEIGHT: 63 IN

## 2025-05-14 DIAGNOSIS — M17.12 PRIMARY OSTEOARTHRITIS OF LEFT KNEE: Primary | ICD-10-CM

## 2025-05-14 DIAGNOSIS — E66.813 CLASS 3 SEVERE OBESITY DUE TO EXCESS CALORIES WITH SERIOUS COMORBIDITY AND BODY MASS INDEX (BMI) OF 40.0 TO 44.9 IN ADULT: ICD-10-CM

## 2025-05-14 DIAGNOSIS — M25.562 LEFT KNEE PAIN, UNSPECIFIED CHRONICITY: ICD-10-CM

## 2025-05-14 PROCEDURE — 99214 OFFICE O/P EST MOD 30 MIN: CPT | Performed by: ORTHOPAEDIC SURGERY

## 2025-05-14 PROCEDURE — 20610 DRAIN/INJ JOINT/BURSA W/O US: CPT | Performed by: ORTHOPAEDIC SURGERY

## 2025-05-14 PROCEDURE — 73562 X-RAY EXAM OF KNEE 3: CPT

## 2025-05-14 RX ORDER — BUPIVACAINE HYDROCHLORIDE 2.5 MG/ML
1 INJECTION, SOLUTION INFILTRATION; PERINEURAL
Status: COMPLETED | OUTPATIENT
Start: 2025-05-14 | End: 2025-05-14

## 2025-05-14 RX ORDER — METHYLPREDNISOLONE ACETATE 40 MG/ML
2 INJECTION, SUSPENSION INTRA-ARTICULAR; INTRALESIONAL; INTRAMUSCULAR; SOFT TISSUE
Status: COMPLETED | OUTPATIENT
Start: 2025-05-14 | End: 2025-05-14

## 2025-05-14 RX ORDER — CELECOXIB 200 MG/1
200 CAPSULE ORAL 2 TIMES DAILY
Qty: 30 CAPSULE | Refills: 2 | Status: SHIPPED | OUTPATIENT
Start: 2025-05-14

## 2025-05-14 RX ORDER — KETOROLAC TROMETHAMINE 30 MG/ML
30 INJECTION, SOLUTION INTRAMUSCULAR; INTRAVENOUS
Status: COMPLETED | OUTPATIENT
Start: 2025-05-14 | End: 2025-05-14

## 2025-05-14 RX ADMIN — METHYLPREDNISOLONE ACETATE 2 ML: 40 INJECTION, SUSPENSION INTRA-ARTICULAR; INTRALESIONAL; INTRAMUSCULAR; SOFT TISSUE at 14:30

## 2025-05-14 RX ADMIN — BUPIVACAINE HYDROCHLORIDE 1 ML: 2.5 INJECTION, SOLUTION INFILTRATION; PERINEURAL at 14:30

## 2025-05-14 RX ADMIN — KETOROLAC TROMETHAMINE 30 MG: 30 INJECTION, SOLUTION INTRAMUSCULAR; INTRAVENOUS at 14:30

## 2025-05-14 NOTE — PATIENT INSTRUCTIONS
Hamstring stretching:                                     Perform effective stretching exercises:   3 sets of 10 repetitions (rep)  Hold each rep for 20-30 seconds        .nwavmo

## 2025-05-14 NOTE — PROGRESS NOTES
Name: Silvia Antonio      : 1960      MRN: [id number]  Encounter Provider: Stephany Hurtado MD  Encounter Date: 2025   Encounter department: [facility]  :  Assessment & Plan  Primary osteoarthritis of left knee    Orders:    Ambulatory referral to Physical Therapy; Future    celecoxib (CeleBREX) 200 mg capsule; Take 1 capsule (200 mg total) by mouth 2 (two) times a day    Large joint arthrocentesis: L knee  Patient has right knee pain due to severe osteoarthritis  Weightbearing as tolerated  X-ray of the left knee was obtained and reviewed in the office today which showed tricompartmental joint space narrowing worse in the medial compartment and patellofemoral joint with osteophyte formation  Discussed with patient conservative treatments which include steroid injections, viscosupplementation injections, medications, therapy and topical creams for pain relief and a surgical option a total knee replacement.  Patient at this time would like to start with conservative treatments  Patient received a left knee steroid injection in the office today  Therapy order was placed for VMO, quadricep, hamstring strengthening exercises and hamstring stretching exercises,  Provided patient with VMO strengthening and hamstring strengthening exercises   Prescribed patient Celebrex 200 mg for pain relief  Continue working on weight loss.  Patient is working with weight management and is currently on Zepbound and has lost 70 pounds  Left knee pain, unspecified chronicity    Orders:    XR knee 3 vw left non injury; Future    celecoxib (CeleBREX) 200 mg capsule; Take 1 capsule (200 mg total) by mouth 2 (two) times a day    Class 3 severe obesity due to excess calories with serious comorbidity and body mass index (BMI) of 40.0 to 44.9 in adult             Follow up visit :  14 weeks Left knee reevaluation         The above stated was discussed in layman's terms and the patient  expressed understanding.  All questions were answered to the patient's satisfaction.       Subjective:   Silvia Antonio is a 64 y.o. female who presents today evaluation for her left knee.  Patient states she has been having left knee pain for the last 6 months, denies any injuries.  Patient has no history of having treatments on her left knee.  She states she is having occasional locking over the anterior aspect of the knee mostly the kneecap region.  She states she also is having pain in the posterolateral aspect of the left knee.  Her pain is worse with walking, going up and down steps.  Has been taking over-the-counter ibuprofen for pain relief.  Patient has been working on weight loss has lost 70 pounds working with weight management and is currently on Zepbound.  Patient also has been used Voltaren gel for pain relief.      Review of systems negative unless otherwise specified in HPI    Past Medical History:   Diagnosis Date    Anemia     Anxiety     Arthritis     Fibromyalgia     Fibromyalgia, primary 2000    Headache(784.0)     High blood pressure     Hypertension     Obesity     Panic attacks     Shingles        Past Surgical History:   Procedure Laterality Date    BACK SURGERY  4/2022    CHOLECYSTECTOMY      COLONOSCOPY      EXPLORATORY LAPAROTOMY      HAND SURGERY  2016    HERNIA REPAIR      KNEE SURGERY      NECK SURGERY  2000    CA TENDON SHEATH INCISION Right 06/11/2024    Procedure: RELEASE TRIGGER FINGER RIGHT INDEX, LONG & RING;  Surgeon: Irina Clay MD;  Location: [facility];  Service: Orthopedics    HERIBERTO-EN-Y PROCEDURE      SHOULDER OPEN ROTATOR CUFF REPAIR      SHOULDER SURGERY  2003    SLEEVE GASTROPLASTY  1994    SPINAL FUSION      STOMACH SURGERY      TUBAL LIGATION         Family History   Problem Relation Age of Onset    Hypertension Mother     COPD Mother     Scoliosis Mother     Heart attack Father         late 50s     Arthritis Father     Pulmonary embolism Father     Vascular  Disease Father     No Known Problems Sister     Hypertension Brother     Alcohol abuse Brother     No Known Problems Maternal Grandmother     No Known Problems Paternal Grandmother     No Known Problems Maternal Aunt     No Known Problems Paternal Aunt     Breast cancer Neg Hx        Social History     Occupational History    Not on file   Tobacco Use    Smoking status: Former     Current packs/day: 0.00     Average packs/day: 0.5 packs/day for 37.4 years (18.7 ttl pk-yrs)     Types: Cigarettes     Start date: 1980     Quit date: 2017     Years since quittin.9    Smokeless tobacco: Never   Vaping Use    Vaping status: Never Used   Substance and Sexual Activity    Alcohol use: Yes     Alcohol/week: 3.0 standard drinks of alcohol     Types: 3 Cans of beer per week     Comment: 3 per week    Drug use: Never     Comment: Illicit drugs:   no  - As per [place]     Sexual activity: Yes     Partners: Male     Birth control/protection: Female Sterilization, None       Current Medications[1]    Allergies[2]       There were no vitals filed for this visit.  Body mass index is 43.93 kg/m².  Wt Readings from Last 3 Encounters:   25 112 kg (248 lb)   25 111 kg (244 lb)   25 117 kg (258 lb)       Objective:            Physical Exam  Physical Exam:      General Appearance:    Alert, cooperative, no distress, appears stated age   Head:    Normocephalic, without obvious abnormality, atraumatic   Eyes:    conjunctiva/corneas clear, both eyes         Nose:   Nares normal, septum midline, no drainage    Throat:   Lips normal; teeth and gums normal   Neck:    symmetrical, trachea midline, ;     thyroid:  no enlargement/   Back:     Symmetric, no curvature, ROM normal   Lungs:   No audible wheezing or labored breathing   Chest Wall:    No tenderness or deformity    Heart:    Regular rate and rhythm                         Pulses:   2+ and symmetric all extremities   Skin:   Skin color, texture, turgor normal,  "no rashes or lesions   Neurologic:   normal strength, sensation and reflexes     throughout                       Ortho Exam  Left  Knee  Skin intact  No erythema or open wounds  Mild effusion  Tenderness palpation of medial joint line  No lateral joint line tenderness  Near full extension  Full flexion  Patellar grind test -   TTP media and lateral aspect of the hamstring   Stable to varus and valgus stress  Negative posterior drawer  Negative Lachman test  Neurovascular intact distally       Diagnostics, reviewed and taken today if performed as documented:      The attending physician has personally reviewed the pertinent films in PACS and interpretation is as follows:x-ray left knee demonstrates tricompartmental joint space narrowing worse in the medial compartment and patellofemoral joint with osteophyte formation       Procedures, if performed today:    Large joint arthrocentesis: L knee    Performed by: Stephany Hurtado MD  Authorized by: Stephany Hurtado MD    [place] Protocol:  procedure performed by consultantConsent: Verbal consent obtained  Risks and benefits: risks, benefits and alternatives were discussed  Consent given by: patient  Time out: Immediately prior to procedure a \"time out\" was called to verify the correct patient, procedure, equipment, support staff and site/side marked as required.  Timeout called at: 5/14/2025 3:15 PM.  Patient understanding: patient states understanding of the procedure being performed  Site marked: the operative site was marked  Supporting Documentation  Indications: pain     Is this a Visco injection? NoProcedure Details  Location: knee - L knee  Preparation: Patient was prepped and draped in the usual sterile fashion  Needle size: 25 G  Approach: lateral  Medications administered: 1 mL bupivacaine 0.25 %; 2 mL methylPREDNISolone acetate 40 mg/mL; 30 mg ketorolac 30 mg/mL    Patient tolerance: patient tolerated the procedure well with no immediate " "complications  Dressing:  Sterile dressing applied              Scribe Attestation      I,:   am acting as a scribe while in the presence of the attending physician.:       I,:   personally performed the services described in this documentation    as scribed in my presence.:               Portions of the record may have been created with voice recognition software.  Occasional wrong word or \"sound a like\" substitutions may have occurred due to the inherent limitations of voice recognition software.  Read the chart carefully and recognize, using context, where substitutions have occurred.         [1]   Current Outpatient Medications:     bisoprolol-hydrochlorothiazide (ZIAC) 10-6.25 MG per tablet, Take 1 tablet by mouth daily, Disp: 90 tablet, Rfl: 1    Cholecalciferol (Vitamin D3) 125 MCG (5000 UT) CHEW, Chew 1,000 Units in the morning., Disp: , Rfl:     Cyanocobalamin (VITAMIN B 12 PO), Take by mouth, Disp: , Rfl:     DULoxetine (CYMBALTA) 30 mg delayed release capsule, Take 1 capsule (30 mg total) by mouth daily, Disp: 90 capsule, Rfl: 1    DULoxetine (CYMBALTA) 60 mg delayed release capsule, Take 1 capsule (60 mg total) by mouth daily, Disp: 90 capsule, Rfl: 1    folic acid (FOLVITE) 400 mcg tablet, Take 1 tablet (400 mcg total) by mouth daily, Disp: 90 tablet, Rfl: 0    gabapentin (NEURONTIN) 400 mg capsule, Take 1 capsule (400 mg total) by mouth 3 (three) times a day, Disp: 300 capsule, Rfl: 0    MAGNESIUM GLYCINATE PO, Take 1 tablet by mouth in the morning 500 MG, Disp: , Rfl:     tirzepatide (Zepbound) 10 mg/0.5 mL auto-injector, Inject 0.5 mL (10 mg total) under the skin once a week, Disp: 2 mL, Rfl: 2    zolpidem (AMBIEN) 5 mg tablet, Take 1 tablet (5 mg total) by mouth daily at bedtime as needed for sleep, Disp: 90 tablet, Rfl: 1  [2] No Known Allergies    "

## 2025-05-29 ENCOUNTER — HOSPITAL ENCOUNTER (OUTPATIENT)
Dept: NEUROLOGY | Facility: HOSPITAL | Age: 65
Discharge: HOME | End: 2025-05-29
Payer: COMMERCIAL

## 2025-05-29 DIAGNOSIS — G62.9 NEUROPATHY: ICD-10-CM

## 2025-05-29 PROBLEM — M54.16 RADICULOPATHY, LUMBAR REGION: Status: ACTIVE | Noted: 2025-05-29

## 2025-05-29 PROBLEM — G62.89 PERIPHERAL MOTOR NEUROPATHY: Status: ACTIVE | Noted: 2023-06-30

## 2025-05-29 PROCEDURE — 95886 MUSC TEST DONE W/N TEST COMP: CPT | Performed by: PSYCHIATRY & NEUROLOGY

## 2025-05-29 PROCEDURE — 95910 NRV CNDJ TEST 7-8 STUDIES: CPT | Performed by: PSYCHIATRY & NEUROLOGY

## 2025-06-13 ENCOUNTER — OFFICE VISIT (OUTPATIENT)
Dept: FAMILY MEDICINE CLINIC | Facility: CLINIC | Age: 65
End: 2025-06-13
Payer: COMMERCIAL

## 2025-06-13 VITALS
HEART RATE: 62 BPM | HEIGHT: 63 IN | DIASTOLIC BLOOD PRESSURE: 76 MMHG | SYSTOLIC BLOOD PRESSURE: 124 MMHG | OXYGEN SATURATION: 95 % | BODY MASS INDEX: 41.82 KG/M2 | WEIGHT: 236 LBS | TEMPERATURE: 97.2 F

## 2025-06-13 DIAGNOSIS — F51.01 PRIMARY INSOMNIA: ICD-10-CM

## 2025-06-13 DIAGNOSIS — I10 ESSENTIAL HYPERTENSION: ICD-10-CM

## 2025-06-13 DIAGNOSIS — M79.7 FIBROMYALGIA, PRIMARY: ICD-10-CM

## 2025-06-13 DIAGNOSIS — Z12.31 ENCOUNTER FOR SCREENING MAMMOGRAM FOR BREAST CANCER: ICD-10-CM

## 2025-06-13 DIAGNOSIS — M54.16 RADICULOPATHY, LUMBAR REGION: ICD-10-CM

## 2025-06-13 DIAGNOSIS — G25.81 RESTLESS LEG SYNDROME: ICD-10-CM

## 2025-06-13 DIAGNOSIS — Z80.3 FAMILY HISTORY OF BREAST CANCER: ICD-10-CM

## 2025-06-13 DIAGNOSIS — E66.813 CLASS 3 SEVERE OBESITY DUE TO EXCESS CALORIES WITH SERIOUS COMORBIDITY AND BODY MASS INDEX (BMI) OF 40.0 TO 44.9 IN ADULT: Primary | ICD-10-CM

## 2025-06-13 DIAGNOSIS — F41.9 ANXIETY: ICD-10-CM

## 2025-06-13 PROCEDURE — 99214 OFFICE O/P EST MOD 30 MIN: CPT | Performed by: FAMILY MEDICINE

## 2025-06-13 RX ORDER — LORAZEPAM 1 MG/1
TABLET ORAL
Qty: 1 TABLET | Refills: 0 | Status: SHIPPED | OUTPATIENT
Start: 2025-06-13

## 2025-06-13 NOTE — PROGRESS NOTES
Name: Silvia Antonio      : 1960      MRN: [id number]  Encounter Provider: Rick Davies MD  Encounter Date: 2025   Encounter department: [facility]  :  Assessment & Plan  Class 3 severe obesity due to excess calories with serious comorbidity and body mass index (BMI) of 40.0 to 44.9 in adult  Patient to increase exercise and partake of a diet with less calories to promote  weight loss        Essential hypertension  Patient is stable with current anti-hypertensive medicine and continue to follow a low sodium diet and take current medication. All questions about this condition were answered today.        Fibromyalgia, primary  Patient is stable  and will continue present plan of care and reassess at next routine visit. All questions about this problem from patient were answered today.        Primary insomnia  Discussed with patient use of sedative  medications and good  sleep hygiene to maximize treatment for this problem. Pt  to use sedatives only prior to sleep and cautioned them about usage at any other time. All patient questions about this problem answered today.        Restless leg syndrome  Patient is stable  and will continue present plan of care and reassess at next routine visit. All questions about this problem from patient were answered today.        Radiculopathy, lumbar region    Orders:  •  MRI lumbar spine wo contrast; Future    Anxiety    Orders:  •  LORazepam (ATIVAN) 1 mg tablet; Please take 1 hour prior to MRI for anxiety    Encounter for screening mammogram for breast cancer    Orders:  •  Mammo screening bilateral w 3d and cad; Future    Family history of breast cancer                History of Present Illness   64-year-old female here today for checkup on multimedical problems.  Patient with history of fibromyalgia depression anxiety also with neuropathy in her lower extremities history of low back pain requiring laminectomy and is doing fairly well.   Patient received an EMG of her legs which show she has is pretty significant neuropathy in both of her legs right being worse than the left.  She has had a history of having back surgery we will see about getting an MRI of her back to further evaluate her for any radiculopathy symptoms.  Patient states there is a regular neck and radiculopathy she has been experiencing and neuropathy has happened after her surgery and she did not have it before the surgery.  Patient also had a problem with obesity and she is lost over 70 pounds with pound.  She is seeing the bariatric people for that and she is doing very well with that.  Will see patient back after MRIs done she does require some Ativan as some sedation for her MRI I have talked to her about she needs to refrain from driving when she takes Ativan and she is aware of that.      Review of Systems   Constitutional:  Negative for activity change, appetite change, fatigue and fever.   HENT:  Negative for congestion, ear pain, postnasal drip, rhinorrhea, sinus pressure, sinus pain, sneezing and sore throat.    Eyes:  Negative for pain and redness.   Respiratory:  Negative for apnea, cough, chest tightness, shortness of breath and wheezing.    Cardiovascular:  Negative for chest pain, palpitations and leg swelling.   Gastrointestinal:  Negative for abdominal pain, constipation, diarrhea, nausea and vomiting.   Endocrine: Negative for cold intolerance and heat intolerance.   Genitourinary:  Negative for difficulty urinating, dysuria, frequency, hematuria and urgency.   Musculoskeletal:  Negative for arthralgias, back pain, gait problem and myalgias.   Skin:  Negative for rash.   Neurological:  Positive for numbness. Negative for dizziness, speech difficulty, weakness and headaches.   Hematological:  Does not bruise/bleed easily.   Psychiatric/Behavioral:  Negative for agitation, confusion and hallucinations.        Objective   /76 (BP Location: Left arm, Patient  "Position: Sitting, Cuff Size: Large)   Pulse 62   Temp (!) 97.2 °F (36.2 °C) (Skin)   Ht 5' 3\" (1.6 m)   Wt 107 kg (236 lb)   SpO2 95%   BMI 41.81 kg/m²      Physical Exam  Constitutional:       Appearance: Normal appearance. She is not ill-appearing.   HENT:      Head: Normocephalic and atraumatic.      Right Ear: Tympanic membrane normal.      Left Ear: Tympanic membrane normal.      Nose: Nose normal.      Mouth/Throat:      Mouth: Mucous membranes are moist.     Eyes:      Extraocular Movements: Extraocular movements intact.      Conjunctiva/sclera: Conjunctivae normal.      Pupils: Pupils are equal, round, and reactive to light.       Cardiovascular:      Rate and Rhythm: Normal rate and regular rhythm.   Pulmonary:      Effort: Pulmonary effort is normal. No respiratory distress.      Breath sounds: Normal breath sounds. No wheezing.   Abdominal:      General: Bowel sounds are normal.      Palpations: Abdomen is soft.      Tenderness: There is no abdominal tenderness.     Musculoskeletal:         General: No tenderness. Normal range of motion.      Cervical back: Normal range of motion and neck supple.      Right lower leg: No edema.      Left lower leg: No edema.     Skin:     General: Skin is warm and dry.     Neurological:      Mental Status: She is alert and oriented to person, place, and time.      Sensory: Sensory deficit present.     Psychiatric:         Mood and Affect: Mood normal.         Behavior: Behavior normal.         Thought Content: Thought content normal.         Judgment: Judgment normal.         "

## 2025-06-19 DIAGNOSIS — M17.12 PRIMARY OSTEOARTHRITIS OF LEFT KNEE: ICD-10-CM

## 2025-06-19 DIAGNOSIS — M25.562 LEFT KNEE PAIN, UNSPECIFIED CHRONICITY: ICD-10-CM

## 2025-06-20 RX ORDER — CELECOXIB 200 MG/1
200 CAPSULE ORAL 2 TIMES DAILY
Qty: 30 CAPSULE | Refills: 0 | Status: SHIPPED | OUTPATIENT
Start: 2025-06-20

## 2025-07-02 ENCOUNTER — HOSPITAL ENCOUNTER (OUTPATIENT)
Dept: MRI IMAGING | Facility: HOSPITAL | Age: 65
Discharge: HOME | End: 2025-07-02
Attending: FAMILY MEDICINE
Payer: COMMERCIAL

## 2025-07-02 DIAGNOSIS — M54.16 RADICULOPATHY, LUMBAR REGION: ICD-10-CM

## 2025-07-02 PROCEDURE — 72148 MRI LUMBAR SPINE W/O DYE: CPT

## 2025-07-08 ENCOUNTER — TELEPHONE (OUTPATIENT)
Age: 65
End: 2025-07-08

## 2025-07-08 NOTE — TELEPHONE ENCOUNTER
Patient is looking for results of her MRI lumbar spine. She would like to know how to proceed. Please, advise.

## 2025-07-08 NOTE — TELEPHONE ENCOUNTER
October 29, 2022     Patient: Shoshana Livingston  YOB: 2020  Date of Visit: 10/27/2022      To Whom it May Concern:    Sarah Elam is under my professional care  Chloe was seen in my office on 10/27/2022  Deisykatie Denisse may return to school on 10/28/22   If you have any questions or concerns, please don't hesitate to call           Sincerely,          Sandy Martinez MD        CC: No Recipients Pt called in for MRI results from 7/2/2025.  Please review and advise  Thanks.    Left detailed message on vm per communication consent that provider is ooo today and will return tomorrow.

## 2025-07-13 NOTE — PROGRESS NOTES
Name: Silvia Antonio      : 1960      MRN: [id number]  Encounter Provider: Rick Davies MD  Encounter Date: 2025   Encounter department: [facility]  :  Assessment & Plan  Chronic bilateral low back pain without sciatica  Patient is stable  and will continue present plan of care and reassess at next routine visit. All questions about this problem from patient were answered today.   Orders:  •  Ambulatory referral to Spine & Pain Management; Future    Anxiety  Patient to continue utilizing medical therapy as well as counseling sources as applicable to condition. If suicidal thought or fear of suicide attempt, to call 911 and seek help immediately. Medications and therapy reviewed today and all patient  questions answered today.        Class 3 severe obesity due to excess calories with serious comorbidity and body mass index (BMI) of 40.0 to 44.9 in adult  Patient to increase exercise and partake of a diet with less calories to promote  weight loss        Essential hypertension  Patient is stable with current anti-hypertensive medicine and continue to follow a low sodium diet and take current medication. All questions about this condition were answered today.        Fibromyalgia, primary  Patient is stable  and will continue present plan of care and reassess at next routine visit. All questions about this problem from patient were answered today.        Restless leg syndrome  Patient is stable  and will continue present plan of care and reassess at next routine visit. All questions about this problem from patient were answered today.               History of Present Illness   64-year-old female today for checkup for recent MRI she had of her low back.  The laminectomy site looks as though it is unremarkable but she does have some mild spinal stenosis above and below that region and she still having symptomatic complaints of neuropathy in actually both of her feet.  Patient  "is a nondiabetic.  Patient also with a history of some hypertension some anxiety and obesity and is losing weight at a pretty considerable rate.  Patient is down to 233 from 311 and is currently on 10 mg of Zepbound and doing quite well.  Discussed with patient that weight loss is only going to have a compounding positive effect on the problems with she is having with her back.  This time we are going to see about referring her to see pain management for further evaluation and control of the back discomfort and neuropathy she is having in her feet.      Review of Systems    Objective   /78 (BP Location: Left arm, Patient Position: Sitting, Cuff Size: Large)   Pulse 63   Temp (!) 96.8 °F (36 °C) (Skin)   Ht 5' 3\" (1.6 m)   Wt 106 kg (233 lb)   SpO2 98%   BMI 41.27 kg/m²      Physical Exam    "

## 2025-07-13 NOTE — ASSESSMENT & PLAN NOTE
Patient is stable  and will continue present plan of care and reassess at next routine visit. All questions about this problem from patient were answered today.   Orders:  •  Ambulatory referral to Spine & Pain Management; Future

## 2025-07-14 ENCOUNTER — OFFICE VISIT (OUTPATIENT)
Dept: FAMILY MEDICINE CLINIC | Facility: CLINIC | Age: 65
End: 2025-07-14
Payer: COMMERCIAL

## 2025-07-14 VITALS
TEMPERATURE: 96.8 F | HEIGHT: 63 IN | BODY MASS INDEX: 41.29 KG/M2 | OXYGEN SATURATION: 98 % | DIASTOLIC BLOOD PRESSURE: 78 MMHG | WEIGHT: 233 LBS | SYSTOLIC BLOOD PRESSURE: 124 MMHG | HEART RATE: 63 BPM

## 2025-07-14 DIAGNOSIS — I10 ESSENTIAL HYPERTENSION: ICD-10-CM

## 2025-07-14 DIAGNOSIS — F41.9 ANXIETY: ICD-10-CM

## 2025-07-14 DIAGNOSIS — G25.81 RESTLESS LEG SYNDROME: ICD-10-CM

## 2025-07-14 DIAGNOSIS — E66.813 CLASS 3 SEVERE OBESITY DUE TO EXCESS CALORIES WITH SERIOUS COMORBIDITY AND BODY MASS INDEX (BMI) OF 40.0 TO 44.9 IN ADULT: ICD-10-CM

## 2025-07-14 DIAGNOSIS — G89.29 CHRONIC BILATERAL LOW BACK PAIN WITHOUT SCIATICA: Primary | ICD-10-CM

## 2025-07-14 DIAGNOSIS — M54.50 CHRONIC BILATERAL LOW BACK PAIN WITHOUT SCIATICA: Primary | ICD-10-CM

## 2025-07-14 DIAGNOSIS — M79.7 FIBROMYALGIA, PRIMARY: ICD-10-CM

## 2025-07-14 PROCEDURE — 99214 OFFICE O/P EST MOD 30 MIN: CPT | Performed by: FAMILY MEDICINE

## 2025-07-19 DIAGNOSIS — M79.7 FIBROMYALGIA, PRIMARY: ICD-10-CM

## 2025-07-20 RX ORDER — GABAPENTIN 400 MG/1
400 CAPSULE ORAL 3 TIMES DAILY
Qty: 270 CAPSULE | Refills: 1 | Status: SHIPPED | OUTPATIENT
Start: 2025-07-20

## 2025-08-05 ENCOUNTER — TELEPHONE (OUTPATIENT)
Age: 65
End: 2025-08-05

## 2025-08-05 DIAGNOSIS — E66.813 CLASS 3 SEVERE OBESITY DUE TO EXCESS CALORIES WITH SERIOUS COMORBIDITY AND BODY MASS INDEX (BMI) OF 45.0 TO 49.9 IN ADULT: Primary | ICD-10-CM

## 2025-08-05 RX ORDER — TIRZEPATIDE 10 MG/.5ML
10 INJECTION, SOLUTION SUBCUTANEOUS WEEKLY
Qty: 2 ML | Refills: 1 | Status: SHIPPED | OUTPATIENT
Start: 2025-08-05 | End: 2025-09-30

## 2025-08-18 ENCOUNTER — OFFICE VISIT (OUTPATIENT)
Dept: BARIATRICS | Facility: CLINIC | Age: 65
End: 2025-08-18
Payer: COMMERCIAL

## 2025-08-18 VITALS
WEIGHT: 230.2 LBS | HEART RATE: 82 BPM | DIASTOLIC BLOOD PRESSURE: 72 MMHG | HEIGHT: 63 IN | OXYGEN SATURATION: 98 % | SYSTOLIC BLOOD PRESSURE: 126 MMHG | BODY MASS INDEX: 40.79 KG/M2

## 2025-08-18 DIAGNOSIS — E66.813 CLASS 3 SEVERE OBESITY DUE TO EXCESS CALORIES WITH SERIOUS COMORBIDITY AND BODY MASS INDEX (BMI) OF 40.0 TO 44.9 IN ADULT: Primary | ICD-10-CM

## 2025-08-18 DIAGNOSIS — E66.813 CLASS 3 SEVERE OBESITY DUE TO EXCESS CALORIES WITH SERIOUS COMORBIDITY AND BODY MASS INDEX (BMI) OF 45.0 TO 49.9 IN ADULT: ICD-10-CM

## 2025-08-18 PROCEDURE — 99215 OFFICE O/P EST HI 40 MIN: CPT | Performed by: INTERNAL MEDICINE

## 2025-08-18 RX ORDER — METFORMIN HYDROCHLORIDE 500 MG/1
TABLET, EXTENDED RELEASE ORAL
Qty: 180 TABLET | Refills: 3 | Status: SHIPPED | OUTPATIENT
Start: 2025-08-18

## 2025-08-18 RX ORDER — TIRZEPATIDE 10 MG/.5ML
10 INJECTION, SOLUTION SUBCUTANEOUS WEEKLY
Qty: 2 ML | Refills: 3 | Status: SHIPPED | OUTPATIENT
Start: 2025-08-18 | End: 2025-12-08

## 2025-08-20 ENCOUNTER — CONSULT (OUTPATIENT)
Dept: PAIN MEDICINE | Facility: CLINIC | Age: 65
End: 2025-08-20
Attending: FAMILY MEDICINE
Payer: COMMERCIAL

## 2025-08-20 VITALS — BODY MASS INDEX: 40.93 KG/M2 | WEIGHT: 231 LBS | HEIGHT: 63 IN

## 2025-08-20 DIAGNOSIS — M54.50 CHRONIC BILATERAL LOW BACK PAIN WITHOUT SCIATICA: ICD-10-CM

## 2025-08-20 DIAGNOSIS — Z98.1 STATUS POST LUMBAR SPINAL FUSION: Primary | ICD-10-CM

## 2025-08-20 DIAGNOSIS — G89.29 CHRONIC BILATERAL LOW BACK PAIN WITHOUT SCIATICA: ICD-10-CM

## 2025-08-20 PROCEDURE — 99244 OFF/OP CNSLTJ NEW/EST MOD 40: CPT | Performed by: PAIN MEDICINE

## (undated) DEVICE — GLOVE SRG BIOGEL 7

## (undated) DEVICE — STERILE BETHLEHEM PLASTIC HAND: Brand: CARDINAL HEALTH

## (undated) DEVICE — STRETCH BANDAGE: Brand: CURITY

## (undated) DEVICE — NEEDLE 25G X 1 1/2

## (undated) DEVICE — ACE WRAP 3 IN STERILE

## (undated) DEVICE — SKN PRP WNG SPNGE PVP SCRB STR: Brand: MEDLINE INDUSTRIES, INC.

## (undated) DEVICE — BLADE MINI RND TIP ONE SIDE SHARP

## (undated) DEVICE — HYDROPHILIC WOUND DRESSING WITH ZINC PLUS VITAMINS A AND B6.: Brand: DERMAGRAN®-B

## (undated) DEVICE — OCCLUSIVE GAUZE STRIP,3% BISMUTH TRIBROMOPHENATE IN PETROLATUM BLEND: Brand: XEROFORM

## (undated) DEVICE — DISPOSABLE EQUIPMENT COVER: Brand: SMALL TOWEL DRAPE

## (undated) DEVICE — INTENDED FOR TISSUE SEPARATION, AND OTHER PROCEDURES THAT REQUIRE A SHARP SURGICAL BLADE TO PUNCTURE OR CUT.: Brand: BARD-PARKER ® CARBON RIB-BACK BLADES

## (undated) DEVICE — GLOVE INDICATOR PI UNDERGLOVE SZ 7 BLUE

## (undated) DEVICE — CUFF TOURNIQUET 18 X 4 IN QUICK CONNECT DISP 1 BLADDER